# Patient Record
Sex: FEMALE | Race: WHITE | Employment: OTHER | URBAN - METROPOLITAN AREA
[De-identification: names, ages, dates, MRNs, and addresses within clinical notes are randomized per-mention and may not be internally consistent; named-entity substitution may affect disease eponyms.]

---

## 2017-01-10 RX ORDER — HYDROCHLOROTHIAZIDE 25 MG/1
TABLET ORAL
Qty: 90 TABLET | Refills: 3 | Status: SHIPPED | OUTPATIENT
Start: 2017-01-10 | End: 2017-04-25 | Stop reason: SDUPTHER

## 2017-04-11 ENCOUNTER — OFFICE VISIT (OUTPATIENT)
Dept: CARDIOLOGY CLINIC | Age: 82
End: 2017-04-11

## 2017-04-11 VITALS
OXYGEN SATURATION: 98 % | HEIGHT: 57 IN | BODY MASS INDEX: 32.36 KG/M2 | DIASTOLIC BLOOD PRESSURE: 60 MMHG | WEIGHT: 150 LBS | HEART RATE: 70 BPM | SYSTOLIC BLOOD PRESSURE: 104 MMHG

## 2017-04-11 VITALS
HEIGHT: 57 IN | HEART RATE: 72 BPM | BODY MASS INDEX: 32.36 KG/M2 | SYSTOLIC BLOOD PRESSURE: 124 MMHG | DIASTOLIC BLOOD PRESSURE: 62 MMHG | OXYGEN SATURATION: 96 % | WEIGHT: 150 LBS

## 2017-04-11 DIAGNOSIS — I10 ESSENTIAL HYPERTENSION: ICD-10-CM

## 2017-04-11 DIAGNOSIS — I48.19 PERSISTENT ATRIAL FIBRILLATION (HCC): Primary | ICD-10-CM

## 2017-04-11 DIAGNOSIS — R29.6 RECURRENT FALLS: ICD-10-CM

## 2017-04-11 DIAGNOSIS — R29.6 FALLS FREQUENTLY: ICD-10-CM

## 2017-04-11 DIAGNOSIS — E78.2 MIXED HYPERLIPIDEMIA: ICD-10-CM

## 2017-04-11 PROCEDURE — G8417 CALC BMI ABV UP PARAM F/U: HCPCS | Performed by: INTERNAL MEDICINE

## 2017-04-11 PROCEDURE — 1090F PRES/ABSN URINE INCON ASSESS: CPT | Performed by: INTERNAL MEDICINE

## 2017-04-11 PROCEDURE — G8427 DOCREV CUR MEDS BY ELIG CLIN: HCPCS | Performed by: INTERNAL MEDICINE

## 2017-04-11 PROCEDURE — 1036F TOBACCO NON-USER: CPT | Performed by: INTERNAL MEDICINE

## 2017-04-11 PROCEDURE — 1123F ACP DISCUSS/DSCN MKR DOCD: CPT | Performed by: INTERNAL MEDICINE

## 2017-04-11 PROCEDURE — 4040F PNEUMOC VAC/ADMIN/RCVD: CPT | Performed by: INTERNAL MEDICINE

## 2017-04-11 PROCEDURE — 93000 ELECTROCARDIOGRAM COMPLETE: CPT | Performed by: INTERNAL MEDICINE

## 2017-04-11 PROCEDURE — 99214 OFFICE O/P EST MOD 30 MIN: CPT | Performed by: INTERNAL MEDICINE

## 2017-04-11 PROCEDURE — 99999 PR OFFICE/OUTPT VISIT,PROCEDURE ONLY: CPT | Performed by: INTERNAL MEDICINE

## 2017-04-13 ENCOUNTER — TELEPHONE (OUTPATIENT)
Dept: CARDIOLOGY CLINIC | Age: 82
End: 2017-04-13

## 2017-04-17 ENCOUNTER — HOSPITAL ENCOUNTER (OUTPATIENT)
Dept: CARDIAC CATH/INVASIVE PROCEDURES | Age: 82
Discharge: HOME | End: 2017-04-17
Attending: INTERNAL MEDICINE | Admitting: INTERNAL MEDICINE

## 2017-04-17 VITALS — BODY MASS INDEX: 33.97 KG/M2 | WEIGHT: 151 LBS | HEIGHT: 56 IN

## 2017-04-17 LAB
LV EF: 55 %
LVEF MODALITY: NORMAL

## 2017-04-17 RX ORDER — SODIUM CHLORIDE 0.9 % (FLUSH) 0.9 %
10 SYRINGE (ML) INJECTION EVERY 12 HOURS SCHEDULED
Status: DISCONTINUED | OUTPATIENT
Start: 2017-04-17 | End: 2017-04-18 | Stop reason: HOSPADM

## 2017-04-17 RX ORDER — LORAZEPAM 1 MG/1
0.5 TABLET ORAL ONCE
Status: DISCONTINUED | OUTPATIENT
Start: 2017-04-17 | End: 2017-04-18 | Stop reason: HOSPADM

## 2017-04-17 RX ORDER — SODIUM CHLORIDE 0.9 % (FLUSH) 0.9 %
10 SYRINGE (ML) INJECTION PRN
Status: DISCONTINUED | OUTPATIENT
Start: 2017-04-17 | End: 2017-04-18 | Stop reason: HOSPADM

## 2017-04-17 RX ORDER — SODIUM CHLORIDE 0.9 % (FLUSH) 0.9 %
10 SYRINGE (ML) INJECTION PRN
Status: CANCELLED | OUTPATIENT
Start: 2017-04-17

## 2017-04-17 RX ORDER — SODIUM CHLORIDE 9 MG/ML
INJECTION, SOLUTION INTRAVENOUS CONTINUOUS
Status: DISCONTINUED | OUTPATIENT
Start: 2017-04-17 | End: 2017-04-18 | Stop reason: HOSPADM

## 2017-04-17 RX ORDER — ONDANSETRON 2 MG/ML
4 INJECTION INTRAMUSCULAR; INTRAVENOUS EVERY 6 HOURS PRN
Status: CANCELLED | OUTPATIENT
Start: 2017-04-17

## 2017-04-17 RX ORDER — ACETAMINOPHEN 325 MG/1
650 TABLET ORAL EVERY 4 HOURS PRN
Status: CANCELLED | OUTPATIENT
Start: 2017-04-17

## 2017-04-17 RX ORDER — SODIUM CHLORIDE 0.9 % (FLUSH) 0.9 %
10 SYRINGE (ML) INJECTION EVERY 12 HOURS SCHEDULED
Status: CANCELLED | OUTPATIENT
Start: 2017-04-17

## 2017-04-24 DIAGNOSIS — I48.19 PERSISTENT ATRIAL FIBRILLATION (HCC): ICD-10-CM

## 2017-04-24 LAB
ANION GAP SERPL CALCULATED.3IONS-SCNC: 15 MMOL/L (ref 3–16)
BUN BLDV-MCNC: 43 MG/DL (ref 7–20)
CALCIUM SERPL-MCNC: 9.7 MG/DL (ref 8.3–10.6)
CHLORIDE BLD-SCNC: 108 MMOL/L (ref 99–110)
CO2: 24 MMOL/L (ref 21–32)
CREAT SERPL-MCNC: 1.3 MG/DL (ref 0.6–1.2)
GFR AFRICAN AMERICAN: 47
GFR NON-AFRICAN AMERICAN: 39
GLUCOSE BLD-MCNC: 96 MG/DL (ref 70–99)
POTASSIUM SERPL-SCNC: 5.4 MMOL/L (ref 3.5–5.1)
SODIUM BLD-SCNC: 147 MMOL/L (ref 136–145)

## 2017-04-25 ENCOUNTER — OFFICE VISIT (OUTPATIENT)
Dept: CARDIOLOGY CLINIC | Age: 82
End: 2017-04-25

## 2017-04-25 VITALS
OXYGEN SATURATION: 96 % | HEIGHT: 56 IN | DIASTOLIC BLOOD PRESSURE: 64 MMHG | BODY MASS INDEX: 33.74 KG/M2 | HEART RATE: 71 BPM | WEIGHT: 150 LBS | SYSTOLIC BLOOD PRESSURE: 118 MMHG

## 2017-04-25 DIAGNOSIS — E78.2 MIXED HYPERLIPIDEMIA: ICD-10-CM

## 2017-04-25 DIAGNOSIS — I10 ESSENTIAL HYPERTENSION: ICD-10-CM

## 2017-04-25 DIAGNOSIS — I48.19 PERSISTENT ATRIAL FIBRILLATION (HCC): Primary | ICD-10-CM

## 2017-04-25 PROCEDURE — 1036F TOBACCO NON-USER: CPT | Performed by: INTERNAL MEDICINE

## 2017-04-25 PROCEDURE — G8417 CALC BMI ABV UP PARAM F/U: HCPCS | Performed by: INTERNAL MEDICINE

## 2017-04-25 PROCEDURE — 1090F PRES/ABSN URINE INCON ASSESS: CPT | Performed by: INTERNAL MEDICINE

## 2017-04-25 PROCEDURE — 93000 ELECTROCARDIOGRAM COMPLETE: CPT | Performed by: INTERNAL MEDICINE

## 2017-04-25 PROCEDURE — 4040F PNEUMOC VAC/ADMIN/RCVD: CPT | Performed by: INTERNAL MEDICINE

## 2017-04-25 PROCEDURE — 99215 OFFICE O/P EST HI 40 MIN: CPT | Performed by: INTERNAL MEDICINE

## 2017-04-25 PROCEDURE — 1123F ACP DISCUSS/DSCN MKR DOCD: CPT | Performed by: INTERNAL MEDICINE

## 2017-04-25 PROCEDURE — G8427 DOCREV CUR MEDS BY ELIG CLIN: HCPCS | Performed by: INTERNAL MEDICINE

## 2017-07-13 ENCOUNTER — OFFICE VISIT (OUTPATIENT)
Dept: CARDIOLOGY CLINIC | Age: 82
End: 2017-07-13

## 2017-07-13 VITALS
HEART RATE: 78 BPM | WEIGHT: 148 LBS | HEIGHT: 56 IN | DIASTOLIC BLOOD PRESSURE: 84 MMHG | OXYGEN SATURATION: 99 % | BODY MASS INDEX: 33.29 KG/M2 | SYSTOLIC BLOOD PRESSURE: 126 MMHG

## 2017-07-13 DIAGNOSIS — E78.2 MIXED HYPERLIPIDEMIA: ICD-10-CM

## 2017-07-13 DIAGNOSIS — I48.19 PERSISTENT ATRIAL FIBRILLATION (HCC): Primary | ICD-10-CM

## 2017-07-13 DIAGNOSIS — I10 ESSENTIAL HYPERTENSION: ICD-10-CM

## 2017-07-13 PROCEDURE — 99214 OFFICE O/P EST MOD 30 MIN: CPT | Performed by: INTERNAL MEDICINE

## 2017-07-13 PROCEDURE — 4040F PNEUMOC VAC/ADMIN/RCVD: CPT | Performed by: INTERNAL MEDICINE

## 2017-07-13 PROCEDURE — G8427 DOCREV CUR MEDS BY ELIG CLIN: HCPCS | Performed by: INTERNAL MEDICINE

## 2017-07-13 PROCEDURE — 1090F PRES/ABSN URINE INCON ASSESS: CPT | Performed by: INTERNAL MEDICINE

## 2017-07-13 PROCEDURE — 93000 ELECTROCARDIOGRAM COMPLETE: CPT | Performed by: INTERNAL MEDICINE

## 2017-07-13 PROCEDURE — 1123F ACP DISCUSS/DSCN MKR DOCD: CPT | Performed by: INTERNAL MEDICINE

## 2017-07-13 PROCEDURE — G8417 CALC BMI ABV UP PARAM F/U: HCPCS | Performed by: INTERNAL MEDICINE

## 2017-07-13 PROCEDURE — 1036F TOBACCO NON-USER: CPT | Performed by: INTERNAL MEDICINE

## 2017-07-19 LAB — ABO/RH: NORMAL

## 2017-07-25 ENCOUNTER — OFFICE VISIT (OUTPATIENT)
Dept: CARDIOLOGY CLINIC | Age: 82
End: 2017-07-25

## 2017-07-25 ENCOUNTER — TELEPHONE (OUTPATIENT)
Dept: CARDIOLOGY CLINIC | Age: 82
End: 2017-07-25

## 2017-07-25 VITALS
DIASTOLIC BLOOD PRESSURE: 60 MMHG | HEIGHT: 57 IN | WEIGHT: 149 LBS | BODY MASS INDEX: 32.15 KG/M2 | HEART RATE: 76 BPM | OXYGEN SATURATION: 95 % | SYSTOLIC BLOOD PRESSURE: 108 MMHG

## 2017-07-25 DIAGNOSIS — E78.2 MIXED HYPERLIPIDEMIA: ICD-10-CM

## 2017-07-25 DIAGNOSIS — I48.91 ATRIAL FIBRILLATION, UNSPECIFIED TYPE (HCC): Primary | ICD-10-CM

## 2017-07-25 DIAGNOSIS — I10 ESSENTIAL HYPERTENSION: ICD-10-CM

## 2017-07-25 LAB
CHOLESTEROL, TOTAL: 125 MG/DL (ref 0–199)
HDLC SERPL-MCNC: 68 MG/DL (ref 40–60)
LDL CHOLESTEROL CALCULATED: 40 MG/DL
TRIGL SERPL-MCNC: 83 MG/DL (ref 0–150)
VLDLC SERPL CALC-MCNC: 17 MG/DL

## 2017-07-25 PROCEDURE — 99214 OFFICE O/P EST MOD 30 MIN: CPT | Performed by: INTERNAL MEDICINE

## 2017-07-25 PROCEDURE — 1036F TOBACCO NON-USER: CPT | Performed by: INTERNAL MEDICINE

## 2017-07-25 PROCEDURE — 1123F ACP DISCUSS/DSCN MKR DOCD: CPT | Performed by: INTERNAL MEDICINE

## 2017-07-25 PROCEDURE — 1090F PRES/ABSN URINE INCON ASSESS: CPT | Performed by: INTERNAL MEDICINE

## 2017-07-25 PROCEDURE — G8417 CALC BMI ABV UP PARAM F/U: HCPCS | Performed by: INTERNAL MEDICINE

## 2017-07-25 PROCEDURE — G8427 DOCREV CUR MEDS BY ELIG CLIN: HCPCS | Performed by: INTERNAL MEDICINE

## 2017-07-25 PROCEDURE — 1111F DSCHRG MED/CURRENT MED MERGE: CPT | Performed by: INTERNAL MEDICINE

## 2017-07-25 PROCEDURE — 4040F PNEUMOC VAC/ADMIN/RCVD: CPT | Performed by: INTERNAL MEDICINE

## 2017-07-25 PROCEDURE — 93000 ELECTROCARDIOGRAM COMPLETE: CPT | Performed by: INTERNAL MEDICINE

## 2017-08-07 DIAGNOSIS — I48.19 PERSISTENT ATRIAL FIBRILLATION (HCC): Primary | ICD-10-CM

## 2017-08-11 ENCOUNTER — TELEPHONE (OUTPATIENT)
Dept: CARDIOLOGY CLINIC | Age: 82
End: 2017-08-11

## 2017-08-17 ENCOUNTER — OFFICE VISIT (OUTPATIENT)
Dept: CARDIOLOGY CLINIC | Age: 82
End: 2017-08-17

## 2017-08-17 VITALS
WEIGHT: 146 LBS | OXYGEN SATURATION: 98 % | DIASTOLIC BLOOD PRESSURE: 84 MMHG | HEIGHT: 57 IN | SYSTOLIC BLOOD PRESSURE: 138 MMHG | HEART RATE: 67 BPM | BODY MASS INDEX: 31.5 KG/M2

## 2017-08-17 DIAGNOSIS — I48.91 ATRIAL FIBRILLATION, UNSPECIFIED TYPE (HCC): Primary | ICD-10-CM

## 2017-08-17 DIAGNOSIS — I10 ESSENTIAL HYPERTENSION: ICD-10-CM

## 2017-08-17 PROCEDURE — 1036F TOBACCO NON-USER: CPT | Performed by: INTERNAL MEDICINE

## 2017-08-17 PROCEDURE — 1090F PRES/ABSN URINE INCON ASSESS: CPT | Performed by: INTERNAL MEDICINE

## 2017-08-17 PROCEDURE — G8427 DOCREV CUR MEDS BY ELIG CLIN: HCPCS | Performed by: INTERNAL MEDICINE

## 2017-08-17 PROCEDURE — 99215 OFFICE O/P EST HI 40 MIN: CPT | Performed by: INTERNAL MEDICINE

## 2017-08-17 PROCEDURE — 1123F ACP DISCUSS/DSCN MKR DOCD: CPT | Performed by: INTERNAL MEDICINE

## 2017-08-17 PROCEDURE — 93000 ELECTROCARDIOGRAM COMPLETE: CPT | Performed by: INTERNAL MEDICINE

## 2017-08-17 PROCEDURE — G8417 CALC BMI ABV UP PARAM F/U: HCPCS | Performed by: INTERNAL MEDICINE

## 2017-08-17 PROCEDURE — 1111F DSCHRG MED/CURRENT MED MERGE: CPT | Performed by: INTERNAL MEDICINE

## 2017-08-17 PROCEDURE — 4040F PNEUMOC VAC/ADMIN/RCVD: CPT | Performed by: INTERNAL MEDICINE

## 2017-09-01 RX ORDER — SODIUM CHLORIDE 0.9 % (FLUSH) 0.9 %
10 SYRINGE (ML) INJECTION EVERY 12 HOURS SCHEDULED
Status: CANCELLED | OUTPATIENT
Start: 2017-09-05

## 2017-09-01 RX ORDER — SODIUM CHLORIDE 0.9 % (FLUSH) 0.9 %
10 SYRINGE (ML) INJECTION PRN
Status: CANCELLED | OUTPATIENT
Start: 2017-09-05

## 2017-09-01 RX ORDER — SODIUM CHLORIDE 9 MG/ML
INJECTION, SOLUTION INTRAVENOUS CONTINUOUS
Status: CANCELLED | OUTPATIENT
Start: 2017-09-05

## 2017-09-05 ENCOUNTER — HOSPITAL ENCOUNTER (OUTPATIENT)
Dept: CARDIAC CATH/INVASIVE PROCEDURES | Age: 82
Discharge: HOME | End: 2017-09-05
Admitting: INTERNAL MEDICINE

## 2017-09-06 ENCOUNTER — TELEPHONE (OUTPATIENT)
Dept: CARDIOLOGY CLINIC | Age: 82
End: 2017-09-06

## 2017-09-08 ENCOUNTER — HOSPITAL ENCOUNTER (OUTPATIENT)
Dept: CARDIAC CATH/INVASIVE PROCEDURES | Age: 82
Discharge: HOME | End: 2017-09-08
Admitting: INTERNAL MEDICINE

## 2017-09-08 LAB
LV EF: 55 %
LVEF MODALITY: NORMAL

## 2017-09-08 RX ORDER — SODIUM CHLORIDE 0.9 % (FLUSH) 0.9 %
10 SYRINGE (ML) INJECTION PRN
Status: DISCONTINUED | OUTPATIENT
Start: 2017-09-08 | End: 2017-09-09 | Stop reason: HOSPADM

## 2017-09-08 RX ORDER — SODIUM CHLORIDE 0.9 % (FLUSH) 0.9 %
10 SYRINGE (ML) INJECTION EVERY 12 HOURS SCHEDULED
Status: DISCONTINUED | OUTPATIENT
Start: 2017-09-08 | End: 2017-09-09 | Stop reason: HOSPADM

## 2017-09-08 RX ORDER — SODIUM CHLORIDE 9 MG/ML
INJECTION, SOLUTION INTRAVENOUS CONTINUOUS
Status: DISCONTINUED | OUTPATIENT
Start: 2017-09-08 | End: 2017-09-09 | Stop reason: HOSPADM

## 2017-09-20 ENCOUNTER — TELEPHONE (OUTPATIENT)
Dept: CARDIOLOGY CLINIC | Age: 82
End: 2017-09-20

## 2017-10-09 ENCOUNTER — TELEPHONE (OUTPATIENT)
Dept: CARDIOLOGY CLINIC | Age: 82
End: 2017-10-09

## 2017-10-09 NOTE — TELEPHONE ENCOUNTER
Lacho Okeefe called in stating that she has become more SOB recently since shes had the watchman.     Lacho Okeefe can be reached at [phone]

## 2017-10-10 NOTE — TELEPHONE ENCOUNTER
Spoke with Mai Rizo to see if she is avail tomorrow to come in to see Ronal Levy CNP to make sure that she is not in heart failure. She dos note that her allergies are really bad right now and she does take an allergy pill daily.  She is able to come to the appt tomorrow's opening at 2pm.

## 2017-10-11 ENCOUNTER — OFFICE VISIT (OUTPATIENT)
Dept: CARDIOLOGY CLINIC | Age: 82
End: 2017-10-11

## 2017-10-11 VITALS
HEART RATE: 60 BPM | OXYGEN SATURATION: 98 % | WEIGHT: 142 LBS | DIASTOLIC BLOOD PRESSURE: 70 MMHG | HEIGHT: 57 IN | BODY MASS INDEX: 30.63 KG/M2 | SYSTOLIC BLOOD PRESSURE: 134 MMHG

## 2017-10-11 DIAGNOSIS — I48.19 PERSISTENT ATRIAL FIBRILLATION (HCC): ICD-10-CM

## 2017-10-11 DIAGNOSIS — R06.02 SOB (SHORTNESS OF BREATH): Primary | ICD-10-CM

## 2017-10-11 DIAGNOSIS — E78.5 HYPERLIPIDEMIA, UNSPECIFIED HYPERLIPIDEMIA TYPE: ICD-10-CM

## 2017-10-11 DIAGNOSIS — I10 ESSENTIAL HYPERTENSION: ICD-10-CM

## 2017-10-11 DIAGNOSIS — D64.9 ANEMIA, UNSPECIFIED TYPE: ICD-10-CM

## 2017-10-11 PROCEDURE — 1090F PRES/ABSN URINE INCON ASSESS: CPT | Performed by: NURSE PRACTITIONER

## 2017-10-11 PROCEDURE — 1123F ACP DISCUSS/DSCN MKR DOCD: CPT | Performed by: NURSE PRACTITIONER

## 2017-10-11 PROCEDURE — 1036F TOBACCO NON-USER: CPT | Performed by: NURSE PRACTITIONER

## 2017-10-11 PROCEDURE — G8427 DOCREV CUR MEDS BY ELIG CLIN: HCPCS | Performed by: NURSE PRACTITIONER

## 2017-10-11 PROCEDURE — 4040F PNEUMOC VAC/ADMIN/RCVD: CPT | Performed by: NURSE PRACTITIONER

## 2017-10-11 PROCEDURE — G8417 CALC BMI ABV UP PARAM F/U: HCPCS | Performed by: NURSE PRACTITIONER

## 2017-10-11 PROCEDURE — G8484 FLU IMMUNIZE NO ADMIN: HCPCS | Performed by: NURSE PRACTITIONER

## 2017-10-11 PROCEDURE — 99214 OFFICE O/P EST MOD 30 MIN: CPT | Performed by: NURSE PRACTITIONER

## 2017-10-11 RX ORDER — CLOPIDOGREL BISULFATE 75 MG/1
75 TABLET ORAL DAILY
Qty: 30 TABLET | Refills: 5 | Status: SHIPPED | OUTPATIENT
Start: 2017-10-11 | End: 2017-10-11 | Stop reason: SDUPTHER

## 2017-10-11 NOTE — PROGRESS NOTES
142 lb (64.4 kg)   SpO2 98%   BMI 30.73 kg/m²   Wt Readings from Last 2 Encounters:   10/11/17 142 lb (64.4 kg)   08/17/17 146 lb (66.2 kg)     Constitutional: She is oriented to person, place, and time. She is elderly and frail in appeaarance. In no acute distress. HEENT: Normocephalic and atraumatic. Sclerae anicteric. No xanthelasmas. EOM's intact. Neck: Neck supple. No JVD present. Carotids without bruits. No mass and no thyromegaly present. No lymphadenopathy present. Cardiovascular: irreg;  normal S1 and S2; no murmur/gallop or rub, PMI nondisplaced  Pulmonary/Chest: Effort normal and breath sounds normal.  Lungs clear to auscultation. Chest wall nontender  Abdominal: soft, nontender, nondistended. + bowel sounds; no organomegaly or bruits. Aorta normal  Extremities: No edema, cyanosis, or clubbing. Pulses are 2+ radial/carotid/dorsalis pedis bilaterally. Cap refill brisk. Neurological: No cranial nerve deficit. Skin: Skin is warm and dry. Psychiatric: She has a normal mood and affect. Her speech is normal and behavior is normal.     Lab Review:   Lab Results   Component Value Date    TRIG 83 07/25/2017    HDL 68 07/25/2017    LDLCALC 40 07/25/2017    LABVLDL 17 07/25/2017     Lab Results   Component Value Date     07/20/2017    K 4.7 07/20/2017     07/20/2017    CO2 23 07/20/2017    BUN 30 07/20/2017    CREATININE 1.1 07/20/2017    GLUCOSE 102 07/20/2017    CALCIUM 9.1 07/20/2017      Lab Results   Component Value Date    WBC 8.6 07/20/2017    HGB 9.8 (L) 07/20/2017    HCT 30.4 (L) 07/20/2017    MCV 98.9 07/20/2017     07/20/2017     MARTHA 9/8/2017:  LA appendage occlusion device WATCHMAN in place and position appears good  and its well seated  Color flow does not reveal any residual leak. Normal LV function. LAAO procedure 7/19-7/20 at [facility] 30mm Watchman Device Implanted    MARTHA 4/17/2017:  The patient tolerated the procedure well.  There were no complications.   The MARTHA assessment of MARGE did not reveal any thrombus the measurements are recorded in the file   The largest MARGE Ostial diameter recorded was 2.9 cm with a depth of 2.75 cm   Normal left ventricle size, wall thickness and systolic function with an  estimated ejection fraction of 55%. No regional wall motion abnormalities  are seen.  Normal right ventricular size and function. 3/2007 Adenosine Myoview: ([facility])  Negative for ischemia     Assessment:    1. SOB (shortness of breath)  -with exertion and associated with increased back pain  -suspect precipitated by back pain  -no sxs of CHF on exam  -euvolemic    2. Persistent atrial fibrillation (HCC)  -S/P prior MARTHA and DCCV with return to A-fib  -S/P LAAO, Watchman procedure in 7/2017  -on Xarelto and ASA: as per Dr. Jayro Jackson prior note, plan was to change Xarelto to Plavix after MARTHA along with continuing ASA  -CHADSVasc score is at least 4  -HasBled score 3    3. Essential hypertension  -controlled  -continue ACE-I + HCTZ    4. Hyperlipidemia, unspecified hyperlipidemia type  -on statin  -LDL acceptable on labs from 7/2017    5. Anemia, unspecified type  -follow up CBC to R/O worsening anemia    Plan:  Check CBC to assess status of anemia and Pro-BNP  Stop Xarelto after current Rx completed and then add Plavix 75 mg daily and continue with ASA daily  Continue HCTZ, Lisinopril, fish oil, pravastatin  Discussed low fat/low sodium diet and reinforced regular aerobic exercise. Follow up in 6-8 weeks with Dr. Kole Charles    Return in about 6 weeks (around 11/22/2017) for 6-8 weeks with Dr. Kole Charles. Thanks for allowing me to participate in the care of this patient.       Nelida Pierson, [address], [address], [address] [facility]  Office: [phone]  Fax: [phone]

## 2017-10-11 NOTE — LETTER
5. Anemia, unspecified type  -follow up CBC to R/O worsening anemia    Plan:    Check CBC to assess status of anemia and Pro-BNP  Stop Xarelto after current Rx completed and then add Plavix 75 mg daily and continue with ASA daily  Continue HCTZ, Lisinopril, fish oil, pravastatin  Discussed low fat/low sodium diet and reinforced regular aerobic exercise. Follow up in 6-8 weeks with Dr. Camryn Barnes    Return in about 6 weeks (around 11/22/2017) for 6-8 weeks with Dr. Camryn Barnes. Thanks for allowing me to participate in the care of this patient. If you have questions, please do not hesitate to call me. I look forward to following Marilynn along with you.     Sincerely,        NURIS Contreras, CNP

## 2017-10-12 RX ORDER — CLOPIDOGREL BISULFATE 75 MG/1
75 TABLET ORAL DAILY
Qty: 90 TABLET | Refills: 3 | Status: SHIPPED | OUTPATIENT
Start: 2017-10-12 | End: 2017-10-16 | Stop reason: SDUPTHER

## 2017-10-12 NOTE — COMMUNICATION BODY
HPI:  The patient is 80 y.o. female with a past medical history significant for  Persistent atrial fib and S/P LAAO with Watchman device in 7/2017, HTN, dyslipidemia and chronic back pain here for follow up d/t c/o SOB with exertion. Follows normally with Dr. Favio Buckner and underwent MARTHA on 9/8/2017 which showed Watchman device is in place and residual leak noted with normal LV function. Has noted increase in her allergy issues recently and having increasing SOBOE with inclines and walking prolonged distances associated with back pain and hyperventilation. Her back pain continues to worsen (following pain management). No regular exercise. Denies weight gain, edema, chest pain/discomfort, orthopnea/PND, dizziness, syncope or claudication. Has noted nasal drainage and occasional nonproductive cough. Assessment:    1. SOB (shortness of breath)  -with exertion and associated with increased back pain  -suspect precipitated by back pain  -no sxs of CHF on exam  -euvolemic    2. Persistent atrial fibrillation (HCC)  -S/P prior MARTHA and DCCV with return to A-fib  -S/P LAAO, Watchman procedure in 7/2017  -on Xarelto and ASA: as per Dr. Santiago Ashraf prior note, plan was to change Xarelto to Plavix after MARTHA along with continuing ASA  -CHADSVasc score is at least 4  -HasBled score 3    3. Essential hypertension  -controlled  -continue ACE-I + HCTZ    4. Hyperlipidemia, unspecified hyperlipidemia type  -on statin  -LDL acceptable on labs from 7/2017    5. Anemia, unspecified type  -follow up CBC to R/O worsening anemia    Plan:    Check CBC to assess status of anemia and Pro-BNP  Stop Xarelto after current Rx completed and then add Plavix 75 mg daily and continue with ASA daily  Continue HCTZ, Lisinopril, fish oil, pravastatin  Discussed low fat/low sodium diet and reinforced regular aerobic exercise. Follow up in 6-8 weeks with Dr. Adin Quintero    Return in about 6 weeks (around 11/22/2017) for 6-8 weeks with Dr. Adin Quintero.

## 2017-10-13 DIAGNOSIS — R06.02 SOB (SHORTNESS OF BREATH): ICD-10-CM

## 2017-10-13 DIAGNOSIS — I48.19 PERSISTENT ATRIAL FIBRILLATION (HCC): ICD-10-CM

## 2017-10-13 LAB
ANION GAP SERPL CALCULATED.3IONS-SCNC: 13 MMOL/L (ref 3–16)
BASOPHILS ABSOLUTE: 0 K/UL (ref 0–0.2)
BASOPHILS RELATIVE PERCENT: 0.4 %
BUN BLDV-MCNC: 31 MG/DL (ref 7–20)
CALCIUM SERPL-MCNC: 10 MG/DL (ref 8.3–10.6)
CHLORIDE BLD-SCNC: 103 MMOL/L (ref 99–110)
CO2: 25 MMOL/L (ref 21–32)
CREAT SERPL-MCNC: 1.2 MG/DL (ref 0.6–1.2)
EOSINOPHILS ABSOLUTE: 0.1 K/UL (ref 0–0.6)
EOSINOPHILS RELATIVE PERCENT: 1 %
GFR AFRICAN AMERICAN: 51
GFR NON-AFRICAN AMERICAN: 42
GLUCOSE BLD-MCNC: 96 MG/DL (ref 70–99)
HCT VFR BLD CALC: 32.5 % (ref 36–48)
HEMOGLOBIN: 10.5 G/DL (ref 12–16)
LYMPHOCYTES ABSOLUTE: 1.6 K/UL (ref 1–5.1)
LYMPHOCYTES RELATIVE PERCENT: 22.4 %
MCH RBC QN AUTO: 31.2 PG (ref 26–34)
MCHC RBC AUTO-ENTMCNC: 32.3 G/DL (ref 31–36)
MCV RBC AUTO: 96.6 FL (ref 80–100)
MONOCYTES ABSOLUTE: 0.5 K/UL (ref 0–1.3)
MONOCYTES RELATIVE PERCENT: 7.4 %
NEUTROPHILS ABSOLUTE: 4.9 K/UL (ref 1.7–7.7)
NEUTROPHILS RELATIVE PERCENT: 68.8 %
PDW BLD-RTO: 14 % (ref 12.4–15.4)
PLATELET # BLD: 215 K/UL (ref 135–450)
PMV BLD AUTO: 9.2 FL (ref 5–10.5)
POTASSIUM SERPL-SCNC: 5.5 MMOL/L (ref 3.5–5.1)
PRO-BNP: 2307 PG/ML (ref 0–449)
RBC # BLD: 3.37 M/UL (ref 4–5.2)
SODIUM BLD-SCNC: 141 MMOL/L (ref 136–145)
WBC # BLD: 7.2 K/UL (ref 4–11)

## 2017-10-16 ENCOUNTER — TELEPHONE (OUTPATIENT)
Dept: CARDIOLOGY CLINIC | Age: 82
End: 2017-10-16

## 2017-10-16 DIAGNOSIS — I48.19 PERSISTENT ATRIAL FIBRILLATION (HCC): ICD-10-CM

## 2017-10-16 DIAGNOSIS — E87.5 HYPERKALEMIA: Primary | ICD-10-CM

## 2017-10-16 RX ORDER — ASPIRIN 81 MG/1
81 TABLET, CHEWABLE ORAL DAILY
Qty: 30 TABLET | Refills: 6 | Status: SHIPPED | OUTPATIENT
Start: 2017-10-16 | End: 2018-05-22 | Stop reason: SDUPTHER

## 2017-10-16 RX ORDER — CLOPIDOGREL BISULFATE 75 MG/1
75 TABLET ORAL DAILY
Qty: 90 TABLET | Refills: 3 | Status: SHIPPED | OUTPATIENT
Start: 2017-10-16 | End: 2018-05-29 | Stop reason: ALTCHOICE

## 2017-10-31 ENCOUNTER — TELEPHONE (OUTPATIENT)
Dept: CARDIOLOGY CLINIC | Age: 82
End: 2017-10-31

## 2017-10-31 DIAGNOSIS — E87.5 HYPERKALEMIA: ICD-10-CM

## 2017-10-31 LAB
ANION GAP SERPL CALCULATED.3IONS-SCNC: 15 MMOL/L (ref 3–16)
BUN BLDV-MCNC: 35 MG/DL (ref 7–20)
CALCIUM SERPL-MCNC: 10.2 MG/DL (ref 8.3–10.6)
CHLORIDE BLD-SCNC: 107 MMOL/L (ref 99–110)
CO2: 22 MMOL/L (ref 21–32)
CREAT SERPL-MCNC: 1.1 MG/DL (ref 0.6–1.2)
GFR AFRICAN AMERICAN: 57
GFR NON-AFRICAN AMERICAN: 47
GLUCOSE BLD-MCNC: 84 MG/DL (ref 70–99)
POTASSIUM SERPL-SCNC: 5.2 MMOL/L (ref 3.5–5.1)
SODIUM BLD-SCNC: 144 MMOL/L (ref 136–145)

## 2017-11-02 ENCOUNTER — TELEPHONE (OUTPATIENT)
Dept: CARDIOLOGY CLINIC | Age: 82
End: 2017-11-02

## 2017-11-02 DIAGNOSIS — E87.5 HIGH POTASSIUM: Primary | ICD-10-CM

## 2017-11-02 RX ORDER — TRAZODONE HYDROCHLORIDE 50 MG/1
50 TABLET ORAL NIGHTLY
Qty: 30 TABLET | Refills: 0
Start: 2017-11-02

## 2017-11-02 NOTE — TELEPHONE ENCOUNTER
Holly Patel called in stating that she takes Trazodone 50 mg once at night to help her sleep and she doesn't think that is on her medication list.

## 2017-11-27 ENCOUNTER — OFFICE VISIT (OUTPATIENT)
Dept: CARDIOLOGY CLINIC | Age: 82
End: 2017-11-27

## 2017-11-27 VITALS
SYSTOLIC BLOOD PRESSURE: 134 MMHG | WEIGHT: 143.38 LBS | DIASTOLIC BLOOD PRESSURE: 78 MMHG | BODY MASS INDEX: 30.93 KG/M2 | HEIGHT: 57 IN | HEART RATE: 70 BPM | OXYGEN SATURATION: 97 %

## 2017-11-27 DIAGNOSIS — E87.5 HYPERKALEMIA: ICD-10-CM

## 2017-11-27 DIAGNOSIS — I48.91 ATRIAL FIBRILLATION, UNSPECIFIED TYPE (HCC): Primary | ICD-10-CM

## 2017-11-27 DIAGNOSIS — E78.5 HYPERLIPIDEMIA, UNSPECIFIED HYPERLIPIDEMIA TYPE: ICD-10-CM

## 2017-11-27 DIAGNOSIS — I10 ESSENTIAL HYPERTENSION: ICD-10-CM

## 2017-11-27 DIAGNOSIS — D64.9 ANEMIA, UNSPECIFIED TYPE: ICD-10-CM

## 2017-11-27 PROCEDURE — G8417 CALC BMI ABV UP PARAM F/U: HCPCS | Performed by: INTERNAL MEDICINE

## 2017-11-27 PROCEDURE — G8484 FLU IMMUNIZE NO ADMIN: HCPCS | Performed by: INTERNAL MEDICINE

## 2017-11-27 PROCEDURE — 4040F PNEUMOC VAC/ADMIN/RCVD: CPT | Performed by: INTERNAL MEDICINE

## 2017-11-27 PROCEDURE — 93000 ELECTROCARDIOGRAM COMPLETE: CPT | Performed by: INTERNAL MEDICINE

## 2017-11-27 PROCEDURE — G8427 DOCREV CUR MEDS BY ELIG CLIN: HCPCS | Performed by: INTERNAL MEDICINE

## 2017-11-27 PROCEDURE — 1123F ACP DISCUSS/DSCN MKR DOCD: CPT | Performed by: INTERNAL MEDICINE

## 2017-11-27 PROCEDURE — 1090F PRES/ABSN URINE INCON ASSESS: CPT | Performed by: INTERNAL MEDICINE

## 2017-11-27 PROCEDURE — 99214 OFFICE O/P EST MOD 30 MIN: CPT | Performed by: INTERNAL MEDICINE

## 2017-11-27 PROCEDURE — 1036F TOBACCO NON-USER: CPT | Performed by: INTERNAL MEDICINE

## 2017-11-27 RX ORDER — TIZANIDINE 2 MG/1
TABLET ORAL
Refills: 0 | Status: ON HOLD | COMMUNITY
Start: 2017-11-16 | End: 2019-02-26 | Stop reason: SDUPTHER

## 2017-11-27 NOTE — PROGRESS NOTES
[address]  Cardiology Office Visit    Kenzie Brown  5/6/1930 November 27, 2017    CC: CHF, AFIB    The patient is 80 y.o. female with a past medical history significant for  Persistent atrial fib and S/P LAAO with Watchman device in 7/2017, HTN, dyslipidemia and chronic back pain here for follow up d/t c/o SOB with exertion. Follows normally with Dr. Lane Perea and underwent MARTHA on 9/8/2017 which showed Watchman device is in place and residual leak noted with normal LV function. Since we last saw Holly Patel she reports that she has been feeling well. There has been no chest pain. There has been no awareness of her heart racing or skipping beats. She says \"I can't tell\". No regular exercise. Denies weight gain, edema, orthopnea/PND, dizziness, syncope or claudication. Has noted nasal drainage and occasional nonproductive cough. Review of Systems:  Constitutional: Denies  fatigue, weakness, night sweats or fever. HEENT: Denies new visual changes, ringing in ears, nosebleeds. Respiratory: + SOBOE with associated back pain  Cardiovascular: see HPI  GI: Denies N/V, diarrhea, constipation, abdominal pain, change in bowel habits, melena or hematochezia  : Denies urinary frequency, urgency, incontinence, hematuria or dysuria. Skin: Denies rash, hives, or cyanosis  Musculoskeletal: + chronic back pain  Neurological: Denies syncope or TIA-like symptoms.   Psychiatric: Denies anxiety, insomnia or depression     Social History   Substance Use Topics    Smoking status: Never Smoker    Smokeless tobacco: Never Used    Alcohol use No       Allergies   Allergen Reactions    Elena Advanced Aspirin Ex St [Aspirin] Other (See Comments)     Elena back and body  Off balance and deaf     Current Outpatient Prescriptions   Medication Sig Dispense Refill    tiZANidine (ZANAFLEX) 2 MG tablet TK 1 T PO BID PRN  0    traZODone (DESYREL) 50 MG tablet Take 1 tablet by mouth nightly 30 tablet 0    aspirin 81 MG chewable tablet Take 1 tablet by mouth daily 30 tablet 6    clopidogrel (PLAVIX) 75 MG tablet Take 1 tablet by mouth daily 90 tablet 3    calcium carbonate 600 MG TABS tablet Take 1 tablet by mouth daily      HYDROcodone-acetaminophen (NORCO)  MG per tablet Take 1 tablet by mouth every 6 hours as needed for Pain      citalopram (CELEXA) 10 MG tablet Take 20 mg by mouth nightly Indications: pt unaware of dosage       hydrochlorothiazide (HYDRODIURIL) 25 MG tablet Take 1 tablet by mouth daily. 30 tablet 5    CINNAMON PO Take 1 capsule by mouth daily       GARLIC PO Take 1 capsule by mouth daily       loratadine (CLARITIN) 10 MG tablet Take 10 mg by mouth daily.  Omega-3 Fatty Acids (FISH OIL) 1000 MG CAPS Take 1,000 mg by mouth daily.  Coenzyme Q10 100 MG CAPS Take 100 mg by mouth daily.  pravastatin (PRAVACHOL) 40 MG tablet Take 40 mg by mouth every evening.  omeprazole (PRILOSEC) 40 MG capsule Take 40 mg by mouth daily. Take 40 mg by mouth daily.  lisinopril (PRINIVIL;ZESTRIL) 40 MG tablet Take 40 mg by mouth daily. No current facility-administered medications for this visit. Physical Exam:   /78 (Site: Left Arm, Position: Sitting, Cuff Size: Large Adult)   Pulse 70   Ht 4' 9\" (1.448 m)   Wt 143 lb 6 oz (65 kg)   SpO2 97%   BMI 31.03 kg/m²   Wt Readings from Last 2 Encounters:   11/27/17 143 lb 6 oz (65 kg)   10/11/17 142 lb (64.4 kg)     Constitutional: She is oriented to person, place, and time. She is elderly and frail in appeaarance. In no acute distress. HEENT: Normocephalic and atraumatic. Sclerae anicteric. No xanthelasmas. EOM's intact. Neck: Neck supple. No JVD present. Carotids without bruits. No mass and no thyromegaly present. No lymphadenopathy present.   Cardiovascular: irreg;  normal S1 and S2; no murmur/gallop or rub, PMI nondisplaced  Pulmonary/Chest: Effort normal and breath sounds normal.  Lungs clear to auscultation. Chest wall nontender  Abdominal: soft, nontender, nondistended. + bowel sounds; no organomegaly or bruits. Aorta normal  Extremities: No edema, cyanosis, or clubbing. Pulses are 2+ radial/carotid/dorsalis pedis bilaterally. Cap refill brisk. Neurological: No cranial nerve deficit. Skin: Skin is warm and dry. Psychiatric: She has a normal mood and affect. Her speech is normal and behavior is normal.     Lab Review:   Lab Results   Component Value Date    TRIG 83 07/25/2017    HDL 68 07/25/2017    LDLCALC 40 07/25/2017    LABVLDL 17 07/25/2017     Lab Results   Component Value Date     10/31/2017    K 5.2 10/31/2017     10/31/2017    CO2 22 10/31/2017    BUN 35 10/31/2017    CREATININE 1.1 10/31/2017    GLUCOSE 84 10/31/2017    CALCIUM 10.2 10/31/2017      Lab Results   Component Value Date    WBC 7.2 10/13/2017    HGB 10.5 (L) 10/13/2017    HCT 32.5 (L) 10/13/2017    MCV 96.6 10/13/2017     10/13/2017     EKG Interpretation: 11/27/17 controlled AFIB    MARTHA 9/8/2017:  LA appendage occlusion device WATCHMAN in place and position appears good  and its well seated  Color flow does not reveal any residual leak. Normal LV function. LAAO procedure 7/19-7/20 at [facility] 30mm Watchman Device Implanted    MARTHA 4/17/2017:  The patient tolerated the procedure well. There were no complications.   The MARTHA assessment of MARGE did not reveal any thrombus the measurements are recorded in the file   The largest MARGE Ostial diameter recorded was 2.9 cm with a depth of 2.75 cm   Normal left ventricle size, wall thickness and systolic function with an  estimated ejection fraction of 55%. No regional wall motion abnormalities  are seen.  Normal right ventricular size and function. 3/2007 Adenosine Myoview: ([facility])  Negative for ischemia     Plan/Assessment:    1.  Persistent atrial fibrillation (HCC)  -S/P prior MARTHA and DCCV with return to A-fib  -S/P LAAO, Watchman procedure in 7/2017  -on Plavix now instead of anticoagulation  -CHADSVasc score is at least 4  -HasBled score 3  -EKG today controlled atrial fib     2. Essential hypertension  -controlled  -continue ACE-I + HCTZ    3. Hyperlipidemia with goal LDL<130mg/dL, unspecified hyperlipidemia type  -on statin  -LDL acceptable on labs from 7/2017    4. Anemia, unspecified type  -Stable CBC  -repeat now     5. Hyperkalemia   -10/31/17 5.2  -Will repeat BMP now      I think that Ms. Cris Gamboa  is entirely stable from a cardiovascular standpoint. I see no need to make any changes currently in her medical regimen or pursue further testing other than the BMP today. I will have her return in follow up in 6 months.

## 2017-11-27 NOTE — LETTER
[facility]  [facility]            [facility] [place] [address]  Phone: [phone]  Fax: [phone]            November 29, 2017       Patient: Harsha Moise   MR Number: [id number]   YOB: 1930   Date of Visit: 11/27/2017       Dear Dr. Adriana Torres:    Thank you for the request for consultation for Rachid Urban to me. Below are the relevant portions of my assessment and plan of care. The patient is 80 y.o. female with a past medical history significant for  Persistent atrial fib and S/P LAAO with Watchman device in 7/2017, HTN, dyslipidemia and chronic back pain here for follow up d/t c/o SOB with exertion. Follows normally with Dr. Garland Russ and underwent MARTHA on 9/8/2017 which showed Watchman device is in place and residual leak noted with normal LV function. Since we last saw Jose Ascencio she reports that she has been feeling well. There has been no chest pain. There has been no awareness of her heart racing or skipping beats. She says \"I can't tell\". No regular exercise. Denies weight gain, edema, orthopnea/PND, dizziness, syncope or claudication. Has noted nasal drainage and occasional nonproductive cough. Review of Systems:  Constitutional: Denies  fatigue, weakness, night sweats or fever. HEENT: Denies new visual changes, ringing in ears, nosebleeds. Respiratory: + SOBOE with associated back pain  Cardiovascular: see HPI  GI: Denies N/V, diarrhea, constipation, abdominal pain, change in bowel habits, melena or hematochezia  : Denies urinary frequency, urgency, incontinence, hematuria or dysuria. Skin: Denies rash, hives, or cyanosis  Musculoskeletal: + chronic back pain  Neurological: Denies syncope or TIA-like symptoms.   Psychiatric: Denies anxiety, insomnia or depression     Social History   Substance Use Topics    Smoking status: Never Smoker    Smokeless tobacco: Never Used    Alcohol use No

## 2017-11-29 NOTE — COMMUNICATION BODY
[address]  Cardiology Office Visit    Arun Schwartz  5/6/1930 November 27, 2017    CC: CHF, AFIB    The patient is 80 y.o. female with a past medical history significant for  Persistent atrial fib and S/P LAAO with Watchman device in 7/2017, HTN, dyslipidemia and chronic back pain here for follow up d/t c/o SOB with exertion. Follows normally with Dr. Galina Ocasio and underwent MARTHA on 9/8/2017 which showed Watchman device is in place and residual leak noted with normal LV function. Since we last saw Timothy Momin she reports that she has been feeling well. There has been no chest pain. There has been no awareness of her heart racing or skipping beats. She says \"I can't tell\". No regular exercise. Denies weight gain, edema, orthopnea/PND, dizziness, syncope or claudication. Has noted nasal drainage and occasional nonproductive cough. Review of Systems:  Constitutional: Denies  fatigue, weakness, night sweats or fever. HEENT: Denies new visual changes, ringing in ears, nosebleeds. Respiratory: + SOBOE with associated back pain  Cardiovascular: see HPI  GI: Denies N/V, diarrhea, constipation, abdominal pain, change in bowel habits, melena or hematochezia  : Denies urinary frequency, urgency, incontinence, hematuria or dysuria. Skin: Denies rash, hives, or cyanosis  Musculoskeletal: + chronic back pain  Neurological: Denies syncope or TIA-like symptoms.   Psychiatric: Denies anxiety, insomnia or depression     Social History   Substance Use Topics    Smoking status: Never Smoker    Smokeless tobacco: Never Used    Alcohol use No       Allergies   Allergen Reactions    Elena Advanced Aspirin Ex St [Aspirin] Other (See Comments)     Elena back and body  Off balance and deaf     Current Outpatient Prescriptions   Medication Sig Dispense Refill    tiZANidine (ZANAFLEX) 2 MG tablet TK 1 T PO BID PRN  0    traZODone (DESYREL) 50 MG tablet Take 1 tablet by mouth nightly 30 tablet 0    aspirin 81 MG chewable tablet Take 1 tablet by mouth daily 30 tablet 6    clopidogrel (PLAVIX) 75 MG tablet Take 1 tablet by mouth daily 90 tablet 3    calcium carbonate 600 MG TABS tablet Take 1 tablet by mouth daily      HYDROcodone-acetaminophen (NORCO)  MG per tablet Take 1 tablet by mouth every 6 hours as needed for Pain      citalopram (CELEXA) 10 MG tablet Take 20 mg by mouth nightly Indications: pt unaware of dosage       hydrochlorothiazide (HYDRODIURIL) 25 MG tablet Take 1 tablet by mouth daily. 30 tablet 5    CINNAMON PO Take 1 capsule by mouth daily       GARLIC PO Take 1 capsule by mouth daily       loratadine (CLARITIN) 10 MG tablet Take 10 mg by mouth daily.  Omega-3 Fatty Acids (FISH OIL) 1000 MG CAPS Take 1,000 mg by mouth daily.  Coenzyme Q10 100 MG CAPS Take 100 mg by mouth daily.  pravastatin (PRAVACHOL) 40 MG tablet Take 40 mg by mouth every evening.  omeprazole (PRILOSEC) 40 MG capsule Take 40 mg by mouth daily. Take 40 mg by mouth daily.  lisinopril (PRINIVIL;ZESTRIL) 40 MG tablet Take 40 mg by mouth daily. No current facility-administered medications for this visit. Physical Exam:   /78 (Site: Left Arm, Position: Sitting, Cuff Size: Large Adult)   Pulse 70   Ht 4' 9\" (1.448 m)   Wt 143 lb 6 oz (65 kg)   SpO2 97%   BMI 31.03 kg/m²   Wt Readings from Last 2 Encounters:   11/27/17 143 lb 6 oz (65 kg)   10/11/17 142 lb (64.4 kg)     Constitutional: She is oriented to person, place, and time. She is elderly and frail in appeaarance. In no acute distress. HEENT: Normocephalic and atraumatic. Sclerae anicteric. No xanthelasmas. EOM's intact. Neck: Neck supple. No JVD present. Carotids without bruits. No mass and no thyromegaly present. No lymphadenopathy present.   Cardiovascular: irreg;  normal S1 and S2; no murmur/gallop or rub, PMI nondisplaced  Pulmonary/Chest: Effort normal and breath sounds normal.  Lungs clear to auscultation. Chest wall nontender  Abdominal: soft, nontender, nondistended. + bowel sounds; no organomegaly or bruits. Aorta normal  Extremities: No edema, cyanosis, or clubbing. Pulses are 2+ radial/carotid/dorsalis pedis bilaterally. Cap refill brisk. Neurological: No cranial nerve deficit. Skin: Skin is warm and dry. Psychiatric: She has a normal mood and affect. Her speech is normal and behavior is normal.     Lab Review:   Lab Results   Component Value Date    TRIG 83 07/25/2017    HDL 68 07/25/2017    LDLCALC 40 07/25/2017    LABVLDL 17 07/25/2017     Lab Results   Component Value Date     10/31/2017    K 5.2 10/31/2017     10/31/2017    CO2 22 10/31/2017    BUN 35 10/31/2017    CREATININE 1.1 10/31/2017    GLUCOSE 84 10/31/2017    CALCIUM 10.2 10/31/2017      Lab Results   Component Value Date    WBC 7.2 10/13/2017    HGB 10.5 (L) 10/13/2017    HCT 32.5 (L) 10/13/2017    MCV 96.6 10/13/2017     10/13/2017     EKG Interpretation: 11/27/17 controlled AFIB    MARTHA 9/8/2017:  LA appendage occlusion device WATCHMAN in place and position appears good  and its well seated  Color flow does not reveal any residual leak. Normal LV function. LAAO procedure 7/19-7/20 at [facility] 30mm Watchman Device Implanted    MARTHA 4/17/2017:  The patient tolerated the procedure well. There were no complications.   The MARTHA assessment of MARGE did not reveal any thrombus the measurements are recorded in the file   The largest MARGE Ostial diameter recorded was 2.9 cm with a depth of 2.75 cm   Normal left ventricle size, wall thickness and systolic function with an  estimated ejection fraction of 55%. No regional wall motion abnormalities  are seen.  Normal right ventricular size and function. 3/2007 Adenosine Myoview: ([facility])  Negative for ischemia     Plan/Assessment:    1.  Persistent atrial fibrillation (HCC)  -S/P prior MARTHA and DCCV with return to A-fib  -S/P LAAO, Watchman procedure in

## 2017-12-19 DIAGNOSIS — E87.5 HYPERKALEMIA: ICD-10-CM

## 2017-12-19 DIAGNOSIS — D64.9 ANEMIA, UNSPECIFIED TYPE: ICD-10-CM

## 2017-12-19 LAB
ANION GAP SERPL CALCULATED.3IONS-SCNC: 9 MMOL/L (ref 3–16)
BUN BLDV-MCNC: 36 MG/DL (ref 7–20)
CALCIUM SERPL-MCNC: 10.3 MG/DL (ref 8.3–10.6)
CHLORIDE BLD-SCNC: 104 MMOL/L (ref 99–110)
CO2: 28 MMOL/L (ref 21–32)
CREAT SERPL-MCNC: 1.2 MG/DL (ref 0.6–1.2)
GFR AFRICAN AMERICAN: 51
GFR NON-AFRICAN AMERICAN: 42
GLUCOSE BLD-MCNC: 92 MG/DL (ref 70–99)
HCT VFR BLD CALC: 31.9 % (ref 36–48)
HEMOGLOBIN: 10.3 G/DL (ref 12–16)
MCH RBC QN AUTO: 30.6 PG (ref 26–34)
MCHC RBC AUTO-ENTMCNC: 32.3 G/DL (ref 31–36)
MCV RBC AUTO: 94.8 FL (ref 80–100)
PDW BLD-RTO: 15.7 % (ref 12.4–15.4)
PLATELET # BLD: 227 K/UL (ref 135–450)
PMV BLD AUTO: 8.7 FL (ref 5–10.5)
POTASSIUM SERPL-SCNC: 5.7 MMOL/L (ref 3.5–5.1)
RBC # BLD: 3.36 M/UL (ref 4–5.2)
SODIUM BLD-SCNC: 141 MMOL/L (ref 136–145)
WBC # BLD: 7.4 K/UL (ref 4–11)

## 2017-12-26 ENCOUNTER — TELEPHONE (OUTPATIENT)
Dept: CARDIOLOGY CLINIC | Age: 82
End: 2017-12-26

## 2017-12-26 NOTE — TELEPHONE ENCOUNTER
Spoke with patient and relayed recommendation per [employer] RN that patient can go ahead and take Centrum silver with no added K per patient's previous phone call earlier, she expressed understanding

## 2018-01-02 RX ORDER — HYDROCHLOROTHIAZIDE 25 MG/1
TABLET ORAL
Qty: 90 TABLET | Refills: 1 | Status: SHIPPED | OUTPATIENT
Start: 2018-01-02 | End: 2018-07-12 | Stop reason: SDUPTHER

## 2018-05-22 RX ORDER — ASPIRIN 81 MG/1
TABLET, CHEWABLE ORAL
Qty: 30 TABLET | Refills: 0 | Status: SHIPPED | OUTPATIENT
Start: 2018-05-22 | End: 2018-05-29 | Stop reason: ALTCHOICE

## 2018-05-23 ENCOUNTER — TELEPHONE (OUTPATIENT)
Dept: CARDIOLOGY CLINIC | Age: 83
End: 2018-05-23

## 2018-05-29 ENCOUNTER — OFFICE VISIT (OUTPATIENT)
Dept: CARDIOLOGY CLINIC | Age: 83
End: 2018-05-29

## 2018-05-29 VITALS
WEIGHT: 142 LBS | HEIGHT: 57 IN | OXYGEN SATURATION: 95 % | BODY MASS INDEX: 30.63 KG/M2 | HEART RATE: 76 BPM | SYSTOLIC BLOOD PRESSURE: 128 MMHG | DIASTOLIC BLOOD PRESSURE: 78 MMHG

## 2018-05-29 DIAGNOSIS — I48.19 PERSISTENT ATRIAL FIBRILLATION (HCC): Primary | ICD-10-CM

## 2018-05-29 DIAGNOSIS — I10 ESSENTIAL HYPERTENSION: ICD-10-CM

## 2018-05-29 DIAGNOSIS — E78.5 HYPERLIPIDEMIA LDL GOAL <130: ICD-10-CM

## 2018-05-29 DIAGNOSIS — D64.9 ANEMIA, UNSPECIFIED TYPE: ICD-10-CM

## 2018-05-29 DIAGNOSIS — E87.5 HYPERKALEMIA: ICD-10-CM

## 2018-05-29 PROCEDURE — 93000 ELECTROCARDIOGRAM COMPLETE: CPT | Performed by: INTERNAL MEDICINE

## 2018-05-29 PROCEDURE — 1090F PRES/ABSN URINE INCON ASSESS: CPT | Performed by: INTERNAL MEDICINE

## 2018-05-29 PROCEDURE — 1123F ACP DISCUSS/DSCN MKR DOCD: CPT | Performed by: INTERNAL MEDICINE

## 2018-05-29 PROCEDURE — 4040F PNEUMOC VAC/ADMIN/RCVD: CPT | Performed by: INTERNAL MEDICINE

## 2018-05-29 PROCEDURE — 99214 OFFICE O/P EST MOD 30 MIN: CPT | Performed by: INTERNAL MEDICINE

## 2018-05-29 PROCEDURE — G8427 DOCREV CUR MEDS BY ELIG CLIN: HCPCS | Performed by: INTERNAL MEDICINE

## 2018-05-29 PROCEDURE — 1036F TOBACCO NON-USER: CPT | Performed by: INTERNAL MEDICINE

## 2018-05-29 PROCEDURE — G8417 CALC BMI ABV UP PARAM F/U: HCPCS | Performed by: INTERNAL MEDICINE

## 2018-05-29 RX ORDER — ASPIRIN 81 MG/1
162 TABLET ORAL DAILY
Qty: 30 TABLET | Refills: 3
Start: 2018-05-29 | End: 2018-06-19 | Stop reason: ALTCHOICE

## 2018-06-19 RX ORDER — ASPIRIN 81 MG/1
TABLET, CHEWABLE ORAL
Qty: 30 TABLET | Refills: 0 | Status: SHIPPED | OUTPATIENT
Start: 2018-06-19 | End: 2018-07-20 | Stop reason: SDUPTHER

## 2018-07-12 RX ORDER — HYDROCHLOROTHIAZIDE 25 MG/1
TABLET ORAL
Qty: 90 TABLET | Refills: 0 | Status: SHIPPED | OUTPATIENT
Start: 2018-07-12 | End: 2018-10-15 | Stop reason: SDUPTHER

## 2018-07-12 NOTE — TELEPHONE ENCOUNTER
Last O/V:  5/29/18  Next O/V:  12/4/18    Last Labs:  BMP:  12/19/17    Please sign in Dr. Nancy Mejia absence, thank you.

## 2018-07-20 RX ORDER — ASPIRIN 81 MG/1
TABLET, CHEWABLE ORAL
Qty: 30 TABLET | Refills: 0 | Status: SHIPPED | OUTPATIENT
Start: 2018-07-20 | End: 2018-07-23 | Stop reason: SDUPTHER

## 2018-07-24 RX ORDER — ASPIRIN 81 MG/1
TABLET, CHEWABLE ORAL
Qty: 60 TABLET | Refills: 5 | Status: SHIPPED | OUTPATIENT
Start: 2018-07-24 | End: 2019-02-13 | Stop reason: SDUPTHER

## 2018-08-01 ENCOUNTER — TELEPHONE (OUTPATIENT)
Dept: CARDIOLOGY CLINIC | Age: 83
End: 2018-08-01

## 2018-08-01 DIAGNOSIS — I48.19 PERSISTENT ATRIAL FIBRILLATION (HCC): Primary | ICD-10-CM

## 2018-08-03 NOTE — TELEPHONE ENCOUNTER
Gregoria Joya called while Magdalena Ivan was at lunch. I relayed all information to her (time, date, instructions, etc.) and she verbalized understanding.

## 2018-08-03 NOTE — TELEPHONE ENCOUNTER
I called and scheduled Marilynn's Echo for Friday 8/24/18 at 1:00 pm. She is to arrive at [facility] at 12:45 pm.     I called and left a message for Breanna Stahl to return my call, so I can go over all the details with her.

## 2018-08-24 ENCOUNTER — HOSPITAL ENCOUNTER (OUTPATIENT)
Dept: NON INVASIVE DIAGNOSTICS | Age: 83
Discharge: HOME | End: 2018-08-24
Attending: INTERNAL MEDICINE | Admitting: INTERNAL MEDICINE

## 2018-08-24 DIAGNOSIS — I48.19 PERSISTENT ATRIAL FIBRILLATION (HCC): ICD-10-CM

## 2018-08-24 LAB
LV EF: 58 %
LVEF MODALITY: NORMAL

## 2018-10-16 RX ORDER — HYDROCHLOROTHIAZIDE 25 MG/1
TABLET ORAL
Qty: 90 TABLET | Refills: 0 | Status: ON HOLD | OUTPATIENT
Start: 2018-10-16 | End: 2019-04-04 | Stop reason: HOSPADM

## 2018-11-10 DIAGNOSIS — I48.19 PERSISTENT ATRIAL FIBRILLATION (HCC): ICD-10-CM

## 2018-11-13 RX ORDER — CLOPIDOGREL BISULFATE 75 MG/1
75 TABLET ORAL DAILY
Qty: 30 TABLET | Refills: 2 | Status: SHIPPED | OUTPATIENT
Start: 2018-11-13 | End: 2019-05-16

## 2018-12-04 ENCOUNTER — OFFICE VISIT (OUTPATIENT)
Dept: CARDIOLOGY CLINIC | Age: 83
End: 2018-12-04
Payer: MEDICARE

## 2018-12-04 VITALS
BODY MASS INDEX: 30.63 KG/M2 | HEART RATE: 60 BPM | SYSTOLIC BLOOD PRESSURE: 122 MMHG | HEIGHT: 57 IN | DIASTOLIC BLOOD PRESSURE: 78 MMHG | WEIGHT: 142 LBS | OXYGEN SATURATION: 96 %

## 2018-12-04 DIAGNOSIS — I10 ESSENTIAL HYPERTENSION: ICD-10-CM

## 2018-12-04 DIAGNOSIS — I48.21 PERMANENT ATRIAL FIBRILLATION (HCC): Primary | ICD-10-CM

## 2018-12-04 DIAGNOSIS — E78.5 HYPERLIPIDEMIA LDL GOAL <130: ICD-10-CM

## 2018-12-04 DIAGNOSIS — D64.9 ANEMIA, UNSPECIFIED TYPE: ICD-10-CM

## 2018-12-04 PROCEDURE — 93000 ELECTROCARDIOGRAM COMPLETE: CPT | Performed by: INTERNAL MEDICINE

## 2018-12-04 PROCEDURE — G8417 CALC BMI ABV UP PARAM F/U: HCPCS | Performed by: INTERNAL MEDICINE

## 2018-12-04 PROCEDURE — 99214 OFFICE O/P EST MOD 30 MIN: CPT | Performed by: INTERNAL MEDICINE

## 2018-12-04 PROCEDURE — G8484 FLU IMMUNIZE NO ADMIN: HCPCS | Performed by: INTERNAL MEDICINE

## 2018-12-04 PROCEDURE — 1101F PT FALLS ASSESS-DOCD LE1/YR: CPT | Performed by: INTERNAL MEDICINE

## 2018-12-04 PROCEDURE — G8427 DOCREV CUR MEDS BY ELIG CLIN: HCPCS | Performed by: INTERNAL MEDICINE

## 2018-12-04 PROCEDURE — 4040F PNEUMOC VAC/ADMIN/RCVD: CPT | Performed by: INTERNAL MEDICINE

## 2018-12-04 PROCEDURE — 1036F TOBACCO NON-USER: CPT | Performed by: INTERNAL MEDICINE

## 2018-12-04 PROCEDURE — 1123F ACP DISCUSS/DSCN MKR DOCD: CPT | Performed by: INTERNAL MEDICINE

## 2018-12-04 PROCEDURE — 1090F PRES/ABSN URINE INCON ASSESS: CPT | Performed by: INTERNAL MEDICINE

## 2018-12-04 NOTE — PROGRESS NOTES
 tiZANidine (ZANAFLEX) 2 MG tablet TK 1 T PO BID PRN  0    traZODone (DESYREL) 50 MG tablet Take 1 tablet by mouth nightly 30 tablet 0    calcium carbonate 600 MG TABS tablet Take 1 tablet by mouth daily      HYDROcodone-acetaminophen (NORCO)  MG per tablet Take 1 tablet by mouth every 6 hours as needed for Pain      citalopram (CELEXA) 10 MG tablet Take 20 mg by mouth nightly Indications: pt unaware of dosage       CINNAMON PO Take 1 capsule by mouth daily       GARLIC PO Take 1 capsule by mouth daily       loratadine (CLARITIN) 10 MG tablet Take 10 mg by mouth daily.  Omega-3 Fatty Acids (FISH OIL) 1000 MG CAPS Take 1,000 mg by mouth daily.  Coenzyme Q10 100 MG CAPS Take 100 mg by mouth daily.  pravastatin (PRAVACHOL) 40 MG tablet Take 40 mg by mouth every evening.  omeprazole (PRILOSEC) 40 MG capsule Take 40 mg by mouth daily. Take 40 mg by mouth daily.  lisinopril (PRINIVIL;ZESTRIL) 40 MG tablet Take 40 mg by mouth daily. No current facility-administered medications for this visit. Physical Exam:   /78 (Site: Left Upper Arm, Position: Sitting)   Pulse 60   Ht 4' 9\" (1.448 m)   Wt 142 lb (64.4 kg)   SpO2 96%   BMI 30.73 kg/m²   Wt Readings from Last 2 Encounters:   12/04/18 142 lb (64.4 kg)   05/29/18 142 lb (64.4 kg)     Constitutional: She is oriented to person, place, and time. She is elderly and frail in appeaarance. In no acute distress. HEENT: Normocephalic and atraumatic. Sclerae anicteric. No xanthelasmas. EOM's intact. Neck: Neck supple. No JVD present. Carotids without bruits. No mass and no thyromegaly present. No lymphadenopathy present. Cardiovascular: Irreg irreg, normal S1 and S2; no murmur/gallop or rub, PMI nondisplaced  Pulmonary/Chest: Effort normal and breath sounds normal.  Lungs clear to auscultation. Chest wall nontender  Abdominal: soft, nontender, nondistended. + bowel sounds; no organomegaly or bruits.  Aorta

## 2018-12-04 NOTE — LETTER
[address]  Cardiology Office Visit                [facility] HEART [place] [address] [address] [facility] [facility] [address]  Phone: [phone]  Fax: [phone]            December 4, 2018       Patient: Roberta Zamora   MR Number: [id number]   YOB: 1930   Date of Visit: 12/4/2018       Dear Dr. Rivera Vizcaino:    Thank you for the request for consultation for [place] to me. Below are the relevant portions of my assessment and plan of care. The patient is 80 y.o. female with a past medical history significant for persistent atrial fib and S/P LAAO with Watchman device in 7/2017, HTN, dyslipidemia and chronic back pain here for follow up due to c/o SOB with exertion. She underwent MARTHA on 9/8/2017 which showed Watchman device is in place and residual leak noted with normal LV function. She returns to the office today in follow-up. Since we last saw Carole Dupree she reports that she has been feeling well overall. She denies feelings of her heart racing and her breathing has been well. She does not engage in any regular exercise. Denies weight gain, edema, orthopnea/PND, dizziness, syncope or claudication. Review of Systems:  Constitutional: Denies  fatigue, weakness, night sweats or fever. HEENT: Denies new visual changes, ringing in ears, nosebleeds. Respiratory: + JORDAN   Cardiovascular: see HPI  GI: Denies N/V, diarrhea, constipation, abdominal pain, change in bowel habits, melena or hematochezia  : Denies urinary frequency, urgency, incontinence, hematuria or dysuria. Skin: Denies rash, hives, or cyanosis  Musculoskeletal: + chronic back pain  Neurological: Denies syncope or TIA-like symptoms.   Psychiatric: Denies anxiety, insomnia or depression     Social History   Substance Use Topics    Smoking status: Never Smoker    Smokeless tobacco: Never Used    Alcohol use No       Allergies   Allergen Reactions

## 2018-12-13 ENCOUNTER — TELEPHONE (OUTPATIENT)
Dept: CARDIOLOGY CLINIC | Age: 83
End: 2018-12-13

## 2019-02-13 RX ORDER — ASPIRIN 81 MG/1
TABLET, CHEWABLE ORAL
Qty: 60 TABLET | Refills: 1 | Status: ON HOLD | OUTPATIENT
Start: 2019-02-13 | End: 2019-03-03 | Stop reason: HOSPADM

## 2019-02-25 ENCOUNTER — APPOINTMENT (OUTPATIENT)
Dept: CT IMAGING | Age: 84
DRG: 092 | End: 2019-02-25
Payer: MEDICARE

## 2019-02-25 ENCOUNTER — HOSPITAL ENCOUNTER (INPATIENT)
Age: 84
LOS: 5 days | Discharge: NURSING, REHAB OR LONG-TERM CARE | DRG: 092 | End: 2019-03-03
Attending: EMERGENCY MEDICINE | Admitting: INTERNAL MEDICINE
Payer: MEDICARE

## 2019-02-25 ENCOUNTER — APPOINTMENT (OUTPATIENT)
Dept: GENERAL RADIOLOGY | Age: 84
DRG: 092 | End: 2019-02-25
Payer: MEDICARE

## 2019-02-25 DIAGNOSIS — M54.40 LOW BACK PAIN WITH SCIATICA, SCIATICA LATERALITY UNSPECIFIED, UNSPECIFIED BACK PAIN LATERALITY, UNSPECIFIED CHRONICITY: ICD-10-CM

## 2019-02-25 DIAGNOSIS — R41.0 CONFUSION: Primary | ICD-10-CM

## 2019-02-25 DIAGNOSIS — R77.8 ELEVATED TROPONIN: ICD-10-CM

## 2019-02-25 LAB
A/G RATIO: 1.4 (ref 1.1–2.2)
ALBUMIN SERPL-MCNC: 4.2 G/DL (ref 3.4–5)
ALP BLD-CCNC: 42 U/L (ref 40–129)
ALT SERPL-CCNC: 22 U/L (ref 10–40)
ANION GAP SERPL CALCULATED.3IONS-SCNC: 15 MMOL/L (ref 3–16)
AST SERPL-CCNC: 43 U/L (ref 15–37)
BASOPHILS ABSOLUTE: 0 K/UL (ref 0–0.2)
BASOPHILS RELATIVE PERCENT: 0.2 %
BILIRUB SERPL-MCNC: 0.3 MG/DL (ref 0–1)
BUN BLDV-MCNC: 28 MG/DL (ref 7–20)
CALCIUM SERPL-MCNC: 10.2 MG/DL (ref 8.3–10.6)
CHLORIDE BLD-SCNC: 100 MMOL/L (ref 99–110)
CO2: 26 MMOL/L (ref 21–32)
CREAT SERPL-MCNC: 1.1 MG/DL (ref 0.6–1.2)
EOSINOPHILS ABSOLUTE: 0.1 K/UL (ref 0–0.6)
EOSINOPHILS RELATIVE PERCENT: 0.5 %
GFR AFRICAN AMERICAN: 57
GFR NON-AFRICAN AMERICAN: 47
GLOBULIN: 3 G/DL
GLUCOSE BLD-MCNC: 122 MG/DL (ref 70–99)
HCT VFR BLD CALC: 39.9 % (ref 36–48)
HEMOGLOBIN: 13 G/DL (ref 12–16)
LACTIC ACID, SEPSIS: 1.7 MMOL/L (ref 0.4–1.9)
LYMPHOCYTES ABSOLUTE: 2.5 K/UL (ref 1–5.1)
LYMPHOCYTES RELATIVE PERCENT: 24.2 %
MCH RBC QN AUTO: 31.3 PG (ref 26–34)
MCHC RBC AUTO-ENTMCNC: 32.7 G/DL (ref 31–36)
MCV RBC AUTO: 95.7 FL (ref 80–100)
MONOCYTES ABSOLUTE: 1.3 K/UL (ref 0–1.3)
MONOCYTES RELATIVE PERCENT: 12.3 %
NEUTROPHILS ABSOLUTE: 6.6 K/UL (ref 1.7–7.7)
NEUTROPHILS RELATIVE PERCENT: 62.8 %
PDW BLD-RTO: 13.7 % (ref 12.4–15.4)
PLATELET # BLD: 207 K/UL (ref 135–450)
PMV BLD AUTO: 9 FL (ref 5–10.5)
POTASSIUM SERPL-SCNC: 3.9 MMOL/L (ref 3.5–5.1)
PRO-BNP: 3713 PG/ML (ref 0–449)
RBC # BLD: 4.17 M/UL (ref 4–5.2)
SODIUM BLD-SCNC: 141 MMOL/L (ref 136–145)
TOTAL PROTEIN: 7.2 G/DL (ref 6.4–8.2)
TROPONIN: 0.2 NG/ML
WBC # BLD: 10.5 K/UL (ref 4–11)

## 2019-02-25 PROCEDURE — 83605 ASSAY OF LACTIC ACID: CPT

## 2019-02-25 PROCEDURE — 72125 CT NECK SPINE W/O DYE: CPT

## 2019-02-25 PROCEDURE — 93005 ELECTROCARDIOGRAM TRACING: CPT | Performed by: EMERGENCY MEDICINE

## 2019-02-25 PROCEDURE — 80053 COMPREHEN METABOLIC PANEL: CPT

## 2019-02-25 PROCEDURE — 84484 ASSAY OF TROPONIN QUANT: CPT

## 2019-02-25 PROCEDURE — 99285 EMERGENCY DEPT VISIT HI MDM: CPT

## 2019-02-25 PROCEDURE — 70450 CT HEAD/BRAIN W/O DYE: CPT

## 2019-02-25 PROCEDURE — 71045 X-RAY EXAM CHEST 1 VIEW: CPT

## 2019-02-25 PROCEDURE — 85025 COMPLETE CBC W/AUTO DIFF WBC: CPT

## 2019-02-25 PROCEDURE — 83880 ASSAY OF NATRIURETIC PEPTIDE: CPT

## 2019-02-26 ENCOUNTER — APPOINTMENT (OUTPATIENT)
Dept: MRI IMAGING | Age: 84
DRG: 092 | End: 2019-02-26
Payer: MEDICARE

## 2019-02-26 PROBLEM — G93.40 ACUTE ENCEPHALOPATHY: Status: ACTIVE | Noted: 2019-02-26

## 2019-02-26 LAB
AMMONIA: 26 UMOL/L (ref 11–51)
BILIRUBIN URINE: NEGATIVE
BLOOD, URINE: NEGATIVE
CLARITY: CLEAR
COLOR: YELLOW
EKG ATRIAL RATE: 241 BPM
EKG DIAGNOSIS: NORMAL
EKG Q-T INTERVAL: 346 MS
EKG QRS DURATION: 90 MS
EKG QTC CALCULATION (BAZETT): 394 MS
EKG R AXIS: -45 DEGREES
EKG T AXIS: -31 DEGREES
EKG VENTRICULAR RATE: 78 BPM
EPITHELIAL CELLS, UA: 0 /HPF (ref 0–5)
GLUCOSE URINE: NEGATIVE MG/DL
HYALINE CASTS: 1 /LPF (ref 0–8)
KETONES, URINE: NEGATIVE MG/DL
LEUKOCYTE ESTERASE, URINE: NEGATIVE
MICROSCOPIC EXAMINATION: YES
NITRITE, URINE: NEGATIVE
PH UA: 5
PROTEIN UA: ABNORMAL MG/DL
RBC UA: 0 /HPF (ref 0–4)
SPECIFIC GRAVITY UA: 1.01
TROPONIN: 0.08 NG/ML
TROPONIN: 0.13 NG/ML
URINE REFLEX TO CULTURE: ABNORMAL
URINE TYPE: ABNORMAL
UROBILINOGEN, URINE: 0.2 E.U./DL
WBC UA: 0 /HPF (ref 0–5)

## 2019-02-26 PROCEDURE — 92610 EVALUATE SWALLOWING FUNCTION: CPT

## 2019-02-26 PROCEDURE — 93010 ELECTROCARDIOGRAM REPORT: CPT | Performed by: INTERNAL MEDICINE

## 2019-02-26 PROCEDURE — 99223 1ST HOSP IP/OBS HIGH 75: CPT | Performed by: INTERNAL MEDICINE

## 2019-02-26 PROCEDURE — 97166 OT EVAL MOD COMPLEX 45 MIN: CPT

## 2019-02-26 PROCEDURE — 97530 THERAPEUTIC ACTIVITIES: CPT

## 2019-02-26 PROCEDURE — 2580000003 HC RX 258: Performed by: INTERNAL MEDICINE

## 2019-02-26 PROCEDURE — 2060000000 HC ICU INTERMEDIATE R&B

## 2019-02-26 PROCEDURE — 97116 GAIT TRAINING THERAPY: CPT | Performed by: PHYSICAL THERAPIST

## 2019-02-26 PROCEDURE — 97530 THERAPEUTIC ACTIVITIES: CPT | Performed by: PHYSICAL THERAPIST

## 2019-02-26 PROCEDURE — 82140 ASSAY OF AMMONIA: CPT

## 2019-02-26 PROCEDURE — 6370000000 HC RX 637 (ALT 250 FOR IP): Performed by: FAMILY MEDICINE

## 2019-02-26 PROCEDURE — 94760 N-INVAS EAR/PLS OXIMETRY 1: CPT

## 2019-02-26 PROCEDURE — 97163 PT EVAL HIGH COMPLEX 45 MIN: CPT | Performed by: PHYSICAL THERAPIST

## 2019-02-26 PROCEDURE — 97535 SELF CARE MNGMENT TRAINING: CPT

## 2019-02-26 PROCEDURE — 84484 ASSAY OF TROPONIN QUANT: CPT

## 2019-02-26 PROCEDURE — 70551 MRI BRAIN STEM W/O DYE: CPT

## 2019-02-26 PROCEDURE — 81001 URINALYSIS AUTO W/SCOPE: CPT

## 2019-02-26 PROCEDURE — 6360000002 HC RX W HCPCS: Performed by: INTERNAL MEDICINE

## 2019-02-26 PROCEDURE — 36415 COLL VENOUS BLD VENIPUNCTURE: CPT

## 2019-02-26 PROCEDURE — 6370000000 HC RX 637 (ALT 250 FOR IP): Performed by: INTERNAL MEDICINE

## 2019-02-26 RX ORDER — PANTOPRAZOLE SODIUM 40 MG/1
40 TABLET, DELAYED RELEASE ORAL
Status: DISCONTINUED | OUTPATIENT
Start: 2019-02-26 | End: 2019-03-03 | Stop reason: HOSPADM

## 2019-02-26 RX ORDER — TRAMADOL HYDROCHLORIDE 50 MG/1
25 TABLET ORAL EVERY 6 HOURS PRN
Status: DISCONTINUED | OUTPATIENT
Start: 2019-02-26 | End: 2019-02-26

## 2019-02-26 RX ORDER — CLOPIDOGREL BISULFATE 75 MG/1
75 TABLET ORAL DAILY
Status: DISCONTINUED | OUTPATIENT
Start: 2019-02-26 | End: 2019-03-03 | Stop reason: HOSPADM

## 2019-02-26 RX ORDER — SODIUM CHLORIDE 0.9 % (FLUSH) 0.9 %
10 SYRINGE (ML) INJECTION PRN
Status: DISCONTINUED | OUTPATIENT
Start: 2019-02-26 | End: 2019-03-03 | Stop reason: HOSPADM

## 2019-02-26 RX ORDER — ONDANSETRON 2 MG/ML
4 INJECTION INTRAMUSCULAR; INTRAVENOUS EVERY 6 HOURS PRN
Status: DISCONTINUED | OUTPATIENT
Start: 2019-02-26 | End: 2019-03-03 | Stop reason: HOSPADM

## 2019-02-26 RX ORDER — PRAVASTATIN SODIUM 40 MG
40 TABLET ORAL EVERY EVENING
Status: DISCONTINUED | OUTPATIENT
Start: 2019-02-26 | End: 2019-03-03 | Stop reason: HOSPADM

## 2019-02-26 RX ORDER — HYDROCHLOROTHIAZIDE 25 MG/1
25 TABLET ORAL DAILY
Status: DISCONTINUED | OUTPATIENT
Start: 2019-02-26 | End: 2019-03-03 | Stop reason: HOSPADM

## 2019-02-26 RX ORDER — TIZANIDINE 4 MG/1
4 TABLET ORAL EVERY 8 HOURS PRN
Status: ON HOLD | COMMUNITY
End: 2019-03-03 | Stop reason: HOSPADM

## 2019-02-26 RX ORDER — SODIUM CHLORIDE 0.9 % (FLUSH) 0.9 %
10 SYRINGE (ML) INJECTION EVERY 12 HOURS SCHEDULED
Status: DISCONTINUED | OUTPATIENT
Start: 2019-02-26 | End: 2019-03-03 | Stop reason: HOSPADM

## 2019-02-26 RX ORDER — LISINOPRIL 20 MG/1
40 TABLET ORAL DAILY
Status: DISCONTINUED | OUTPATIENT
Start: 2019-02-26 | End: 2019-03-03 | Stop reason: HOSPADM

## 2019-02-26 RX ORDER — ASPIRIN 81 MG/1
81 TABLET, CHEWABLE ORAL DAILY
Status: DISCONTINUED | OUTPATIENT
Start: 2019-02-26 | End: 2019-03-03 | Stop reason: HOSPADM

## 2019-02-26 RX ORDER — VENLAFAXINE HYDROCHLORIDE 75 MG/1
75 CAPSULE, EXTENDED RELEASE ORAL NIGHTLY
COMMUNITY

## 2019-02-26 RX ORDER — HYDROCODONE BITARTRATE AND ACETAMINOPHEN 7.5; 3 MG/1; MG/1
TABLET ORAL EVERY 6 HOURS
Status: ON HOLD | COMMUNITY
End: 2019-03-03 | Stop reason: HOSPADM

## 2019-02-26 RX ADMIN — LISINOPRIL 40 MG: 20 TABLET ORAL at 08:27

## 2019-02-26 RX ADMIN — ASPIRIN 81 MG 81 MG: 81 TABLET ORAL at 08:27

## 2019-02-26 RX ADMIN — Medication 10 ML: at 20:49

## 2019-02-26 RX ADMIN — CLOPIDOGREL BISULFATE 75 MG: 75 TABLET ORAL at 08:26

## 2019-02-26 RX ADMIN — Medication 10 ML: at 08:27

## 2019-02-26 RX ADMIN — HYDROCHLOROTHIAZIDE 25 MG: 25 TABLET ORAL at 08:27

## 2019-02-26 RX ADMIN — ENOXAPARIN SODIUM 30 MG: 30 INJECTION SUBCUTANEOUS at 14:54

## 2019-02-26 RX ADMIN — PANTOPRAZOLE SODIUM 40 MG: 40 TABLET, DELAYED RELEASE ORAL at 08:26

## 2019-02-26 RX ADMIN — TRAMADOL HYDROCHLORIDE 25 MG: 50 TABLET, FILM COATED ORAL at 11:33

## 2019-02-26 RX ADMIN — PRAVASTATIN SODIUM 40 MG: 40 TABLET ORAL at 18:02

## 2019-02-26 ASSESSMENT — PAIN SCALES - GENERAL
PAINLEVEL_OUTOF10: 0
PAINLEVEL_OUTOF10: 10
PAINLEVEL_OUTOF10: 8
PAINLEVEL_OUTOF10: 0

## 2019-02-26 ASSESSMENT — PAIN DESCRIPTION - LOCATION
LOCATION: BACK
LOCATION: BACK

## 2019-02-26 ASSESSMENT — PAIN DESCRIPTION - PAIN TYPE
TYPE: CHRONIC PAIN
TYPE: CHRONIC PAIN

## 2019-02-27 ENCOUNTER — APPOINTMENT (OUTPATIENT)
Dept: CT IMAGING | Age: 84
DRG: 092 | End: 2019-02-27
Payer: MEDICARE

## 2019-02-27 ENCOUNTER — APPOINTMENT (OUTPATIENT)
Dept: MRI IMAGING | Age: 84
DRG: 092 | End: 2019-02-27
Payer: MEDICARE

## 2019-02-27 LAB
ANION GAP SERPL CALCULATED.3IONS-SCNC: 15 MMOL/L (ref 3–16)
ANTI-XA LOV PROPHYLAXIS: 0.2 IU/ML (ref 0.3–0.6)
ATYPICAL LYMPHOCYTE RELATIVE PERCENT: 1 % (ref 0–6)
BASOPHILS ABSOLUTE: 0 K/UL (ref 0–0.2)
BASOPHILS RELATIVE PERCENT: 0 %
BUN BLDV-MCNC: 31 MG/DL (ref 7–20)
CALCIUM SERPL-MCNC: 10 MG/DL (ref 8.3–10.6)
CHLORIDE BLD-SCNC: 102 MMOL/L (ref 99–110)
CO2: 23 MMOL/L (ref 21–32)
CREAT SERPL-MCNC: 1.3 MG/DL (ref 0.6–1.2)
EOSINOPHILS ABSOLUTE: 0 K/UL (ref 0–0.6)
EOSINOPHILS RELATIVE PERCENT: 0 %
GFR AFRICAN AMERICAN: 47
GFR NON-AFRICAN AMERICAN: 39
GLUCOSE BLD-MCNC: 177 MG/DL (ref 70–99)
GLUCOSE BLD-MCNC: 94 MG/DL (ref 70–99)
HCT VFR BLD CALC: 46 % (ref 36–48)
HEMOGLOBIN: 14.7 G/DL (ref 12–16)
LV EF: 58 %
LVEF MODALITY: NORMAL
LYMPHOCYTES ABSOLUTE: 2 K/UL (ref 1–5.1)
LYMPHOCYTES RELATIVE PERCENT: 14 %
MAGNESIUM: 1.7 MG/DL (ref 1.8–2.4)
MCH RBC QN AUTO: 31 PG (ref 26–34)
MCHC RBC AUTO-ENTMCNC: 32.1 G/DL (ref 31–36)
MCV RBC AUTO: 96.8 FL (ref 80–100)
MONOCYTES ABSOLUTE: 1.2 K/UL (ref 0–1.3)
MONOCYTES RELATIVE PERCENT: 9 %
NEUTROPHILS ABSOLUTE: 9.9 K/UL (ref 1.7–7.7)
NEUTROPHILS RELATIVE PERCENT: 76 %
PDW BLD-RTO: 13.6 % (ref 12.4–15.4)
PERFORMED ON: ABNORMAL
PLATELET # BLD: 190 K/UL (ref 135–450)
PLATELET SLIDE REVIEW: ADEQUATE
PMV BLD AUTO: 8.3 FL (ref 5–10.5)
POTASSIUM SERPL-SCNC: 4 MMOL/L (ref 3.5–5.1)
RBC # BLD: 4.75 M/UL (ref 4–5.2)
RBC # BLD: NORMAL 10*6/UL
SLIDE REVIEW: ABNORMAL
SMUDGE CELLS: PRESENT
SODIUM BLD-SCNC: 140 MMOL/L (ref 136–145)
WBC # BLD: 13 K/UL (ref 4–11)

## 2019-02-27 PROCEDURE — 6360000002 HC RX W HCPCS: Performed by: INTERNAL MEDICINE

## 2019-02-27 PROCEDURE — 6370000000 HC RX 637 (ALT 250 FOR IP): Performed by: PHYSICIAN ASSISTANT

## 2019-02-27 PROCEDURE — 70551 MRI BRAIN STEM W/O DYE: CPT

## 2019-02-27 PROCEDURE — 83735 ASSAY OF MAGNESIUM: CPT

## 2019-02-27 PROCEDURE — 36415 COLL VENOUS BLD VENIPUNCTURE: CPT

## 2019-02-27 PROCEDURE — 6360000004 HC RX CONTRAST MEDICATION: Performed by: INTERNAL MEDICINE

## 2019-02-27 PROCEDURE — 70450 CT HEAD/BRAIN W/O DYE: CPT

## 2019-02-27 PROCEDURE — 93306 TTE W/DOPPLER COMPLETE: CPT

## 2019-02-27 PROCEDURE — 2580000003 HC RX 258: Performed by: INTERNAL MEDICINE

## 2019-02-27 PROCEDURE — 2060000000 HC ICU INTERMEDIATE R&B

## 2019-02-27 PROCEDURE — 85025 COMPLETE CBC W/AUTO DIFF WBC: CPT

## 2019-02-27 PROCEDURE — 70496 CT ANGIOGRAPHY HEAD: CPT

## 2019-02-27 PROCEDURE — 87086 URINE CULTURE/COLONY COUNT: CPT

## 2019-02-27 PROCEDURE — 6370000000 HC RX 637 (ALT 250 FOR IP): Performed by: INTERNAL MEDICINE

## 2019-02-27 PROCEDURE — 99232 SBSQ HOSP IP/OBS MODERATE 35: CPT | Performed by: NURSE PRACTITIONER

## 2019-02-27 PROCEDURE — 94760 N-INVAS EAR/PLS OXIMETRY 1: CPT

## 2019-02-27 PROCEDURE — 70498 CT ANGIOGRAPHY NECK: CPT

## 2019-02-27 PROCEDURE — 85520 HEPARIN ASSAY: CPT

## 2019-02-27 PROCEDURE — 92526 ORAL FUNCTION THERAPY: CPT

## 2019-02-27 PROCEDURE — 80048 BASIC METABOLIC PNL TOTAL CA: CPT

## 2019-02-27 RX ORDER — QUETIAPINE FUMARATE 25 MG/1
25 TABLET, FILM COATED ORAL ONCE
Status: COMPLETED | OUTPATIENT
Start: 2019-02-27 | End: 2019-02-27

## 2019-02-27 RX ORDER — HYDRALAZINE HYDROCHLORIDE 20 MG/ML
10 INJECTION INTRAMUSCULAR; INTRAVENOUS EVERY 6 HOURS PRN
Status: DISCONTINUED | OUTPATIENT
Start: 2019-02-27 | End: 2019-03-03 | Stop reason: HOSPADM

## 2019-02-27 RX ORDER — MAGNESIUM SULFATE 1 G/100ML
1 INJECTION INTRAVENOUS ONCE
Status: COMPLETED | OUTPATIENT
Start: 2019-02-27 | End: 2019-02-27

## 2019-02-27 RX ORDER — CLONIDINE HYDROCHLORIDE 0.1 MG/1
0.1 TABLET ORAL PRN
Status: DISCONTINUED | OUTPATIENT
Start: 2019-02-27 | End: 2019-03-03 | Stop reason: HOSPADM

## 2019-02-27 RX ORDER — HYDROCODONE BITARTRATE AND ACETAMINOPHEN 5; 325 MG/1; MG/1
1 TABLET ORAL EVERY 8 HOURS PRN
Status: DISCONTINUED | OUTPATIENT
Start: 2019-02-27 | End: 2019-03-03 | Stop reason: HOSPADM

## 2019-02-27 RX ORDER — TRAMADOL HYDROCHLORIDE 50 MG/1
25 TABLET ORAL PRN
Status: DISPENSED | OUTPATIENT
Start: 2019-02-27 | End: 2019-03-02

## 2019-02-27 RX ADMIN — TRAMADOL HYDROCHLORIDE 25 MG: 50 TABLET, FILM COATED ORAL at 01:18

## 2019-02-27 RX ADMIN — HYDRALAZINE HYDROCHLORIDE 10 MG: 20 INJECTION INTRAMUSCULAR; INTRAVENOUS at 16:05

## 2019-02-27 RX ADMIN — QUETIAPINE FUMARATE 25 MG: 25 TABLET ORAL at 21:33

## 2019-02-27 RX ADMIN — IOPAMIDOL 75 ML: 755 INJECTION, SOLUTION INTRAVENOUS at 11:42

## 2019-02-27 RX ADMIN — LISINOPRIL 40 MG: 20 TABLET ORAL at 08:24

## 2019-02-27 RX ADMIN — CLONIDINE HYDROCHLORIDE 0.1 MG: 0.1 TABLET ORAL at 03:14

## 2019-02-27 RX ADMIN — CLONIDINE HYDROCHLORIDE 0.1 MG: 0.1 TABLET ORAL at 01:18

## 2019-02-27 RX ADMIN — MAGNESIUM SULFATE HEPTAHYDRATE 1 G: 1 INJECTION, SOLUTION INTRAVENOUS at 15:14

## 2019-02-27 RX ADMIN — ENOXAPARIN SODIUM 30 MG: 30 INJECTION SUBCUTANEOUS at 08:24

## 2019-02-27 RX ADMIN — ASPIRIN 81 MG 81 MG: 81 TABLET ORAL at 08:24

## 2019-02-27 RX ADMIN — PANTOPRAZOLE SODIUM 40 MG: 40 TABLET, DELAYED RELEASE ORAL at 05:50

## 2019-02-27 RX ADMIN — HYDROCODONE BITARTRATE AND ACETAMINOPHEN 1 TABLET: 5; 325 TABLET ORAL at 16:05

## 2019-02-27 RX ADMIN — PRAVASTATIN SODIUM 40 MG: 40 TABLET ORAL at 21:33

## 2019-02-27 RX ADMIN — Medication 10 ML: at 08:25

## 2019-02-27 RX ADMIN — CLOPIDOGREL BISULFATE 75 MG: 75 TABLET ORAL at 08:24

## 2019-02-27 RX ADMIN — TRAMADOL HYDROCHLORIDE 25 MG: 50 TABLET, FILM COATED ORAL at 03:14

## 2019-02-27 RX ADMIN — Medication 2 MG: at 01:19

## 2019-02-27 RX ADMIN — Medication 10 ML: at 21:39

## 2019-02-27 RX ADMIN — HYDROCHLOROTHIAZIDE 25 MG: 25 TABLET ORAL at 08:24

## 2019-02-27 ASSESSMENT — PAIN SCALES - GENERAL
PAINLEVEL_OUTOF10: 0
PAINLEVEL_OUTOF10: 5
PAINLEVEL_OUTOF10: 10
PAINLEVEL_OUTOF10: 0
PAINLEVEL_OUTOF10: 0
PAINLEVEL_OUTOF10: 4

## 2019-02-28 LAB
ANION GAP SERPL CALCULATED.3IONS-SCNC: 19 MMOL/L (ref 3–16)
BACTERIA: ABNORMAL /HPF
BASOPHILS ABSOLUTE: 0.1 K/UL (ref 0–0.2)
BASOPHILS RELATIVE PERCENT: 0.8 %
BILIRUBIN URINE: NEGATIVE
BLOOD, URINE: NEGATIVE
BUN BLDV-MCNC: 37 MG/DL (ref 7–20)
CALCIUM SERPL-MCNC: 10.1 MG/DL (ref 8.3–10.6)
CHLORIDE BLD-SCNC: 100 MMOL/L (ref 99–110)
CHOLESTEROL, TOTAL: 145 MG/DL (ref 0–199)
CLARITY: ABNORMAL
CO2: 19 MMOL/L (ref 21–32)
COLOR: YELLOW
CREAT SERPL-MCNC: 1.1 MG/DL (ref 0.6–1.2)
EOSINOPHILS ABSOLUTE: 0.1 K/UL (ref 0–0.6)
EOSINOPHILS RELATIVE PERCENT: 0.5 %
EPITHELIAL CELLS, UA: 3 /HPF (ref 0–5)
GFR AFRICAN AMERICAN: 57
GFR NON-AFRICAN AMERICAN: 47
GLUCOSE BLD-MCNC: 115 MG/DL (ref 70–99)
GLUCOSE URINE: NEGATIVE MG/DL
HCT VFR BLD CALC: 45.9 % (ref 36–48)
HDLC SERPL-MCNC: 81 MG/DL (ref 40–60)
HEMOGLOBIN: 14.7 G/DL (ref 12–16)
HYALINE CASTS: 0 /LPF (ref 0–8)
KETONES, URINE: NEGATIVE MG/DL
LDL CHOLESTEROL CALCULATED: 47 MG/DL
LEUKOCYTE ESTERASE, URINE: ABNORMAL
LYMPHOCYTES ABSOLUTE: 2 K/UL (ref 1–5.1)
LYMPHOCYTES RELATIVE PERCENT: 16.7 %
MAGNESIUM: 2.2 MG/DL (ref 1.8–2.4)
MCH RBC QN AUTO: 31.6 PG (ref 26–34)
MCHC RBC AUTO-ENTMCNC: 32 G/DL (ref 31–36)
MCV RBC AUTO: 98.7 FL (ref 80–100)
MICROSCOPIC EXAMINATION: YES
MONOCYTES ABSOLUTE: 1.4 K/UL (ref 0–1.3)
MONOCYTES RELATIVE PERCENT: 11 %
NEUTROPHILS ABSOLUTE: 8.7 K/UL (ref 1.7–7.7)
NEUTROPHILS RELATIVE PERCENT: 71 %
NITRITE, URINE: NEGATIVE
PDW BLD-RTO: 14 % (ref 12.4–15.4)
PH UA: 5.5
PLATELET # BLD: 200 K/UL (ref 135–450)
PMV BLD AUTO: 8.6 FL (ref 5–10.5)
POTASSIUM SERPL-SCNC: 4.2 MMOL/L (ref 3.5–5.1)
PROTEIN UA: NEGATIVE MG/DL
RBC # BLD: 4.66 M/UL (ref 4–5.2)
RBC UA: 1 /HPF (ref 0–4)
SODIUM BLD-SCNC: 138 MMOL/L (ref 136–145)
SPECIFIC GRAVITY UA: 1.02
TRIGL SERPL-MCNC: 86 MG/DL (ref 0–150)
URINE TYPE: ABNORMAL
UROBILINOGEN, URINE: 0.2 E.U./DL
VLDLC SERPL CALC-MCNC: 17 MG/DL
WBC # BLD: 12.3 K/UL (ref 4–11)
WBC UA: 13 /HPF (ref 0–5)

## 2019-02-28 PROCEDURE — 85025 COMPLETE CBC W/AUTO DIFF WBC: CPT

## 2019-02-28 PROCEDURE — 36415 COLL VENOUS BLD VENIPUNCTURE: CPT

## 2019-02-28 PROCEDURE — 80061 LIPID PANEL: CPT

## 2019-02-28 PROCEDURE — 80048 BASIC METABOLIC PNL TOTAL CA: CPT

## 2019-02-28 PROCEDURE — 99232 SBSQ HOSP IP/OBS MODERATE 35: CPT | Performed by: NURSE PRACTITIONER

## 2019-02-28 PROCEDURE — 94760 N-INVAS EAR/PLS OXIMETRY 1: CPT

## 2019-02-28 PROCEDURE — 6370000000 HC RX 637 (ALT 250 FOR IP): Performed by: INTERNAL MEDICINE

## 2019-02-28 PROCEDURE — 83735 ASSAY OF MAGNESIUM: CPT

## 2019-02-28 PROCEDURE — 2060000000 HC ICU INTERMEDIATE R&B

## 2019-02-28 PROCEDURE — 2580000003 HC RX 258: Performed by: INTERNAL MEDICINE

## 2019-02-28 PROCEDURE — 6370000000 HC RX 637 (ALT 250 FOR IP): Performed by: PHYSICIAN ASSISTANT

## 2019-02-28 PROCEDURE — 81001 URINALYSIS AUTO W/SCOPE: CPT

## 2019-02-28 PROCEDURE — 6360000002 HC RX W HCPCS: Performed by: INTERNAL MEDICINE

## 2019-02-28 PROCEDURE — 97535 SELF CARE MNGMENT TRAINING: CPT

## 2019-02-28 PROCEDURE — 97116 GAIT TRAINING THERAPY: CPT | Performed by: PHYSICAL THERAPIST

## 2019-02-28 PROCEDURE — 97530 THERAPEUTIC ACTIVITIES: CPT

## 2019-02-28 PROCEDURE — 97530 THERAPEUTIC ACTIVITIES: CPT | Performed by: PHYSICAL THERAPIST

## 2019-02-28 RX ADMIN — LISINOPRIL 40 MG: 20 TABLET ORAL at 08:35

## 2019-02-28 RX ADMIN — HYDROCODONE BITARTRATE AND ACETAMINOPHEN 1 TABLET: 5; 325 TABLET ORAL at 15:16

## 2019-02-28 RX ADMIN — Medication 10 ML: at 22:22

## 2019-02-28 RX ADMIN — ASPIRIN 81 MG 81 MG: 81 TABLET ORAL at 08:36

## 2019-02-28 RX ADMIN — CEFTRIAXONE 1 G: 1 INJECTION, POWDER, FOR SOLUTION INTRAMUSCULAR; INTRAVENOUS at 17:53

## 2019-02-28 RX ADMIN — CLOPIDOGREL BISULFATE 75 MG: 75 TABLET ORAL at 08:36

## 2019-02-28 RX ADMIN — Medication 10 ML: at 08:37

## 2019-02-28 RX ADMIN — HYDROCHLOROTHIAZIDE 25 MG: 25 TABLET ORAL at 08:35

## 2019-02-28 RX ADMIN — ENOXAPARIN SODIUM 30 MG: 30 INJECTION SUBCUTANEOUS at 08:36

## 2019-02-28 RX ADMIN — Medication 2 MG: at 22:20

## 2019-02-28 RX ADMIN — HYDROCODONE BITARTRATE AND ACETAMINOPHEN 1 TABLET: 5; 325 TABLET ORAL at 07:39

## 2019-02-28 RX ADMIN — PRAVASTATIN SODIUM 40 MG: 40 TABLET ORAL at 17:15

## 2019-02-28 ASSESSMENT — PAIN SCALES - GENERAL
PAINLEVEL_OUTOF10: 0
PAINLEVEL_OUTOF10: 8
PAINLEVEL_OUTOF10: 0

## 2019-02-28 ASSESSMENT — PAIN DESCRIPTION - LOCATION
LOCATION: BACK

## 2019-02-28 ASSESSMENT — PAIN DESCRIPTION - PAIN TYPE: TYPE: CHRONIC PAIN

## 2019-03-01 LAB
ANION GAP SERPL CALCULATED.3IONS-SCNC: 15 MMOL/L (ref 3–16)
BASOPHILS ABSOLUTE: 0 K/UL (ref 0–0.2)
BASOPHILS RELATIVE PERCENT: 0 %
BUN BLDV-MCNC: 38 MG/DL (ref 7–20)
CALCIUM SERPL-MCNC: 9.9 MG/DL (ref 8.3–10.6)
CHLORIDE BLD-SCNC: 104 MMOL/L (ref 99–110)
CO2: 22 MMOL/L (ref 21–32)
CREAT SERPL-MCNC: 1.3 MG/DL (ref 0.6–1.2)
EOSINOPHILS ABSOLUTE: 0 K/UL (ref 0–0.6)
EOSINOPHILS RELATIVE PERCENT: 0 %
GFR AFRICAN AMERICAN: 47
GFR NON-AFRICAN AMERICAN: 39
GLUCOSE BLD-MCNC: 106 MG/DL (ref 70–99)
HCT VFR BLD CALC: 44.6 % (ref 36–48)
HEMATOLOGY PATH CONSULT: NORMAL
HEMATOLOGY PATH CONSULT: YES
HEMOGLOBIN: 14.5 G/DL (ref 12–16)
LYMPHOCYTES ABSOLUTE: 2.4 K/UL (ref 1–5.1)
LYMPHOCYTES RELATIVE PERCENT: 18 %
MCH RBC QN AUTO: 31.1 PG (ref 26–34)
MCHC RBC AUTO-ENTMCNC: 32.4 G/DL (ref 31–36)
MCV RBC AUTO: 95.9 FL (ref 80–100)
MONOCYTES ABSOLUTE: 2 K/UL (ref 0–1.3)
MONOCYTES RELATIVE PERCENT: 15 %
NEUTROPHILS ABSOLUTE: 9 K/UL (ref 1.7–7.7)
NEUTROPHILS RELATIVE PERCENT: 67 %
PDW BLD-RTO: 13.8 % (ref 12.4–15.4)
PLATELET # BLD: 201 K/UL (ref 135–450)
PLATELET SLIDE REVIEW: ADEQUATE
PMV BLD AUTO: 8.4 FL (ref 5–10.5)
POTASSIUM SERPL-SCNC: 4.2 MMOL/L (ref 3.5–5.1)
RBC # BLD: 4.65 M/UL (ref 4–5.2)
RBC # BLD: NORMAL 10*6/UL
SLIDE REVIEW: ABNORMAL
SODIUM BLD-SCNC: 141 MMOL/L (ref 136–145)
WBC # BLD: 13.5 K/UL (ref 4–11)

## 2019-03-01 PROCEDURE — 97530 THERAPEUTIC ACTIVITIES: CPT | Performed by: PHYSICAL THERAPIST

## 2019-03-01 PROCEDURE — 6360000002 HC RX W HCPCS: Performed by: INTERNAL MEDICINE

## 2019-03-01 PROCEDURE — 6370000000 HC RX 637 (ALT 250 FOR IP): Performed by: PHYSICIAN ASSISTANT

## 2019-03-01 PROCEDURE — 92526 ORAL FUNCTION THERAPY: CPT

## 2019-03-01 PROCEDURE — 94760 N-INVAS EAR/PLS OXIMETRY 1: CPT

## 2019-03-01 PROCEDURE — 97127 HC SP THER IVNTJ W/FOCUS COG FUNCJ: CPT

## 2019-03-01 PROCEDURE — 2580000003 HC RX 258: Performed by: INTERNAL MEDICINE

## 2019-03-01 PROCEDURE — 6370000000 HC RX 637 (ALT 250 FOR IP): Performed by: INTERNAL MEDICINE

## 2019-03-01 PROCEDURE — 2060000000 HC ICU INTERMEDIATE R&B

## 2019-03-01 PROCEDURE — 97116 GAIT TRAINING THERAPY: CPT | Performed by: PHYSICAL THERAPIST

## 2019-03-01 PROCEDURE — 6370000000 HC RX 637 (ALT 250 FOR IP): Performed by: NURSE PRACTITIONER

## 2019-03-01 PROCEDURE — 97535 SELF CARE MNGMENT TRAINING: CPT

## 2019-03-01 PROCEDURE — 80048 BASIC METABOLIC PNL TOTAL CA: CPT

## 2019-03-01 PROCEDURE — 97530 THERAPEUTIC ACTIVITIES: CPT

## 2019-03-01 PROCEDURE — 85025 COMPLETE CBC W/AUTO DIFF WBC: CPT

## 2019-03-01 PROCEDURE — 36415 COLL VENOUS BLD VENIPUNCTURE: CPT

## 2019-03-01 RX ORDER — LEVETIRACETAM 500 MG/1
500 TABLET ORAL 2 TIMES DAILY
Status: DISCONTINUED | OUTPATIENT
Start: 2019-03-01 | End: 2019-03-03 | Stop reason: HOSPADM

## 2019-03-01 RX ADMIN — LISINOPRIL 40 MG: 20 TABLET ORAL at 07:28

## 2019-03-01 RX ADMIN — HYDROCODONE BITARTRATE AND ACETAMINOPHEN 1 TABLET: 5; 325 TABLET ORAL at 01:11

## 2019-03-01 RX ADMIN — Medication 10 ML: at 20:35

## 2019-03-01 RX ADMIN — CLOPIDOGREL BISULFATE 75 MG: 75 TABLET ORAL at 07:29

## 2019-03-01 RX ADMIN — CEFTRIAXONE 1 G: 1 INJECTION, POWDER, FOR SOLUTION INTRAMUSCULAR; INTRAVENOUS at 17:45

## 2019-03-01 RX ADMIN — LEVETIRACETAM 500 MG: 500 TABLET, FILM COATED ORAL at 20:34

## 2019-03-01 RX ADMIN — ASPIRIN 81 MG 81 MG: 81 TABLET ORAL at 07:28

## 2019-03-01 RX ADMIN — TRAMADOL HYDROCHLORIDE 25 MG: 50 TABLET, FILM COATED ORAL at 07:28

## 2019-03-01 RX ADMIN — Medication 2 MG: at 20:34

## 2019-03-01 RX ADMIN — PANTOPRAZOLE SODIUM 40 MG: 40 TABLET, DELAYED RELEASE ORAL at 05:24

## 2019-03-01 RX ADMIN — Medication 10 ML: at 07:32

## 2019-03-01 RX ADMIN — PRAVASTATIN SODIUM 40 MG: 40 TABLET ORAL at 17:44

## 2019-03-01 RX ADMIN — HYDROCHLOROTHIAZIDE 25 MG: 25 TABLET ORAL at 07:29

## 2019-03-01 RX ADMIN — ENOXAPARIN SODIUM 30 MG: 30 INJECTION SUBCUTANEOUS at 07:32

## 2019-03-01 ASSESSMENT — PAIN SCALES - GENERAL
PAINLEVEL_OUTOF10: 4
PAINLEVEL_OUTOF10: 10
PAINLEVEL_OUTOF10: 6

## 2019-03-02 LAB
ANION GAP SERPL CALCULATED.3IONS-SCNC: 14 MMOL/L (ref 3–16)
BASOPHILS ABSOLUTE: 0.1 K/UL (ref 0–0.2)
BASOPHILS RELATIVE PERCENT: 0.5 %
BUN BLDV-MCNC: 44 MG/DL (ref 7–20)
CALCIUM SERPL-MCNC: 9.8 MG/DL (ref 8.3–10.6)
CHLORIDE BLD-SCNC: 103 MMOL/L (ref 99–110)
CO2: 23 MMOL/L (ref 21–32)
CREAT SERPL-MCNC: 1.3 MG/DL (ref 0.6–1.2)
EOSINOPHILS ABSOLUTE: 0.2 K/UL (ref 0–0.6)
EOSINOPHILS RELATIVE PERCENT: 1.4 %
GFR AFRICAN AMERICAN: 47
GFR NON-AFRICAN AMERICAN: 39
GLUCOSE BLD-MCNC: 104 MG/DL (ref 70–99)
HCT VFR BLD CALC: 43.8 % (ref 36–48)
HEMOGLOBIN: 14.2 G/DL (ref 12–16)
LYMPHOCYTES ABSOLUTE: 3.3 K/UL (ref 1–5.1)
LYMPHOCYTES RELATIVE PERCENT: 29.2 %
MCH RBC QN AUTO: 31.2 PG (ref 26–34)
MCHC RBC AUTO-ENTMCNC: 32.3 G/DL (ref 31–36)
MCV RBC AUTO: 96.4 FL (ref 80–100)
MONOCYTES ABSOLUTE: 1.3 K/UL (ref 0–1.3)
MONOCYTES RELATIVE PERCENT: 11.3 %
NEUTROPHILS ABSOLUTE: 6.5 K/UL (ref 1.7–7.7)
NEUTROPHILS RELATIVE PERCENT: 57.6 %
PDW BLD-RTO: 13.6 % (ref 12.4–15.4)
PLATELET # BLD: 221 K/UL (ref 135–450)
PMV BLD AUTO: 9.3 FL (ref 5–10.5)
POTASSIUM SERPL-SCNC: 4.2 MMOL/L (ref 3.5–5.1)
RBC # BLD: 4.54 M/UL (ref 4–5.2)
SODIUM BLD-SCNC: 140 MMOL/L (ref 136–145)
WBC # BLD: 11.3 K/UL (ref 4–11)

## 2019-03-02 PROCEDURE — 2580000003 HC RX 258: Performed by: INTERNAL MEDICINE

## 2019-03-02 PROCEDURE — 36415 COLL VENOUS BLD VENIPUNCTURE: CPT

## 2019-03-02 PROCEDURE — G0008 ADMIN INFLUENZA VIRUS VAC: HCPCS | Performed by: INTERNAL MEDICINE

## 2019-03-02 PROCEDURE — 2060000000 HC ICU INTERMEDIATE R&B

## 2019-03-02 PROCEDURE — 6370000000 HC RX 637 (ALT 250 FOR IP): Performed by: INTERNAL MEDICINE

## 2019-03-02 PROCEDURE — 6360000002 HC RX W HCPCS: Performed by: INTERNAL MEDICINE

## 2019-03-02 PROCEDURE — 6370000000 HC RX 637 (ALT 250 FOR IP): Performed by: PHYSICIAN ASSISTANT

## 2019-03-02 PROCEDURE — 94760 N-INVAS EAR/PLS OXIMETRY 1: CPT

## 2019-03-02 PROCEDURE — 80048 BASIC METABOLIC PNL TOTAL CA: CPT

## 2019-03-02 PROCEDURE — 6370000000 HC RX 637 (ALT 250 FOR IP): Performed by: NURSE PRACTITIONER

## 2019-03-02 PROCEDURE — 90686 IIV4 VACC NO PRSV 0.5 ML IM: CPT | Performed by: INTERNAL MEDICINE

## 2019-03-02 PROCEDURE — 85025 COMPLETE CBC W/AUTO DIFF WBC: CPT

## 2019-03-02 RX ADMIN — Medication 10 ML: at 11:06

## 2019-03-02 RX ADMIN — LEVETIRACETAM 500 MG: 500 TABLET, FILM COATED ORAL at 21:08

## 2019-03-02 RX ADMIN — CEFTRIAXONE 1 G: 1 INJECTION, POWDER, FOR SOLUTION INTRAMUSCULAR; INTRAVENOUS at 19:00

## 2019-03-02 RX ADMIN — LISINOPRIL 40 MG: 20 TABLET ORAL at 11:05

## 2019-03-02 RX ADMIN — ENOXAPARIN SODIUM 30 MG: 30 INJECTION SUBCUTANEOUS at 11:05

## 2019-03-02 RX ADMIN — HYDROCODONE BITARTRATE AND ACETAMINOPHEN 1 TABLET: 5; 325 TABLET ORAL at 15:34

## 2019-03-02 RX ADMIN — ASPIRIN 81 MG 81 MG: 81 TABLET ORAL at 11:05

## 2019-03-02 RX ADMIN — INFLUENZA A VIRUS A/MICHIGAN/45/2015 X-275 (H1N1) ANTIGEN (FORMALDEHYDE INACTIVATED), INFLUENZA A VIRUS A/SINGAPORE/INFIMH-16-0019/2016 IVR-186 (H3N2) ANTIGEN (FORMALDEHYDE INACTIVATED), INFLUENZA B VIRUS B/PHUKET/3073/2013 ANTIGEN (FORMALDEHYDE INACTIVATED), AND INFLUENZA B VIRUS B/MARYLAND/15/2016 BX-69A ANTIGEN (FORMALDEHYDE INACTIVATED) 0.5 ML: 15; 15; 15; 15 INJECTION, SUSPENSION INTRAMUSCULAR at 11:05

## 2019-03-02 RX ADMIN — HYDROCODONE BITARTRATE AND ACETAMINOPHEN 1 TABLET: 5; 325 TABLET ORAL at 02:52

## 2019-03-02 RX ADMIN — LEVETIRACETAM 500 MG: 500 TABLET, FILM COATED ORAL at 11:04

## 2019-03-02 RX ADMIN — HYDROCHLOROTHIAZIDE 25 MG: 25 TABLET ORAL at 11:04

## 2019-03-02 RX ADMIN — PRAVASTATIN SODIUM 40 MG: 40 TABLET ORAL at 19:00

## 2019-03-02 RX ADMIN — CLOPIDOGREL BISULFATE 75 MG: 75 TABLET ORAL at 11:04

## 2019-03-02 RX ADMIN — PANTOPRAZOLE SODIUM 40 MG: 40 TABLET, DELAYED RELEASE ORAL at 06:55

## 2019-03-02 RX ADMIN — Medication 10 ML: at 21:08

## 2019-03-02 RX ADMIN — Medication 2 MG: at 21:08

## 2019-03-02 ASSESSMENT — PAIN DESCRIPTION - PAIN TYPE: TYPE: CHRONIC PAIN

## 2019-03-02 ASSESSMENT — PAIN SCALES - GENERAL
PAINLEVEL_OUTOF10: 10
PAINLEVEL_OUTOF10: 9
PAINLEVEL_OUTOF10: 8
PAINLEVEL_OUTOF10: 3

## 2019-03-02 ASSESSMENT — PAIN DESCRIPTION - LOCATION: LOCATION: BACK

## 2019-03-03 VITALS
RESPIRATION RATE: 16 BRPM | HEART RATE: 68 BPM | OXYGEN SATURATION: 97 % | SYSTOLIC BLOOD PRESSURE: 100 MMHG | WEIGHT: 138.89 LBS | TEMPERATURE: 97.6 F | HEIGHT: 57 IN | DIASTOLIC BLOOD PRESSURE: 60 MMHG | BODY MASS INDEX: 29.96 KG/M2

## 2019-03-03 LAB
ANION GAP SERPL CALCULATED.3IONS-SCNC: 17 MMOL/L (ref 3–16)
BASOPHILS ABSOLUTE: 0 K/UL (ref 0–0.2)
BASOPHILS RELATIVE PERCENT: 0.5 %
BUN BLDV-MCNC: 46 MG/DL (ref 7–20)
CALCIUM SERPL-MCNC: 9.4 MG/DL (ref 8.3–10.6)
CHLORIDE BLD-SCNC: 103 MMOL/L (ref 99–110)
CO2: 19 MMOL/L (ref 21–32)
CREAT SERPL-MCNC: 1.3 MG/DL (ref 0.6–1.2)
EOSINOPHILS ABSOLUTE: 0.2 K/UL (ref 0–0.6)
EOSINOPHILS RELATIVE PERCENT: 2.4 %
GFR AFRICAN AMERICAN: 47
GFR NON-AFRICAN AMERICAN: 39
GLUCOSE BLD-MCNC: 102 MG/DL (ref 70–99)
HCT VFR BLD CALC: 43.1 % (ref 36–48)
HEMOGLOBIN: 14.3 G/DL (ref 12–16)
LYMPHOCYTES ABSOLUTE: 2.4 K/UL (ref 1–5.1)
LYMPHOCYTES RELATIVE PERCENT: 26.5 %
MCH RBC QN AUTO: 31.9 PG (ref 26–34)
MCHC RBC AUTO-ENTMCNC: 33.2 G/DL (ref 31–36)
MCV RBC AUTO: 96.1 FL (ref 80–100)
MONOCYTES ABSOLUTE: 1 K/UL (ref 0–1.3)
MONOCYTES RELATIVE PERCENT: 11.5 %
NEUTROPHILS ABSOLUTE: 5.3 K/UL (ref 1.7–7.7)
NEUTROPHILS RELATIVE PERCENT: 59.1 %
PDW BLD-RTO: 13.6 % (ref 12.4–15.4)
PLATELET # BLD: 230 K/UL (ref 135–450)
PMV BLD AUTO: 9 FL (ref 5–10.5)
POTASSIUM SERPL-SCNC: 4.7 MMOL/L (ref 3.5–5.1)
RBC # BLD: 4.48 M/UL (ref 4–5.2)
SODIUM BLD-SCNC: 139 MMOL/L (ref 136–145)
WBC # BLD: 9 K/UL (ref 4–11)

## 2019-03-03 PROCEDURE — 6370000000 HC RX 637 (ALT 250 FOR IP): Performed by: INTERNAL MEDICINE

## 2019-03-03 PROCEDURE — 94760 N-INVAS EAR/PLS OXIMETRY 1: CPT

## 2019-03-03 PROCEDURE — 80048 BASIC METABOLIC PNL TOTAL CA: CPT

## 2019-03-03 PROCEDURE — 36415 COLL VENOUS BLD VENIPUNCTURE: CPT

## 2019-03-03 PROCEDURE — 85025 COMPLETE CBC W/AUTO DIFF WBC: CPT

## 2019-03-03 PROCEDURE — 6370000000 HC RX 637 (ALT 250 FOR IP): Performed by: NURSE PRACTITIONER

## 2019-03-03 PROCEDURE — 6360000002 HC RX W HCPCS: Performed by: INTERNAL MEDICINE

## 2019-03-03 RX ORDER — HYDROCODONE BITARTRATE AND ACETAMINOPHEN 5; 325 MG/1; MG/1
1 TABLET ORAL EVERY 8 HOURS PRN
Qty: 10 TABLET | Refills: 0 | Status: SHIPPED | OUTPATIENT
Start: 2019-03-03 | End: 2019-03-06

## 2019-03-03 RX ORDER — ASPIRIN 81 MG/1
81 TABLET, CHEWABLE ORAL DAILY
Qty: 30 TABLET | Refills: 3 | Status: SHIPPED | OUTPATIENT
Start: 2019-03-04

## 2019-03-03 RX ORDER — LEVETIRACETAM 500 MG/1
500 TABLET ORAL 2 TIMES DAILY
Qty: 60 TABLET | Refills: 0 | Status: SHIPPED | OUTPATIENT
Start: 2019-03-03 | End: 2019-05-16 | Stop reason: ALTCHOICE

## 2019-03-03 RX ADMIN — PANTOPRAZOLE SODIUM 40 MG: 40 TABLET, DELAYED RELEASE ORAL at 07:18

## 2019-03-03 RX ADMIN — LISINOPRIL 40 MG: 20 TABLET ORAL at 09:34

## 2019-03-03 RX ADMIN — LEVETIRACETAM 500 MG: 500 TABLET, FILM COATED ORAL at 09:34

## 2019-03-03 RX ADMIN — ENOXAPARIN SODIUM 30 MG: 30 INJECTION SUBCUTANEOUS at 09:34

## 2019-03-03 RX ADMIN — HYDROCODONE BITARTRATE AND ACETAMINOPHEN 1 TABLET: 5; 325 TABLET ORAL at 02:02

## 2019-03-03 RX ADMIN — HYDROCODONE BITARTRATE AND ACETAMINOPHEN 1 TABLET: 5; 325 TABLET ORAL at 10:11

## 2019-03-03 RX ADMIN — HYDROCHLOROTHIAZIDE 25 MG: 25 TABLET ORAL at 09:34

## 2019-03-03 RX ADMIN — ASPIRIN 81 MG 81 MG: 81 TABLET ORAL at 09:34

## 2019-03-03 RX ADMIN — CLOPIDOGREL BISULFATE 75 MG: 75 TABLET ORAL at 09:34

## 2019-03-03 ASSESSMENT — PAIN SCALES - GENERAL
PAINLEVEL_OUTOF10: 10
PAINLEVEL_OUTOF10: 8
PAINLEVEL_OUTOF10: 8
PAINLEVEL_OUTOF10: 4

## 2019-03-31 ENCOUNTER — APPOINTMENT (OUTPATIENT)
Dept: GENERAL RADIOLOGY | Age: 84
DRG: 438 | End: 2019-03-31
Payer: MEDICARE

## 2019-03-31 ENCOUNTER — APPOINTMENT (OUTPATIENT)
Dept: CT IMAGING | Age: 84
DRG: 438 | End: 2019-03-31
Payer: MEDICARE

## 2019-03-31 ENCOUNTER — HOSPITAL ENCOUNTER (INPATIENT)
Age: 84
LOS: 4 days | Discharge: HOME HEALTH | DRG: 438 | End: 2019-04-04
Attending: EMERGENCY MEDICINE | Admitting: HOSPITALIST
Payer: MEDICARE

## 2019-03-31 DIAGNOSIS — J01.00 ACUTE MAXILLARY SINUSITIS, RECURRENCE NOT SPECIFIED: ICD-10-CM

## 2019-03-31 DIAGNOSIS — R74.8 ABNORMAL SERUM LEVEL OF LIPASE: ICD-10-CM

## 2019-03-31 DIAGNOSIS — R41.0 CONFUSION: ICD-10-CM

## 2019-03-31 DIAGNOSIS — D72.829 LEUKOCYTOSIS, UNSPECIFIED TYPE: ICD-10-CM

## 2019-03-31 DIAGNOSIS — R09.02 HYPOXIA: ICD-10-CM

## 2019-03-31 DIAGNOSIS — M54.9 BACK PAIN, UNSPECIFIED BACK LOCATION, UNSPECIFIED BACK PAIN LATERALITY, UNSPECIFIED CHRONICITY: ICD-10-CM

## 2019-03-31 DIAGNOSIS — R93.89 ABNORMAL CXR: Primary | ICD-10-CM

## 2019-03-31 PROBLEM — R41.82 ALTERED MENTAL STATUS: Status: ACTIVE | Noted: 2019-03-31

## 2019-03-31 LAB
A/G RATIO: 1.1 (ref 1.1–2.2)
ALBUMIN SERPL-MCNC: 4 G/DL (ref 3.4–5)
ALP BLD-CCNC: 77 U/L (ref 40–129)
ALT SERPL-CCNC: 15 U/L (ref 10–40)
AMMONIA: 12 UMOL/L (ref 11–51)
ANION GAP SERPL CALCULATED.3IONS-SCNC: 16 MMOL/L (ref 3–16)
AST SERPL-CCNC: 21 U/L (ref 15–37)
BASOPHILS ABSOLUTE: 0 K/UL (ref 0–0.2)
BASOPHILS RELATIVE PERCENT: 0.2 %
BILIRUB SERPL-MCNC: 0.4 MG/DL (ref 0–1)
BILIRUBIN URINE: NEGATIVE
BLOOD, URINE: NEGATIVE
BUN BLDV-MCNC: 70 MG/DL (ref 7–20)
CALCIUM SERPL-MCNC: 10 MG/DL (ref 8.3–10.6)
CHLORIDE BLD-SCNC: 96 MMOL/L (ref 99–110)
CLARITY: CLEAR
CO2: 24 MMOL/L (ref 21–32)
COLOR: YELLOW
CREAT SERPL-MCNC: 1.7 MG/DL (ref 0.6–1.2)
EOSINOPHILS ABSOLUTE: 0 K/UL (ref 0–0.6)
EOSINOPHILS RELATIVE PERCENT: 0 %
GFR AFRICAN AMERICAN: 34
GFR NON-AFRICAN AMERICAN: 28
GLOBULIN: 3.8 G/DL
GLUCOSE BLD-MCNC: 113 MG/DL (ref 70–99)
GLUCOSE BLD-MCNC: 148 MG/DL (ref 70–99)
GLUCOSE URINE: NEGATIVE MG/DL
HCT VFR BLD CALC: 48.9 % (ref 36–48)
HEMOGLOBIN: 15.8 G/DL (ref 12–16)
KEPPRA DOSE AMT: ABNORMAL
KEPPRA: <2 UG/ML (ref 6–46)
KETONES, URINE: NEGATIVE MG/DL
LACTIC ACID: 1.9 MMOL/L (ref 0.4–2)
LEUKOCYTE ESTERASE, URINE: NEGATIVE
LIPASE: 496 U/L (ref 13–60)
LYMPHOCYTES ABSOLUTE: 1.2 K/UL (ref 1–5.1)
LYMPHOCYTES RELATIVE PERCENT: 5.7 %
MCH RBC QN AUTO: 30.8 PG (ref 26–34)
MCHC RBC AUTO-ENTMCNC: 32.3 G/DL (ref 31–36)
MCV RBC AUTO: 95.2 FL (ref 80–100)
MICROSCOPIC EXAMINATION: NORMAL
MONOCYTES ABSOLUTE: 1.3 K/UL (ref 0–1.3)
MONOCYTES RELATIVE PERCENT: 6 %
NEUTROPHILS ABSOLUTE: 18.8 K/UL (ref 1.7–7.7)
NEUTROPHILS RELATIVE PERCENT: 88.1 %
NITRITE, URINE: NEGATIVE
PDW BLD-RTO: 13.5 % (ref 12.4–15.4)
PERFORMED ON: ABNORMAL
PH UA: 5 (ref 5–8)
PLATELET # BLD: 231 K/UL (ref 135–450)
PMV BLD AUTO: 9 FL (ref 5–10.5)
POTASSIUM REFLEX MAGNESIUM: 4.9 MMOL/L (ref 3.5–5.1)
PROTEIN UA: NEGATIVE MG/DL
RBC # BLD: 5.14 M/UL (ref 4–5.2)
SODIUM BLD-SCNC: 136 MMOL/L (ref 136–145)
SPECIFIC GRAVITY UA: 1.02 (ref 1–1.03)
TOTAL PROTEIN: 7.8 G/DL (ref 6.4–8.2)
TROPONIN: <0.01 NG/ML
URINE REFLEX TO CULTURE: NORMAL
URINE TYPE: NORMAL
UROBILINOGEN, URINE: 0.2 E.U./DL
WBC # BLD: 21.4 K/UL (ref 4–11)

## 2019-03-31 PROCEDURE — 85025 COMPLETE CBC W/AUTO DIFF WBC: CPT

## 2019-03-31 PROCEDURE — 84484 ASSAY OF TROPONIN QUANT: CPT

## 2019-03-31 PROCEDURE — 99285 EMERGENCY DEPT VISIT HI MDM: CPT

## 2019-03-31 PROCEDURE — 93005 ELECTROCARDIOGRAM TRACING: CPT | Performed by: EMERGENCY MEDICINE

## 2019-03-31 PROCEDURE — 83605 ASSAY OF LACTIC ACID: CPT

## 2019-03-31 PROCEDURE — 82140 ASSAY OF AMMONIA: CPT

## 2019-03-31 PROCEDURE — 80177 DRUG SCRN QUAN LEVETIRACETAM: CPT

## 2019-03-31 PROCEDURE — 80053 COMPREHEN METABOLIC PANEL: CPT

## 2019-03-31 PROCEDURE — 74176 CT ABD & PELVIS W/O CONTRAST: CPT

## 2019-03-31 PROCEDURE — 71045 X-RAY EXAM CHEST 1 VIEW: CPT

## 2019-03-31 PROCEDURE — 70450 CT HEAD/BRAIN W/O DYE: CPT

## 2019-03-31 PROCEDURE — 87040 BLOOD CULTURE FOR BACTERIA: CPT

## 2019-03-31 PROCEDURE — 2060000000 HC ICU INTERMEDIATE R&B

## 2019-03-31 PROCEDURE — 81003 URINALYSIS AUTO W/O SCOPE: CPT

## 2019-03-31 PROCEDURE — 83690 ASSAY OF LIPASE: CPT

## 2019-03-31 RX ORDER — AMOXICILLIN AND CLAVULANATE POTASSIUM 875; 125 MG/1; MG/1
1 TABLET, FILM COATED ORAL 2 TIMES DAILY
Status: ON HOLD | COMMUNITY
Start: 2019-03-29 | End: 2019-04-04 | Stop reason: HOSPADM

## 2019-03-31 RX ORDER — CHLORAL HYDRATE 500 MG
1000 CAPSULE ORAL DAILY
Status: CANCELLED | OUTPATIENT
Start: 2019-04-01

## 2019-03-31 RX ORDER — ACETAMINOPHEN 325 MG/1
650 TABLET ORAL EVERY 4 HOURS PRN
Status: DISCONTINUED | OUTPATIENT
Start: 2019-03-31 | End: 2019-04-01

## 2019-03-31 RX ORDER — VENLAFAXINE HYDROCHLORIDE 75 MG/1
75 CAPSULE, EXTENDED RELEASE ORAL DAILY
Status: CANCELLED | OUTPATIENT
Start: 2019-04-01

## 2019-03-31 RX ORDER — HYDROCODONE BITARTRATE AND ACETAMINOPHEN 7.5; 325 MG/1; MG/1
1 TABLET ORAL EVERY 6 HOURS PRN
Status: ON HOLD | COMMUNITY
End: 2019-04-04 | Stop reason: SDUPTHER

## 2019-03-31 RX ORDER — POLYETHYLENE GLYCOL 3350, SODIUM CHLORIDE, SODIUM BICARBONATE, POTASSIUM CHLORIDE 420; 11.2; 5.72; 1.48 G/4L; G/4L; G/4L; G/4L
4000 POWDER, FOR SOLUTION ORAL ONCE
COMMUNITY
End: 2019-05-16

## 2019-03-31 RX ORDER — LEVETIRACETAM 500 MG/1
500 TABLET ORAL 2 TIMES DAILY
Status: CANCELLED | OUTPATIENT
Start: 2019-03-31

## 2019-03-31 RX ORDER — SOTALOL HYDROCHLORIDE 80 MG/1
40 TABLET ORAL DAILY
COMMUNITY

## 2019-03-31 RX ORDER — TIZANIDINE 2 MG/1
2 TABLET ORAL EVERY 8 HOURS PRN
COMMUNITY

## 2019-03-31 RX ORDER — PANTOPRAZOLE SODIUM 40 MG/1
40 TABLET, DELAYED RELEASE ORAL
Status: CANCELLED | OUTPATIENT
Start: 2019-04-01

## 2019-03-31 RX ORDER — PRAVASTATIN SODIUM 10 MG
40 TABLET ORAL EVERY EVENING
Status: CANCELLED | OUTPATIENT
Start: 2019-03-31

## 2019-03-31 RX ORDER — HYDROCODONE BITARTRATE AND ACETAMINOPHEN 7.5; 325 MG/1; MG/1
1 TABLET ORAL EVERY 12 HOURS PRN
Status: CANCELLED | OUTPATIENT
Start: 2019-03-31

## 2019-03-31 ASSESSMENT — ENCOUNTER SYMPTOMS
SHORTNESS OF BREATH: 1
RHINORRHEA: 0
COUGH: 1
ABDOMINAL PAIN: 0
EYE REDNESS: 0
VOMITING: 0
DIARRHEA: 0
SORE THROAT: 0
NAUSEA: 1

## 2019-04-01 ENCOUNTER — APPOINTMENT (OUTPATIENT)
Dept: ULTRASOUND IMAGING | Age: 84
DRG: 438 | End: 2019-04-01
Payer: MEDICARE

## 2019-04-01 PROBLEM — E44.0 MODERATE MALNUTRITION (HCC): Chronic | Status: ACTIVE | Noted: 2019-04-01

## 2019-04-01 LAB
A/G RATIO: 0.9 (ref 1.1–2.2)
ALBUMIN SERPL-MCNC: 3.1 G/DL (ref 3.4–5)
ALP BLD-CCNC: 63 U/L (ref 40–129)
ALT SERPL-CCNC: 13 U/L (ref 10–40)
ANION GAP SERPL CALCULATED.3IONS-SCNC: 13 MMOL/L (ref 3–16)
AST SERPL-CCNC: 19 U/L (ref 15–37)
BASOPHILS ABSOLUTE: 0 K/UL (ref 0–0.2)
BASOPHILS RELATIVE PERCENT: 0.2 %
BILIRUB SERPL-MCNC: 0.5 MG/DL (ref 0–1)
BUN BLDV-MCNC: 66 MG/DL (ref 7–20)
CALCIUM SERPL-MCNC: 9.4 MG/DL (ref 8.3–10.6)
CHLORIDE BLD-SCNC: 100 MMOL/L (ref 99–110)
CO2: 22 MMOL/L (ref 21–32)
CREAT SERPL-MCNC: 1.6 MG/DL (ref 0.6–1.2)
EOSINOPHILS ABSOLUTE: 0 K/UL (ref 0–0.6)
EOSINOPHILS RELATIVE PERCENT: 0 %
GFR AFRICAN AMERICAN: 37
GFR NON-AFRICAN AMERICAN: 30
GLOBULIN: 3.4 G/DL
GLUCOSE BLD-MCNC: 90 MG/DL (ref 70–99)
HCT VFR BLD CALC: 43.3 % (ref 36–48)
HEMOGLOBIN: 14.1 G/DL (ref 12–16)
LACTIC ACID: 1.7 MMOL/L (ref 0.4–2)
LIPASE: 370 U/L (ref 13–60)
LYMPHOCYTES ABSOLUTE: 1.7 K/UL (ref 1–5.1)
LYMPHOCYTES RELATIVE PERCENT: 8.1 %
MCH RBC QN AUTO: 31 PG (ref 26–34)
MCHC RBC AUTO-ENTMCNC: 32.5 G/DL (ref 31–36)
MCV RBC AUTO: 95.2 FL (ref 80–100)
MONOCYTES ABSOLUTE: 1.4 K/UL (ref 0–1.3)
MONOCYTES RELATIVE PERCENT: 6.3 %
NEUTROPHILS ABSOLUTE: 18.2 K/UL (ref 1.7–7.7)
NEUTROPHILS RELATIVE PERCENT: 85.4 %
PDW BLD-RTO: 13.4 % (ref 12.4–15.4)
PLATELET # BLD: 205 K/UL (ref 135–450)
PMV BLD AUTO: 9 FL (ref 5–10.5)
POTASSIUM REFLEX MAGNESIUM: 4.7 MMOL/L (ref 3.5–5.1)
RBC # BLD: 4.54 M/UL (ref 4–5.2)
SODIUM BLD-SCNC: 135 MMOL/L (ref 136–145)
TOTAL PROTEIN: 6.5 G/DL (ref 6.4–8.2)
TRIGL SERPL-MCNC: 78 MG/DL (ref 0–150)
WBC # BLD: 21.4 K/UL (ref 4–11)

## 2019-04-01 PROCEDURE — 85025 COMPLETE CBC W/AUTO DIFF WBC: CPT

## 2019-04-01 PROCEDURE — 83690 ASSAY OF LIPASE: CPT

## 2019-04-01 PROCEDURE — 6370000000 HC RX 637 (ALT 250 FOR IP): Performed by: HOSPITALIST

## 2019-04-01 PROCEDURE — 80053 COMPREHEN METABOLIC PANEL: CPT

## 2019-04-01 PROCEDURE — 1200000000 HC SEMI PRIVATE

## 2019-04-01 PROCEDURE — 36415 COLL VENOUS BLD VENIPUNCTURE: CPT

## 2019-04-01 PROCEDURE — 6360000002 HC RX W HCPCS: Performed by: PHYSICIAN ASSISTANT

## 2019-04-01 PROCEDURE — 76705 ECHO EXAM OF ABDOMEN: CPT

## 2019-04-01 PROCEDURE — 93010 ELECTROCARDIOGRAM REPORT: CPT | Performed by: INTERNAL MEDICINE

## 2019-04-01 PROCEDURE — 6370000000 HC RX 637 (ALT 250 FOR IP): Performed by: INTERNAL MEDICINE

## 2019-04-01 PROCEDURE — C9113 INJ PANTOPRAZOLE SODIUM, VIA: HCPCS | Performed by: PHYSICIAN ASSISTANT

## 2019-04-01 PROCEDURE — 2580000003 HC RX 258: Performed by: PHYSICIAN ASSISTANT

## 2019-04-01 PROCEDURE — 84478 ASSAY OF TRIGLYCERIDES: CPT

## 2019-04-01 PROCEDURE — 94760 N-INVAS EAR/PLS OXIMETRY 1: CPT

## 2019-04-01 RX ORDER — ASPIRIN 81 MG/1
81 TABLET, CHEWABLE ORAL DAILY
Status: DISCONTINUED | OUTPATIENT
Start: 2019-04-01 | End: 2019-04-01

## 2019-04-01 RX ORDER — ACETAMINOPHEN 650 MG/1
650 SUPPOSITORY RECTAL EVERY 4 HOURS PRN
Status: DISCONTINUED | OUTPATIENT
Start: 2019-04-01 | End: 2019-04-04 | Stop reason: HOSPADM

## 2019-04-01 RX ORDER — SODIUM CHLORIDE 0.9 % (FLUSH) 0.9 %
10 SYRINGE (ML) INJECTION EVERY 12 HOURS SCHEDULED
Status: DISCONTINUED | OUTPATIENT
Start: 2019-04-01 | End: 2019-04-04 | Stop reason: HOSPADM

## 2019-04-01 RX ORDER — ONDANSETRON 2 MG/ML
4 INJECTION INTRAMUSCULAR; INTRAVENOUS EVERY 6 HOURS PRN
Status: DISCONTINUED | OUTPATIENT
Start: 2019-04-01 | End: 2019-04-04 | Stop reason: HOSPADM

## 2019-04-01 RX ORDER — BISACODYL 10 MG
10 SUPPOSITORY, RECTAL RECTAL DAILY PRN
Status: DISCONTINUED | OUTPATIENT
Start: 2019-04-01 | End: 2019-04-04 | Stop reason: HOSPADM

## 2019-04-01 RX ORDER — CLOPIDOGREL BISULFATE 75 MG/1
75 TABLET ORAL DAILY
Status: DISCONTINUED | OUTPATIENT
Start: 2019-04-01 | End: 2019-04-01

## 2019-04-01 RX ORDER — DOCUSATE SODIUM 100 MG/1
100 CAPSULE, LIQUID FILLED ORAL 2 TIMES DAILY PRN
Status: DISCONTINUED | OUTPATIENT
Start: 2019-04-01 | End: 2019-04-01

## 2019-04-01 RX ORDER — LABETALOL HYDROCHLORIDE 5 MG/ML
10 INJECTION, SOLUTION INTRAVENOUS EVERY 6 HOURS PRN
Status: DISCONTINUED | OUTPATIENT
Start: 2019-04-01 | End: 2019-04-04 | Stop reason: HOSPADM

## 2019-04-01 RX ORDER — HEPARIN SODIUM 5000 [USP'U]/ML
5000 INJECTION, SOLUTION INTRAVENOUS; SUBCUTANEOUS EVERY 8 HOURS SCHEDULED
Status: DISCONTINUED | OUTPATIENT
Start: 2019-04-01 | End: 2019-04-04 | Stop reason: HOSPADM

## 2019-04-01 RX ORDER — SODIUM CHLORIDE, SODIUM LACTATE, POTASSIUM CHLORIDE, CALCIUM CHLORIDE 600; 310; 30; 20 MG/100ML; MG/100ML; MG/100ML; MG/100ML
INJECTION, SOLUTION INTRAVENOUS CONTINUOUS
Status: ACTIVE | OUTPATIENT
Start: 2019-04-01 | End: 2019-04-01

## 2019-04-01 RX ORDER — SODIUM CHLORIDE, SODIUM LACTATE, POTASSIUM CHLORIDE, CALCIUM CHLORIDE 600; 310; 30; 20 MG/100ML; MG/100ML; MG/100ML; MG/100ML
INJECTION, SOLUTION INTRAVENOUS CONTINUOUS
Status: DISCONTINUED | OUTPATIENT
Start: 2019-04-01 | End: 2019-04-04 | Stop reason: HOSPADM

## 2019-04-01 RX ORDER — CIPROFLOXACIN 2 MG/ML
400 INJECTION, SOLUTION INTRAVENOUS EVERY 24 HOURS
Status: DISCONTINUED | OUTPATIENT
Start: 2019-04-01 | End: 2019-04-03

## 2019-04-01 RX ORDER — CLOPIDOGREL BISULFATE 75 MG/1
75 TABLET ORAL DAILY
Status: DISCONTINUED | OUTPATIENT
Start: 2019-04-01 | End: 2019-04-04 | Stop reason: HOSPADM

## 2019-04-01 RX ORDER — PANTOPRAZOLE SODIUM 40 MG/10ML
40 INJECTION, POWDER, LYOPHILIZED, FOR SOLUTION INTRAVENOUS ONCE
Status: DISCONTINUED | OUTPATIENT
Start: 2019-04-01 | End: 2019-04-01

## 2019-04-01 RX ORDER — ASPIRIN 81 MG/1
81 TABLET, CHEWABLE ORAL DAILY
Status: DISCONTINUED | OUTPATIENT
Start: 2019-04-01 | End: 2019-04-04 | Stop reason: HOSPADM

## 2019-04-01 RX ORDER — PANTOPRAZOLE SODIUM 40 MG/10ML
40 INJECTION, POWDER, LYOPHILIZED, FOR SOLUTION INTRAVENOUS
Status: DISCONTINUED | OUTPATIENT
Start: 2019-04-01 | End: 2019-04-04

## 2019-04-01 RX ORDER — DOCUSATE SODIUM 100 MG/1
100 CAPSULE, LIQUID FILLED ORAL 2 TIMES DAILY
Status: DISCONTINUED | OUTPATIENT
Start: 2019-04-01 | End: 2019-04-01

## 2019-04-01 RX ORDER — MORPHINE SULFATE 2 MG/ML
2 INJECTION, SOLUTION INTRAMUSCULAR; INTRAVENOUS
Status: DISCONTINUED | OUTPATIENT
Start: 2019-04-01 | End: 2019-04-04 | Stop reason: HOSPADM

## 2019-04-01 RX ORDER — SODIUM CHLORIDE 0.9 % (FLUSH) 0.9 %
10 SYRINGE (ML) INJECTION PRN
Status: DISCONTINUED | OUTPATIENT
Start: 2019-04-01 | End: 2019-04-04 | Stop reason: HOSPADM

## 2019-04-01 RX ORDER — LEVETIRACETAM 5 MG/ML
500 INJECTION INTRAVASCULAR EVERY 12 HOURS
Status: DISCONTINUED | OUTPATIENT
Start: 2019-04-01 | End: 2019-04-04 | Stop reason: HOSPADM

## 2019-04-01 RX ADMIN — CLOPIDOGREL BISULFATE 75 MG: 75 TABLET ORAL at 17:00

## 2019-04-01 RX ADMIN — SODIUM CHLORIDE, POTASSIUM CHLORIDE, SODIUM LACTATE AND CALCIUM CHLORIDE: 600; 310; 30; 20 INJECTION, SOLUTION INTRAVENOUS at 01:58

## 2019-04-01 RX ADMIN — HEPARIN SODIUM 5000 UNITS: 5000 INJECTION INTRAVENOUS; SUBCUTANEOUS at 13:34

## 2019-04-01 RX ADMIN — SODIUM CHLORIDE, POTASSIUM CHLORIDE, SODIUM LACTATE AND CALCIUM CHLORIDE: 600; 310; 30; 20 INJECTION, SOLUTION INTRAVENOUS at 06:51

## 2019-04-01 RX ADMIN — HEPARIN SODIUM 5000 UNITS: 5000 INJECTION INTRAVENOUS; SUBCUTANEOUS at 21:54

## 2019-04-01 RX ADMIN — PANTOPRAZOLE SODIUM 40 MG: 40 INJECTION, POWDER, FOR SOLUTION INTRAVENOUS at 06:00

## 2019-04-01 RX ADMIN — LEVETIRACETAM 500 MG: 5 INJECTION INTRAVENOUS at 21:54

## 2019-04-01 RX ADMIN — SODIUM CHLORIDE, POTASSIUM CHLORIDE, SODIUM LACTATE AND CALCIUM CHLORIDE: 600; 310; 30; 20 INJECTION, SOLUTION INTRAVENOUS at 09:15

## 2019-04-01 RX ADMIN — CIPROFLOXACIN 400 MG: 2 INJECTION, SOLUTION INTRAVENOUS at 01:58

## 2019-04-01 RX ADMIN — ASPIRIN 81 MG 81 MG: 81 TABLET ORAL at 17:00

## 2019-04-01 RX ADMIN — ACETAMINOPHEN 650 MG: 325 TABLET, FILM COATED ORAL at 00:15

## 2019-04-01 RX ADMIN — HEPARIN SODIUM 5000 UNITS: 5000 INJECTION INTRAVENOUS; SUBCUTANEOUS at 01:58

## 2019-04-01 RX ADMIN — LEVETIRACETAM 500 MG: 5 INJECTION INTRAVENOUS at 09:41

## 2019-04-01 RX ADMIN — HEPARIN SODIUM 5000 UNITS: 5000 INJECTION INTRAVENOUS; SUBCUTANEOUS at 06:06

## 2019-04-01 RX ADMIN — MORPHINE SULFATE 2 MG: 2 INJECTION, SOLUTION INTRAMUSCULAR; INTRAVENOUS at 05:58

## 2019-04-01 ASSESSMENT — PAIN DESCRIPTION - ORIENTATION: ORIENTATION: OTHER (COMMENT)

## 2019-04-01 ASSESSMENT — PAIN SCALES - GENERAL
PAINLEVEL_OUTOF10: 0
PAINLEVEL_OUTOF10: 8

## 2019-04-01 ASSESSMENT — PAIN DESCRIPTION - LOCATION: LOCATION: GENERALIZED

## 2019-04-01 ASSESSMENT — PAIN DESCRIPTION - DIRECTION: RADIATING_TOWARDS: NO RADIATION

## 2019-04-01 ASSESSMENT — PAIN DESCRIPTION - FREQUENCY: FREQUENCY: CONTINUOUS

## 2019-04-01 ASSESSMENT — PAIN DESCRIPTION - DESCRIPTORS: DESCRIPTORS: ACHING;DISCOMFORT

## 2019-04-01 ASSESSMENT — PAIN DESCRIPTION - PAIN TYPE: TYPE: CHRONIC PAIN

## 2019-04-01 ASSESSMENT — PAIN DESCRIPTION - ONSET: ONSET: UNABLE TO TELL

## 2019-04-01 ASSESSMENT — PAIN DESCRIPTION - PROGRESSION: CLINICAL_PROGRESSION: GRADUALLY WORSENING

## 2019-04-01 ASSESSMENT — PAIN - FUNCTIONAL ASSESSMENT: PAIN_FUNCTIONAL_ASSESSMENT: PREVENTS OR INTERFERES WITH MANY ACTIVE NOT PASSIVE ACTIVITIES

## 2019-04-01 NOTE — ED TRIAGE NOTES
Pt A&O to ED with c/o weakness x 2 weeks, occ. altered mental status x 1 month, pt has been fighting a UTI x 1 month, today pt was at home confused and stated that she didn't feel well and she was dying. Pt arrived by EMS. Pt family state she was slow to respond. Pt is cold to the touch and fingers are cold and blue. EMS states that pt was 86 on RA and placed on a non-rebreather. Pt is in afib which is not new. Pt BG was 113 with EMS. Pt is following commands and is 100% on 4 L/min. Pt states at home that she had some lower abd pain that was cramping but that she is no longer having any pain.

## 2019-04-01 NOTE — ED NOTES
Handoff given to [facility] to assume care.       Niru Larsen RN  03/31/19 1270

## 2019-04-01 NOTE — ED NOTES
Patient and family stating she takes hydrocodone for pain. Updated med list and paged NP. Tylenol held at this time. Report called to floor EDILBERTO.      Kathy Hampton RN  04/01/19 5308

## 2019-04-01 NOTE — PLAN OF CARE
Problem: Falls - Risk of:  Goal: Will remain free from falls  Description  Will remain free from falls  Outcome: Ongoing   Room is close to nurses station, hourly rounding continue per staff      Problem: Safety:  Goal: Ability to remain free from injury will improve  Description  Ability to remain free from injury will improve  Outcome: Ongoing  Safety measures are in place    Problem: Infection:  Goal: Will remain free from infection  Description  Will remain free from infection  Outcome: Ongoing   Tolerating antibiotics,Will continue to monitor.

## 2019-04-01 NOTE — CONSULTS
GASTROENTEROLOGY INPATIENT CONSULTATION      IDENTIFYING DATA/REASON FOR CONSULTATION   PATIENT:  Veronika Lopez  MRN:  [id number]  ADMIT DATE: 3/31/2019  TIME OF EVALUATION: 4/1/2019 7:54 AM  HOSPITAL STAY:   LOS: 1 day     REASON FOR CONSULTATION:  Pancreatitis    HISTORY OF PRESENT ILLNESS   Veronika Lopez is a 80 y.o. female with a PMH of Afib, s/p Watchman, CHF, HTN, HLD,  who presented on 3/31/2019 with AMS. We have been consulted regarding pancreatitis. Pt provides limited history. Additional information obtained from chart review. She was recently diagnosed with E Coli UTI as outpt by her PCP and started on Augmentin 3 days ago. Family noted persistent confusion and pt reported feeling very poorly therefore EMS was called and she was brought to the ER. Work up in the ER noted leukocytosis, elevated lipase of 496, and SHANTEL. CT chest/abd/pel showed mid acute pancreatitis at the pancreatic head. CT head showed no intracranial abnormalities. Pt states she's been suffering from nausea and abdominal discomfort for past month. There is no report of heavy ETOH use. LFTs normal and US neg for cholecystitis, cholelithiasis or choledocholithiasis. Calcium and TG level normal        PAST MEDICAL, SURGICAL, FAMILY, and SOCIAL HISTORY     Past Medical History:   Diagnosis Date    Atrial fibrillation (HCC)     Carpal tunnel syndrome, bilateral     Cataract     CHF (congestive heart failure) (HCC)     Hyperlipidemia     Hypertension     MI (myocardial infarction) ([facility] [address]      Past Surgical History:   Procedure Laterality Date    CARPAL TUNNEL RELEASE      CATARACT REMOVAL      EYE SURGERY      OTHER SURGICAL HISTORY  07/19/2017    Watchman procedure    SPINE SURGERY      TOTAL KNEE ARTHROPLASTY Bilateral      Family History   Problem Relation Age of Onset    Other Mother     Other Father      Social History     Socioeconomic History    Marital status:       Spouse name: None    Number of children: None    Years of education: None    Highest education level: None   Occupational History    None   Social Needs    Financial resource strain: None    Food insecurity:     Worry: None     Inability: None    Transportation needs:     Medical: None     Non-medical: None   Tobacco Use    Smoking status: Never Smoker    Smokeless tobacco: Never Used   Substance and Sexual Activity    Alcohol use: No    Drug use: No    Sexual activity: Not Currently   Lifestyle    Physical activity:     Days per week: None     Minutes per session: None    Stress: None   Relationships    Social connections:     Talks on phone: None     Gets together: None     Attends [employer]ism service: None     Active member of club or organization: None     Attends meetings of clubs or organizations: None     Relationship status: None    Intimate partner violence:     Fear of current or ex partner: None     Emotionally abused: None     Physically abused: None     Forced sexual activity: None   Other Topics Concern    None   Social History Narrative    None       MEDICATIONS   SCHEDULED:    sodium chloride flush 10 mL 2 times per day   heparin (porcine) 5,000 Units 3 times per day   levetiracetam 500 mg Q12H   pantoprazole 40 mg QAM AC   ciprofloxacin 400 mg Q24H     FLUIDS/DRIPS:     lactated ringers 250 mL/hr at 04/01/19 0651    Followed by   Roxanne Galvez lactated ringers       PRNs:   sodium chloride flush 10 mL PRN   ondansetron 4 mg Q6H PRN   bisacodyl 10 mg Daily PRN   morphine 2 mg Q3H PRN   acetaminophen 650 mg Q4H PRN   labetalol 10 mg Q6H PRN     ALLERGIES:  She Allergies   Allergen Reactions    Elena Advanced Aspirin Ex St [Aspirin] Other (See Comments)     Elena back and body  Off balance and deaf       REVIEW OF SYSTEMS   Pertinent ROS noted in HPI    PHYSICAL EXAM   [unfilled]   I/O last 3 completed shifts:   In: 1375.9 [I.V.:1175.9; IV Piggyback:200]  Out: 350 [Urine:350]      Physical Exam:  Gen: Resting in bed, NAD   CV: RRR no MRG   Pul: CTAB   Abd: soft, TTP mid abdomen    Ext: No edema   Neuro: alert, confused  Skin: No jaundice, spider angiomas, mathew erythema      LABS AND IMAGING     Recent Results (from the past 24 hour(s))   EKG 12 Lead    Collection Time: 03/31/19  8:50 PM   Result Value Ref Range    Ventricular Rate 97 BPM    Atrial Rate 98 BPM    QRS Duration 104 ms    Q-T Interval 368 ms    QTc Calculation (Bazett) 467 ms    R Axis -55 degrees    T Axis 96 degrees    Diagnosis       Atrial fibrillation with premature ventricular or aberrantly conducted complexesLeft axis deviationInferior infarct , age undeterminedAnterior infarct (cited on or before 31-MAR-2019)Abnormal ECGWhen compared with ECG of 25-FEB-2019 21:53,Nonspecific T wave abnormality no longer evident in Inferior leadsNonspecific T wave abnormality, improved in Lateral leadsQT has lengthened   Ammonia    Collection Time: 03/31/19  9:13 PM   Result Value Ref Range    Ammonia 12 11 - 51 umol/L   CBC Auto Differential    Collection Time: 03/31/19  9:13 PM   Result Value Ref Range    WBC 21.4 (H) 4.0 - 11.0 K/uL    RBC 5.14 4.00 - 5.20 M/uL    Hemoglobin 15.8 12.0 - 16.0 g/dL    Hematocrit 48.9 (H) 36.0 - 48.0 %    MCV 95.2 80.0 - 100.0 fL    MCH 30.8 26.0 - 34.0 pg    MCHC 32.3 31.0 - 36.0 g/dL    RDW 13.5 12.4 - 15.4 %    Platelets 576 892 - 033 K/uL    MPV 9.0 5.0 - 10.5 fL    Neutrophils % 88.1 %    Lymphocytes % 5.7 %    Monocytes % 6.0 %    Eosinophils % 0.0 %    Basophils % 0.2 %    Neutrophils # 18.8 (H) 1.7 - 7.7 K/uL    Lymphocytes # 1.2 1.0 - 5.1 K/uL    Monocytes # 1.3 0.0 - 1.3 K/uL    Eosinophils # 0.0 0.0 - 0.6 K/uL    Basophils # 0.0 0.0 - 0.2 K/uL   Comprehensive Metabolic Panel w/ Reflex to MG    Collection Time: 03/31/19  9:13 PM   Result Value Ref Range    Sodium 136 136 - 145 mmol/L    Potassium reflex Magnesium 4.9 3.5 - 5.1 mmol/L    Chloride 96 (L) 99 - 110 mmol/L    CO2 24 21 - 32 mmol/L    Anion Gap 16 3 - 16    Glucose 148 (H) 70 - 99 mg/dL    BUN 70 (H) 7 - 20 mg/dL    CREATININE 1.7 (H) 0.6 - 1.2 mg/dL    GFR Non-African American 28 (A) >60    GFR  34 (A) >60    Calcium 10.0 8.3 - 10.6 mg/dL    Total Protein 7.8 6.4 - 8.2 g/dL    Alb 4.0 3.4 - 5.0 g/dL    Albumin/Globulin Ratio 1.1 1.1 - 2.2    Total Bilirubin 0.4 0.0 - 1.0 mg/dL    Alkaline Phosphatase 77 40 - 129 U/L    ALT 15 10 - 40 U/L    AST 21 15 - 37 U/L    Globulin 3.8 g/dL   Lipase    Collection Time: 03/31/19  9:13 PM   Result Value Ref Range    Lipase 496.0 (H) 13.0 - 60.0 U/L   Troponin    Collection Time: 03/31/19  9:13 PM   Result Value Ref Range    Troponin <0.01 <0.01 ng/mL   Lactic Acid, Plasma    Collection Time: 03/31/19  9:13 PM   Result Value Ref Range    Lactic Acid 1.9 0.4 - 2.0 mmol/L   Levetiracetam Level    Collection Time: 03/31/19  9:13 PM   Result Value Ref Range    Levetiracetam Lvl <2.0 (L) 6.0 - 46.0 ug/mL    KEPPRA Dose Amt Unknown    Urinalysis Reflex to Culture    Collection Time: 03/31/19  9:24 PM   Result Value Ref Range    Color, UA YELLOW Straw/Yellow    Clarity, UA Clear Clear    Glucose, Ur Negative Negative mg/dL    Bilirubin Urine Negative Negative    Ketones, Urine Negative Negative mg/dL    Specific Gravity, UA 1.022 1.005 - 1.030    Blood, Urine Negative Negative    pH, UA 5.0 5.0 - 8.0    Protein, UA Negative Negative mg/dL    Urobilinogen, Urine 0.2 <2.0 E.U./dL    Nitrite, Urine Negative Negative    Leukocyte Esterase, Urine Negative Negative    Microscopic Examination Not Indicated     Urine Reflex to Culture Not Indicated     Urine Type Not Specified    POCT Glucose    Collection Time: 03/31/19  9:29 PM   Result Value Ref Range    POC Glucose 113 (H) 70 - 99 mg/dl    Performed on ACCU-CHEK    Lactic Acid, Plasma    Collection Time: 03/31/19 11:44 PM   Result Value Ref Range    Lactic Acid 1.7 0.4 - 2.0 mmol/L   Triglyceride    Collection Time: 04/01/19 12:56 AM   Result Value Ref Range    Triglycerides 78 0 - 150 mg/dL Other Labs      Imaging  CT CHEST ABDOMEN PELVIS WO CONTRAST   Final Result   1. Findings suggesting mild acute pancreatitis at pancreatic head. Correlate   with serum amylase/lipase levels. 2. No CT chest finding to account for radiographic finding with exception of   a focal area of scarring in the lingula left upper lobe which may calcific or   part of the finding. No acute pulmonary disease evident. 3. Cardiomegaly. 4. Calcific atherosclerosis aorta and its branches. CT Head WO Contrast   Final Result   No acute intracranial abnormality. Interval development of findings typical of acute sinusitis bilateral   maxillary sinuses, bilateral ethmoid air cells and left sphenoid sinus. Senescent cerebral changes. XR CHEST PORTABLE   Final Result   1. Indeterminate 2 cm rounded opacity central to medial lower left chest   concerning for new mass. 2. Calcific atherosclerosis aorta. 3. Cardiomegaly. RECOMMENDATION:   IV contrast-enhanced CT chest               ASSESSMENT AND RECOMMENDATIONS   80 y.o. female with a PMH Afib, s/p Watchman, CHF, HTN, HLD,  who presented on 3/31/2019 with AMS. We have been consulted regarding pancreatitis. CT chest/abd/pel showed mid acute pancreatitis at the pancreatic head. Lipase of 496. Additional blood work shows leukocytosis and SHANTEL    IMPRESSION:  1. Acute pancreatitis  2. SHANTEL  3. Leukocytosis    4. UTI:  On cipro      RECOMMENDATIONS:    -ok for clear liquids. Monitor tolerance  -continue aggressive IVF   -Closely monitor electrolytes and replete PRN. -cause of her pancreatitis unknown at this time. Will likely need follow up imaging (MRI vs CT) in couple of months to eval for occult malignancy. If you have any questions or need any further information, please feel free to contact our consult team.  Thank you for allowing us to participate in the care of [address] [place] [address].     Елена Polo PA-C    Attending physician addendum:      I have personally seen and examined the patient, reviewed the patient's medical record and pertinent labs and clinical imaging. I have personally staffed the case with LISANDRO Gu. I agree with her consultation note, exam findings, assessment and plans  as written above. I have made appropriate modifications and edited her assessment and plan where needed to reflect my impression and plans for this patient. 81 yo WF with 3 weeks of reported mild nausea and abdominal pain. She had CT and labs c/w pancreatitis. She has never had prior pancreatitis. She had lethargy and some MS changes. She had been on antibiotics for a UTI. She denies Etoh or other new medications. She has had some loss of appetite but cannot quantify any weight loss. She had normal US, TG, Ca    Exam:   Elderly, flat affect, slight somnolence, but answers questions appropriately. CV- RRR,Abd- soft, mild TTP in epigastrium. Labs, and CT reviewed    Imp: 81 yo with hx of AVR, UTI, here with     1) Acute uncomplicated pancreatitis-  New onset at age 80. CT without contrast showed no complications. No overt masses noted. US negative for gallstones. Normal TG/Ca. Non-smoker, no Etoh. She does have polypharmacy with recent abx for UTI. No overt offending meds noted  2) PATRICIA  3) Leukocytosis NOS-  ? Hemoconcentration. No fever noted  4) Afib- s/p Watchman's-  On ASA and Plavix    Plan:  Supportive care for now  Will have to determine repeat imaging in 4-6 weeks. May need MRCP vs Pancreatic protocol CT (in 4-6 weeks if Creatinine improves). Likely will defer EUS in light of her advanced age and sedation risks  Clears today  Reassess in am.     Thank you for allowing me to participate in this patient's care. If there are any questions or concerns regarding this patient, or the plan we have set in place, please feel free to contact me at [phone].      Shivam Lechuga, DO

## 2019-04-01 NOTE — PROGRESS NOTES
4 Eyes Skin Assessment     The patient is being assess for  Admission    I agree that 2 RN's have performed a thorough Head to Toe Skin Assessment on the patient. ALL assessment sites listed below have been assessed. Areas assessed by both nurses:   [x]   Head, Face, and Ears   [x]   Shoulders, Back, and Chest  [x]   Arms, Elbows, and Hands   [x]   Coccyx, Sacrum, and IschIum  [x]   Legs, Feet, and Heels        Does the Patient have Skin Breakdown?   No    Scattered bruising noted all over        Alberto Prevention initiated:  Yes   Wound Care Orders initiated:  No      [facility] nurse consulted for Pressure Injury (Stage 3,4, Unstageable, DTI, NWPT, and Complex wounds), New and Established Ostomies:  No      Nurse 1 eSignature: Electronically signed by Jennifer Jamil RN on 4/1/19 at 2:58 AM    **SHARE this note so that the co-signing nurse is able to place an eSignature**    Nurse 2 eSignature: Electronically signed by Marivel Bae RN on 4/1/19 at 3:04 AM

## 2019-04-01 NOTE — PROGRESS NOTES
intake: NPO  · Anthropometric Measures:  · Ht: 4' 11\" (149.9 cm)   · Current Body Wt: 116 lb (52.6 kg)  · Usual Body Wt: 138 lb (62.6 kg)  · % Weight Change:  ,  -16% x 2 months   · Ideal Body Wt: 98 lb (44.5 kg),   · BMI Classification: BMI 18.5 - 24.9 Normal Weight    Nutrition Interventions:   Continue NPO  Continued Inpatient Monitoring    Nutrition Evaluation:   · Evaluation: Goals set   · Goals:  Tolerate most appropriate nutrition therapy     · Monitoring: Nutrition Progression, Diet Tolerance, Weight      Electronically signed by Indira Salinas RD, LD on 4/1/19 at 2:57 PM    Contact Number: [phone]

## 2019-04-01 NOTE — ED PROVIDER NOTES
for headaches. Hematological: Negative for adenopathy. Psychiatric/Behavioral: Negative for confusion. Except as noted above the remainder of the review of systems was reviewed and negative. PAST MEDICAL HISTORY     Past Medical History:   Diagnosis Date    Atrial fibrillation ([facility] [facility]     Carpal tunnel syndrome, bilateral     Cataract     CHF (congestive heart failure) (HCC)     Hyperlipidemia     Hypertension     MI (myocardial infarction) ([facility] [address]          SURGICALHISTORY       Past Surgical History:   Procedure Laterality Date    CARPAL TUNNEL RELEASE      CATARACT REMOVAL      EYE SURGERY      OTHER SURGICAL HISTORY  07/19/2017    Watchman procedure    SPINE SURGERY      TOTAL KNEE ARTHROPLASTY Bilateral          CURRENT MEDICATIONS       Previous Medications    ASPIRIN 81 MG CHEWABLE TABLET    Take 1 tablet by mouth daily    CALCIUM CARBONATE 600 MG TABS TABLET    Take 1 tablet by mouth daily    CINNAMON PO    Take 1 capsule by mouth daily     CLOPIDOGREL (PLAVIX) 75 MG TABLET    TAKE 1 TABLET BY MOUTH DAILY    COENZYME Q10 100 MG CAPS    Take 100 mg by mouth daily. GARLIC PO    Take 1 capsule by mouth daily     HYDROCHLOROTHIAZIDE (HYDRODIURIL) 25 MG TABLET    TAKE 1 TABLET BY MOUTH DAILY    LEVETIRACETAM (KEPPRA) 500 MG TABLET    Take 1 tablet by mouth 2 times daily    LISINOPRIL (PRINIVIL;ZESTRIL) 40 MG TABLET    Take 40 mg by mouth daily. LORATADINE (CLARITIN) 10 MG TABLET    Take 10 mg by mouth daily. OMEGA-3 FATTY ACIDS (FISH OIL) 1000 MG CAPS    Take 1,000 mg by mouth daily. OMEPRAZOLE (PRILOSEC) 40 MG CAPSULE    Take 40 mg by mouth daily. Take 40 mg by mouth daily. PRAVASTATIN (PRAVACHOL) 40 MG TABLET    Take 40 mg by mouth every evening.     TRAZODONE (DESYREL) 50 MG TABLET    Take 1 tablet by mouth nightly    VENLAFAXINE (EFFEXOR XR) 75 MG EXTENDED RELEASE CAPSULE    Take 75 mg by mouth daily       ALLERGIES     Elena advanced aspirin ex st [aspirin]    FAMILY HISTORY       Family History   Problem Relation Age of Onset    Other Mother     Other Father           SOCIAL HISTORY       Social History     Socioeconomic History    Marital status:      Spouse name: None    Number of children: None    Years of education: None    Highest education level: None   Occupational History    None   Social Needs    Financial resource strain: None    Food insecurity:     Worry: None     Inability: None    Transportation needs:     Medical: None     Non-medical: None   Tobacco Use    Smoking status: Never Smoker    Smokeless tobacco: Never Used   Substance and Sexual Activity    Alcohol use: No    Drug use: No    Sexual activity: Not Currently   Lifestyle    Physical activity:     Days per week: None     Minutes per session: None    Stress: None   Relationships    Social connections:     Talks on phone: None     Gets together: None     Attends Mu-ism service: None     Active member of club or organization: None     Attends meetings of clubs or organizations: None     Relationship status: None    Intimate partner violence:     Fear of current or ex partner: None     Emotionally abused: None     Physically abused: None     Forced sexual activity: None   Other Topics Concern    None   Social History Narrative    None       SCREENINGS             PHYSICAL EXAM    (up to 7 for level 4, 8 or more for level 5)     ED Triage Vitals   BP Temp Temp src Pulse Resp SpO2 Height Weight   -- -- -- -- -- -- -- --       Physical Exam   Constitutional: She is oriented to person, place, and time. She appears well-developed and well-nourished. HENT:   Head: Normocephalic and atraumatic. Eyes: Conjunctivae are normal. Right eye exhibits no discharge. Left eye exhibits no discharge. Neck: Neck supple. No meningismus   Cardiovascular: Normal rate. Exam reveals no friction rub. No murmur heard. Pulmonary/Chest: Effort normal and breath sounds normal. No stridor.  No respiratory distress. She has no wheezes. Abdominal: Soft. Bowel sounds are normal. She exhibits no distension and no mass. There is no tenderness. There is no guarding. Musculoskeletal: Normal range of motion. Neurological: She is alert and oriented to person, place, and time. Skin: Skin is warm and dry. Capillary refill takes 2 to 3 seconds. She is not diaphoretic. Psychiatric: She has a normal mood and affect. Her behavior is normal.   Nursing note and vitals reviewed. DIAGNOSTIC RESULTS     EKG: All EKG's are interpreted by the Emergency Department Physician who either signs or Co-signsthis chart in the absence of a cardiologist.    The Ekg interpreted by me shows  atrial fibrillation with a rate of 97 and PVCs  Axis is   Left axis deviation  QTc is  467 msec  Intervals and Durations are unremarkable. ST Segments: nonspecific changes  Will compared to an EKG performed on February 25, 2019 the patient's QTC is prolonged from 394-467 ms. The patient's heart rate is increased from 78-97. RADIOLOGY:   Non-plain filmimages such as CT, Ultrasound and MRI are read by the radiologist. Plain radiographic images are visualized and preliminarily interpreted by the emergency physician with the below findings:        Interpretation per the Radiologist below, if available at the time ofthis note:    CT Head WO Contrast   Final Result   No acute intracranial abnormality. Interval development of findings typical of acute sinusitis bilateral   maxillary sinuses, bilateral ethmoid air cells and left sphenoid sinus. Senescent cerebral changes. XR CHEST PORTABLE   Final Result   1. Indeterminate 2 cm rounded opacity central to medial lower left chest   concerning for new mass. 2. Calcific atherosclerosis aorta. 3. Cardiomegaly.       RECOMMENDATION:   IV contrast-enhanced CT chest               ED BEDSIDE ULTRASOUND:   Performed by ED Physician - none    LABS:  Labs Reviewed   CBC Laboratory  [address]  [place] [address]   Phone [phone]   LEVETIRACETAM LEVEL    Narrative:     Performed at:  [facility]  [address]  [place] [address]   Phone [phone]   LACTIC ACID, PLASMA       All other labs were within normal range or not returned as of this dictation. EMERGENCY DEPARTMENT COURSE and DIFFERENTIAL DIAGNOSIS/MDM:   Vitals:    Vitals:    03/31/19 2036 03/31/19 2059 03/31/19 2105 03/31/19 2120   BP:  (!) 183/71  (!) 174/89   Pulse: 88 94 86 78   Resp: 22 17 24 16   Temp:  96.1 °F (35.6 °C)     TempSrc:  Rectal     SpO2: 100% 100% (!) 86% 97%   Weight:               MDM  Number of Diagnoses or Management Options  Abnormal CXR:   Abnormal serum level of lipase:   Acute maxillary sinusitis, recurrence not specified:   Confusion:   Hypoxia:   Leukocytosis, unspecified type:   Diagnosis management comments: DDX: UTI, pneumonia, sepsis,     Patient is significant for chest x-ray concerning for potential adjustments. Her lipase is elevated but the patient has no abdominal pain by history or by my examination. She does have some sinusitis. This would not explain her confusion hypoxia and leukocytosis. At this time we do not feel the patient is septic. As spoken to the medicine service. At this time it is our that we hold antibiotics and they will examine the patient. I do not suspect meningitis or encephalitis at this time. CRITICAL CARE TIME   Total Critical Care time was 0 minutes, excluding separately reportable procedures. There was a high probability of clinically significant/life threatening deterioration in the patient's condition which required my urgent intervention. CONSULTS:  None    PROCEDURES:  Unless otherwise noted below, none     Procedures    FINAL IMPRESSION      1. Abnormal CXR    2. Leukocytosis, unspecified type    3. Abnormal serum level of lipase    4. Confusion    5.  Acute maxillary sinusitis, recurrence not specified    6. Hypoxia          DISPOSITION/PLAN   DISPOSITION Decision To Admit 03/31/2019 10:16:14 PM      PATIENT REFERRED TO:  No follow-up provider specified.     DISCHARGE MEDICATIONS:  New Prescriptions    No medications on file          (Please note that portions of this note were completed with a voice recognition program.Efforts were made to edit the dictations but occasionally words are mis-transcribed.)    Divya Dey MD (electronically signed)  Attending Emergency Physician          Divya Dey MD  03/31/19 4193

## 2019-04-01 NOTE — H&P
Hospital Medicine History & Physical      Patient Name: Yoly Bush    : 1930    PCP: Carla Lazo MD    Date of Service:  Patient seen and examined on 3/31/2019     Chief Complaint:  Altered mental status    History Of Present Illness:    Yoly Bush is a 80 y.o. female with a PMH of CKD, A fib, HTN who presented to ED with complaint of AMS. She is accompanied by her family. They reports she was discharged from nursing home on 3/21/19. She was her normal self until 3/25/19 when she became acutely confused, so they took her to her PCP the next day. UA was collected which was positive for E coli. She was started on augmentin for UTI on 3/29/19. However, patient remained confused despite several days of antibiotic treatment and told family she felt very badly and was afraid she might die. So family called 46 to bring patient to ED for further evaluation. Patient is alert and oriented x 3 at this time, but is still having episodes of confusion per family. Patient also reports ongoing congestion, cough, PND, runny nose, shortness of breath for about a month which has improved the last few days. She reports decreased appetite, fatigue, and nausea for the last couple days. She reports mild lower abdominal/pelvic pain but no epigastric pain. She denies fever, dizziness, chest pain, edema, vomiting, diarrhea, constipation, pain with urination. Past Medical History:    Patient  has a past medical history of Atrial fibrillation ([address] Carpal tunnel syndrome, bilateral, Cataract, CHF (congestive heart failure) ([address] Hyperlipidemia, Hypertension, and MI (myocardial infarction) ([address] Past Surgical History:    Patient  has a past surgical history that includes Total knee arthroplasty (Bilateral); Spine surgery; Carpal tunnel release; eye surgery; Cataract removal; and other surgical history (2017).     Medications Prior to Admission:      Prior to Admission medications Medication Sig Start Date End Date Taking? Authorizing Provider   HYDROcodone-acetaminophen (NORCO) 7.5-325 MG per tablet Take 1 tablet by mouth every 6 hours as needed for Pain. Yes Historical Provider, MD   amoxicillin-clavulanate (AUGMENTIN) 875-125 MG per tablet Take 1 tablet by mouth 2 times daily 3/29/19 4/5/19 Yes Historical Provider, MD   aspirin 81 MG chewable tablet Take 1 tablet by mouth daily 3/4/19  Yes Linda Sparks MD   venlafaxine (EFFEXOR XR) 75 MG extended release capsule Take 75 mg by mouth daily   Yes Historical Provider, MD   hydrochlorothiazide (HYDRODIURIL) 25 MG tablet TAKE 1 TABLET BY MOUTH DAILY 10/16/18  Yes Cristobal Dawson MD   traZODone (DESYREL) 50 MG tablet Take 1 tablet by mouth nightly 11/2/17  Yes Cristobal Dawson MD   calcium carbonate 600 MG TABS tablet Take 1 tablet by mouth daily   Yes Historical Provider, MD   CINNAMON PO Take 1 capsule by mouth daily    Yes Historical Provider, MD   GARLIC PO Take 1 capsule by mouth daily    Yes Historical Provider, MD   loratadine (CLARITIN) 10 MG tablet Take 10 mg by mouth daily. Yes Historical Provider, MD   Omega-3 Fatty Acids (FISH OIL) 1000 MG CAPS Take 1,000 mg by mouth daily. Yes Historical Provider, MD   Coenzyme Q10 100 MG CAPS Take 100 mg by mouth daily. Yes Historical Provider, MD   pravastatin (PRAVACHOL) 40 MG tablet Take 40 mg by mouth every evening. Yes Baron Philippe MD   omeprazole (PRILOSEC) 40 MG capsule Take 40 mg by mouth daily. Take 40 mg by mouth daily. Yes Jewel Laura MD   lisinopril (PRINIVIL;ZESTRIL) 40 MG tablet Take 40 mg by mouth daily.      Yes Historical Provider, MD   tiZANidine (ZANAFLEX) 2 MG tablet Take 2 mg by mouth every 8 hours as needed    Historical Provider, MD   sotalol (BETAPACE) 80 MG tablet Take 40 mg by mouth daily No frequency given    Historical Provider, MD   polyethylene glycol-electrolytes (TRILYTE) 420 g solution Take 4,000 mLs by mouth once As directed on package Historical Provider, MD   levETIRAcetam (KEPPRA) 500 MG tablet Take 1 tablet by mouth 2 times daily 3/3/19   Navarro Casey MD   clopidogrel (PLAVIX) 75 MG tablet TAKE 1 TABLET BY MOUTH DAILY 11/13/18   Bertha Landa MD       Allergies:  Edwardo Crawford advanced aspirin ex st [aspirin]    Social History:      TOBACCO:   reports that she has never smoked. She has never used smokeless tobacco.  ETOH:   reports that she does not drink alcohol. DRUGS:  reports that she does not use drugs. Family History:      Reviewed in detail positive as follows:        Problem Relation Age of Onset    Other Mother     Other Father        REVIEW OF SYSTEMS:   Pertinent positives as noted in the HPI. All other systems reviewed and negative. PHYSICAL EXAM PERFORMED:    BP (!) 143/89   Pulse 80   Temp 97.2 °F (36.2 °C) (Oral)   Resp 16   Ht 4' 11\" (1.499 m)   Wt 116 lb 13.5 oz (53 kg)   SpO2 98%   BMI 23.60 kg/m²     General appearance:  Awake, alert, no apparent distress  HEENT:  Normocephalic, atraumatic without obvious deformity. PERRL. EOM intact. Conjunctivae/corneas clear. Neck: Supple, with full range of motion. No JVD. Trachea midline. Respiratory:  Clear to auscultation bilaterally without rales, wheezes, or rhonchi. Normal respiratory effort. Cardiovascular:  Regular rate and rhythm without murmurs, rubs or gallops. Abdomen: Soft, NT, ND, without rebound or guarding. Normal bowel sounds. Extremities:  No clubbing, cyanosis, or edema bilaterally. Full range of motion without deformity. +2 palpable pulses, equal bilaterally. Capillary refill brisk,< 3 seconds   Skin: No rashes or lesions. Warm/dry. Neurologic:  Neurovascularly intact without any focal sensory/motor deficits. Cranial nerves: II-XII intact, grossly non-focal. Alert and oriented x 3. Normal speech. Psychiatric:  Thought content appropriate, normal insight.     Labs:   CBC   Recent Labs     03/31/19  2113   WBC 21.4*   HGB 15.8   HCT 48.9*    RENAL  Recent Labs     03/31/19 2113      K 4.9   CL 96*   CO2 24   BUN 70*   CREATININE 1.7*     LFTS  Recent Labs     03/31/19 2113   AST 21   ALT 15   BILITOT 0.4   ALKPHOS 77     COAG  No results for input(s): INR in the last 72 hours.      CARDIAC ENZYMES  Recent Labs     03/31/19 2113   TROPONINI <0.01     LIPIDS  Cholesterol, Total   Date/Time Value Ref Range Status   02/28/2019 01:23  0 - 199 mg/dL Final     Triglycerides   Date/Time Value Ref Range Status   04/01/2019 12:56 AM 78 0 - 150 mg/dL Final     HDL   Date/Time Value Ref Range Status   02/28/2019 01:23 PM 81 (H) 40 - 60 mg/dL Final     LDL Calculated   Date/Time Value Ref Range Status   02/28/2019 01:23 PM 47 <100 mg/dL Final     URINE CULTURE  Specimen Collected on   Urine, Clean Catch 3/26/2019 3:48 PM     SPECIMEN DESCRIPTION Urine, Clean Catch   SPECIMEN TYPE URINE   CULTURE RESULTS Positive Growth   CULTURE RESULTS ESCHERICHIA COLI   REPORT STATUS 03/28/2019 FINAL REPORT   CULTURE RESULTS (NOTE) 02866 CFU/mL Escherichia coli [facility]     Antibiotic Method Susceptibility   Amikacin Amikacin <=16: Sensitive   Comment: SUSCEPTIBLE   Ampicillin Ampicillin <=8: Sensitive   Comment: SUSCEPTIBLE   Ampicillin + Sulbactam Ampicillin + Sulbactam <=1/0.5: Sensitive   Comment: SUSCEPTIBLE   Aztreonam Aztreonam <=4: Sensitive   Comment: SUSCEPTIBLE   Ciprofloxacin Ciprofloxacin <=1: Sensitive   Comment: SUSCEPTIBLE   Ertapenem Ertapenem <=0.5: Sensitive   Comment: SUSCEPTIBLE   Gentamicin Gentamicin <=1: Sensitive   Comment: SUSCEPTIBLE   Imipenem Imipenem <=0.5: Sensitive   Comment: SUSCEPTIBLE   Levofloxacin Levofloxacin <=0.25: Sensitive   Comment: SUSCEPTIBLE   Meropenem Meropenem <=1: Sensitive   Comment: SUSCEPTIBLE   Nitrofurantoin Nitrofurantoin <=32: Sensitive   Comment: SUSCEPTIBLE   PIP Tazo PIP Tazo <=4: Sensitive   Comment: SUSCEPTIBLE Tetracycline Tetracycline <=4: Sensitive   Comment: SUSCEPTIBLE   Tobramycin Tobramycin <=1: Sensitive   Comment: SUSCEPTIBLE   Amoxicillin + Clavulanate Amoxicillin + Clavulanate <=4/2: Sensitive   Comment: SUSCEPTIBLE   Cefazolin Cefazolin <=2: Sensitive   Comment: SUSCEPTIBLE   Cefoxitin Cefoxitin <=8: Sensitive   Comment: SUSCEPTIBLE   Trimethoprim + Sulfamethoxazole Trimethoprim + Sulfamethoxazole <=2/38: Sensitive   Comment: SUSCEPTIBLE   Comment: ESCHERICHIA COLI       Radiology:     CT CHEST ABDOMEN PELVIS WO CONTRAST   Final Result   1. Findings suggesting mild acute pancreatitis at pancreatic head. Correlate   with serum amylase/lipase levels. 2. No CT chest finding to account for radiographic finding with exception of   a focal area of scarring in the lingula left upper lobe which may calcific or   part of the finding. No acute pulmonary disease evident. 3. Cardiomegaly. 4. Calcific atherosclerosis aorta and its branches. CT Head WO Contrast   Final Result   No acute intracranial abnormality. Interval development of findings typical of acute sinusitis bilateral   maxillary sinuses, bilateral ethmoid air cells and left sphenoid sinus. Senescent cerebral changes. XR CHEST PORTABLE   Final Result   1. Indeterminate 2 cm rounded opacity central to medial lower left chest   concerning for new mass. 2. Calcific atherosclerosis aorta. 3. Cardiomegaly. RECOMMENDATION:   IV contrast-enhanced CT chest             EKG:   Read by ED physician in the absence of a cardiologist:  atrial fibrillation with a rate of 97 and PVCs  Axis is   Left axis deviation  QTc is  467 msec  Intervals and Durations are unremarkable. ST Segments: nonspecific changes  Will compared to an EKG performed on February 25, 2019 the patient's QTC is prolonged from 394-467 ms. The patient's heart rate is increased from 78-97. ASSESSMENT/PLAN:    Pancreatitis, acute   Etiology unclear.  No epigastric pain but elevated lipase with mild pancreatitis on CT abdomen  LFTs and Bili levels are normal, which would argue against Choledocholithiasis as a cause. Check TGs  Hold HCTZ  NPO, pain control, antiemetics  IVF with LR due to better outcomes in acute pancreatitis, at 250mL/hr x 2 L then decrease to 150mL/hr    GI consulted    SHANTEL on CKD  Cr baseline 1.1-1.3, today 1.7  Prerenal, due to dehydration  IVF  Avoid nephrotoxic medications - Hold lisinopril  Renally dose medications  Monitor for electrolyte abnormalities  Check: UA/CS, Yasmine/UCr, uric acid, TSH, U osmolality    A fib  Rate controlled  S/p watchman  Hold ASA, plavix while NPO  IV Labetolol PRN    Acute hypoxic respiratory failure, POA  With criteria: < 92% on RA, RR> 18, etiology unclear. Possibly A fib. Slowly being weaned off O2 and tolerating decrease  Chest CT negative for acute process  Supplemental 02 as necessary to keep SaO2 > 90%, not on O2 at home     Leukocytosis  Etiology unclear - pancreatitis vs UTI vs sinus infection  Will monitor    HTN  Hold home lisinopril and HCTZ d/t SHANTEL and pancreatitis respectively   IV Labetalol PRN    E coli UTI  Augmentin started 3/29/19. Changed to IV cipro while NPO    Sinus infection  Head CT suggestive of sinus infection. Patient reports ongoing cough, congestion, PND, runny nose SOB for the last month which has improved the last few days. Patient was started on augmentin for UTI on 3/29/19 which was changed to cipro on admission d/t pancreatitis/NPO. Would consider extending treatment to 10-14 days to cover sinus infection as well. AMS  Currently alert and oriented during my evaluation but family reports she is still having episodes of confusion  Possibly resolving UTI?   Head CT notes sinusitis but otherwise negative for acute process      DVT prophylaxis: Heparin  GI prophylaxis: Protonix  Probiotic if on abx: Patient NPO    Diet: NPO, Effective now  Code Status: DNR-CCA    Consults:  JORGE CONSULT TO GI    Disposition: Admit to Inpatient   ELOS: Greater than two midnights due to medical therapy     Jerardo Holm PA-C     Thank you Jerica Yao MD for the opportunity to be involved in this patient's care. If you have any questions or concerns please feel free to contact me at [phone].

## 2019-04-01 NOTE — PLAN OF CARE
Problem: Falls - Risk of:  Goal: Will remain free from falls  Description  Will remain free from falls  4/1/2019 1043 by Fabio Runner, RN  Outcome: Ongoing  Note:   Fall protocol in place. Bed and chair alarms in place. Non skid footwear on at all times. Room clean and clutter free. Call light with in reach. Education on proper call light use and to call before ambulating. Problem: Coping:  Goal: Ability to remain calm will improve  Description  Ability to remain calm will improve  4/1/2019 1043 by Fabio Runner, RN  Outcome: Ongoing       Problem: Falls - Risk of:  Goal: Will remain free from falls  Description  Will remain free from falls  4/1/2019 1043 by Fabio Runner, RN  Outcome: Ongoing  Note:   Fall protocol in place. Bed and chair alarms in place. Non skid footwear on at all times. Room clean and clutter free. Call light with in reach. Education on proper call light use and to call before ambulating.        Problem: Coping:  Goal: Ability to remain calm will improve  Description  Ability to remain calm will improve  4/1/2019 1043 by Fabio Runner, RN  Outcome: Ongoing       Problem: Safety:  Goal: Ability to remain free from injury will improve  Description  Ability to remain free from injury will improve  4/1/2019 1043 by Fabio Runner, RN  Outcome: Ongoing       Problem: Self-Care:  Goal: Ability to participate in self-care as condition permits will improve  Description  Ability to participate in self-care as condition permits will improve  Outcome: Ongoing     Problem: Infection:  Goal: Will remain free from infection  Description  Will remain free from infection  4/1/2019 1043 by Fabio Runner, RN  Outcome: Ongoing       Problem: Daily Care:  Goal: Daily care needs are met  Description  Daily care needs are met  Outcome: Ongoing     Problem: Pain:  Goal: Pain level will decrease  Description  Pain level will decrease  Outcome: Ongoing  Note:   Patient has no

## 2019-04-01 NOTE — PROGRESS NOTES
Pt to Rm via ED stretcher with all personal belongings. Oriented to room and role as RN. Telemonitor # [id number] on and verified by Norbert Coppola. Pt transfered to bed, alert and oriented x4. Assessment of pt in progress. POC and education initiated per protocol. Call light within reach. Bed in lowest position and wheels locked. Room is free of clutter. Personal belongings within reach. No current complaints at this time. Pt denies pain, SOB, diarrhea. Will continue to monitor.

## 2019-04-01 NOTE — PROGRESS NOTES
Hospitalist Progress Note      PCP: Cherylene Bergamo, MD    Date of Admission: 3/31/2019    Chief Complaint: altered mental status    Subjective: Pt seen and examined. She feels ok, states that her abdominal pain has improved. Medications:  Reviewed    Infusion Medications    lactated ringers 150 mL/hr at 04/01/19 0915     Scheduled Medications    sodium chloride flush  10 mL Intravenous 2 times per day    heparin (porcine)  5,000 Units Subcutaneous 3 times per day    levetiracetam  500 mg Intravenous Q12H    pantoprazole  40 mg Intravenous QAM AC    ciprofloxacin  400 mg Intravenous Q24H     PRN Meds: sodium chloride flush, ondansetron, bisacodyl, morphine, acetaminophen, labetalol      Intake/Output Summary (Last 24 hours) at 4/1/2019 1531  Last data filed at 4/1/2019 0650  Gross per 24 hour   Intake 1375.86 ml   Output 350 ml   Net 1025.86 ml       Exam:    /81   Pulse 100   Temp 97.5 °F (36.4 °C) (Oral)   Resp 16   Ht 4' 11\" (1.499 m)   Wt 116 lb 13.5 oz (53 kg)   SpO2 96%   BMI 23.60 kg/m²     General appearance: No apparent distress, appears stated age and cooperative. HEENT: Pupils equal, round, and reactive to light. Conjunctivae/corneas clear. Neck: Supple, with full range of motion. No jugular venous distention. Trachea midline. Respiratory:  Normal respiratory effort. Clear to auscultation, bilaterally without Rales/Wheezes/Rhonchi. Cardiovascular: Regular rate and rhythm with normal S1/S2 without murmurs, rubs or gallops. Abdomen: Soft, mild TTP in epigastric region, non-distended with normal bowel sounds. Musculoskeletal: No clubbing, cyanosis or edema bilaterally. Full range of motion without deformity. Skin: Skin color, texture, turgor normal.  No rashes or lesions. Neurologic:  Neurovascularly intact without any focal sensory/motor deficits.  Cranial nerves: II-XII intact, grossly non-focal.  Psychiatric: Alert and oriented, thought content appropriate, normal Assessment/Plan:    Active Hospital Problems    Diagnosis Date Noted    Moderate malnutrition ([facility] [address] [E44.0] 04/01/2019    Altered mental status [R41.82] 03/31/2019          Pancreatitis, acute   Etiology unclear. No epigastric pain but elevated lipase with mild pancreatitis on CT abdomen. ? Medication-induced. LFTs and Bili levels are normal, which would argue against Choledocholithiasis as a cause. TGs WNL  Hold HCTZ  Clear liquid diet today  IVF with LR  GI consulted, recs appreciated; may need follow-up imaging to evaluate for occult malignancy     SHANTEL on CKD  Cr baseline 1.1-1.3, today 1.6  Prerenal, due to dehydration  IVF  Avoid nephrotoxic medications - Hold lisinopril  Renally dose medications  Monitor for electrolyte abnormalities  Check: UA/CS, Yasmine/UCr, uric acid, TSH, U osmolality     A fib  Rate controlled  S/p watchman  Resume ASA, plavix  IV Labetolol PRN     Acute hypoxic respiratory failure, POA  With criteria: < 92% on RA, RR> 18, etiology unclear. Possibly A fib. Slowly being weaned off O2 and tolerating decrease  Chest CT negative for acute process  Supplemental 02 as necessary to keep SaO2 > 90%, not on O2 at home      Leukocytosis  Etiology unclear - pancreatitis vs UTI vs sinus infection  Will monitor     HTN  Hold home lisinopril and HCTZ d/t SHANTEL and pancreatitis respectively   IV Labetalol PRN     E coli UTI  Augmentin started 3/29/19. Changed to IV cipro while NPO     Sinus infection  Head CT suggestive of sinus infection. Patient reports ongoing cough, congestion, PND, runny nose SOB for the last month which has improved the last few days. Patient was started on augmentin for UTI on 3/29/19 which was changed to cipro on admission d/t pancreatitis/NPO.  Would consider extending treatment to 10-14 days to cover sinus infection as well.     Acute metabolic encephalopathy - improved  Currently alert and oriented during my evaluation but family reports she is still having

## 2019-04-01 NOTE — ED NOTES
RT called, notified we cannot get an O2 stat, asked to bring ear probe.       Wally Baker RN  03/31/19 2031

## 2019-04-02 LAB
A/G RATIO: 0.8 (ref 1.1–2.2)
ALBUMIN SERPL-MCNC: 2.8 G/DL (ref 3.4–5)
ALP BLD-CCNC: 62 U/L (ref 40–129)
ALT SERPL-CCNC: 13 U/L (ref 10–40)
ANION GAP SERPL CALCULATED.3IONS-SCNC: 13 MMOL/L (ref 3–16)
AST SERPL-CCNC: 28 U/L (ref 15–37)
BASOPHILS ABSOLUTE: 0 K/UL (ref 0–0.2)
BASOPHILS RELATIVE PERCENT: 0.2 %
BILIRUB SERPL-MCNC: 0.5 MG/DL (ref 0–1)
BUN BLDV-MCNC: 39 MG/DL (ref 7–20)
CALCIUM SERPL-MCNC: 9 MG/DL (ref 8.3–10.6)
CHLORIDE BLD-SCNC: 100 MMOL/L (ref 99–110)
CO2: 19 MMOL/L (ref 21–32)
CREAT SERPL-MCNC: 1 MG/DL (ref 0.6–1.2)
EKG ATRIAL RATE: 288 BPM
EKG DIAGNOSIS: NORMAL
EKG Q-T INTERVAL: 430 MS
EKG QRS DURATION: 106 MS
EKG QTC CALCULATION (BAZETT): 495 MS
EKG R AXIS: -54 DEGREES
EKG T AXIS: -80 DEGREES
EKG VENTRICULAR RATE: 80 BPM
EOSINOPHILS ABSOLUTE: 0 K/UL (ref 0–0.6)
EOSINOPHILS RELATIVE PERCENT: 0.2 %
GFR AFRICAN AMERICAN: >60
GFR NON-AFRICAN AMERICAN: 52
GLOBULIN: 3.3 G/DL
GLUCOSE BLD-MCNC: 47 MG/DL (ref 70–99)
GLUCOSE BLD-MCNC: 61 MG/DL (ref 70–99)
GLUCOSE BLD-MCNC: 63 MG/DL (ref 70–99)
GLUCOSE BLD-MCNC: 64 MG/DL (ref 70–99)
GLUCOSE BLD-MCNC: 92 MG/DL (ref 70–99)
GLUCOSE BLD-MCNC: 97 MG/DL (ref 70–99)
HCT VFR BLD CALC: 40.8 % (ref 36–48)
HEMOGLOBIN: 12.9 G/DL (ref 12–16)
LIPASE: 117 U/L (ref 13–60)
LYMPHOCYTES ABSOLUTE: 2.2 K/UL (ref 1–5.1)
LYMPHOCYTES RELATIVE PERCENT: 11.5 %
MCH RBC QN AUTO: 30.6 PG (ref 26–34)
MCHC RBC AUTO-ENTMCNC: 31.5 G/DL (ref 31–36)
MCV RBC AUTO: 96.9 FL (ref 80–100)
MONOCYTES ABSOLUTE: 1.6 K/UL (ref 0–1.3)
MONOCYTES RELATIVE PERCENT: 8.1 %
NEUTROPHILS ABSOLUTE: 15.5 K/UL (ref 1.7–7.7)
NEUTROPHILS RELATIVE PERCENT: 80 %
PDW BLD-RTO: 14 % (ref 12.4–15.4)
PERFORMED ON: ABNORMAL
PERFORMED ON: NORMAL
PERFORMED ON: NORMAL
PLATELET # BLD: 159 K/UL (ref 135–450)
PMV BLD AUTO: 9.3 FL (ref 5–10.5)
POTASSIUM REFLEX MAGNESIUM: 5.5 MMOL/L (ref 3.5–5.1)
POTASSIUM SERPL-SCNC: 4.3 MMOL/L (ref 3.5–5.1)
RBC # BLD: 4.21 M/UL (ref 4–5.2)
SODIUM BLD-SCNC: 132 MMOL/L (ref 136–145)
TOTAL PROTEIN: 6.1 G/DL (ref 6.4–8.2)
WBC # BLD: 19.3 K/UL (ref 4–11)

## 2019-04-02 PROCEDURE — 97535 SELF CARE MNGMENT TRAINING: CPT

## 2019-04-02 PROCEDURE — 93005 ELECTROCARDIOGRAM TRACING: CPT | Performed by: INTERNAL MEDICINE

## 2019-04-02 PROCEDURE — 36415 COLL VENOUS BLD VENIPUNCTURE: CPT

## 2019-04-02 PROCEDURE — 83690 ASSAY OF LIPASE: CPT

## 2019-04-02 PROCEDURE — 1200000000 HC SEMI PRIVATE

## 2019-04-02 PROCEDURE — 6360000002 HC RX W HCPCS: Performed by: PHYSICIAN ASSISTANT

## 2019-04-02 PROCEDURE — 85025 COMPLETE CBC W/AUTO DIFF WBC: CPT

## 2019-04-02 PROCEDURE — 97116 GAIT TRAINING THERAPY: CPT | Performed by: PHYSICAL THERAPIST

## 2019-04-02 PROCEDURE — 94760 N-INVAS EAR/PLS OXIMETRY 1: CPT

## 2019-04-02 PROCEDURE — 80053 COMPREHEN METABOLIC PANEL: CPT

## 2019-04-02 PROCEDURE — C9113 INJ PANTOPRAZOLE SODIUM, VIA: HCPCS | Performed by: PHYSICIAN ASSISTANT

## 2019-04-02 PROCEDURE — 6370000000 HC RX 637 (ALT 250 FOR IP): Performed by: INTERNAL MEDICINE

## 2019-04-02 PROCEDURE — 97166 OT EVAL MOD COMPLEX 45 MIN: CPT

## 2019-04-02 PROCEDURE — 2580000003 HC RX 258: Performed by: PHYSICIAN ASSISTANT

## 2019-04-02 PROCEDURE — 97530 THERAPEUTIC ACTIVITIES: CPT | Performed by: PHYSICAL THERAPIST

## 2019-04-02 PROCEDURE — 97162 PT EVAL MOD COMPLEX 30 MIN: CPT | Performed by: PHYSICAL THERAPIST

## 2019-04-02 PROCEDURE — 93010 ELECTROCARDIOGRAM REPORT: CPT | Performed by: INTERNAL MEDICINE

## 2019-04-02 PROCEDURE — 97530 THERAPEUTIC ACTIVITIES: CPT

## 2019-04-02 PROCEDURE — 84132 ASSAY OF SERUM POTASSIUM: CPT

## 2019-04-02 PROCEDURE — 2580000003 HC RX 258: Performed by: INTERNAL MEDICINE

## 2019-04-02 RX ORDER — DEXTROSE MONOHYDRATE 25 G/50ML
50 INJECTION, SOLUTION INTRAVENOUS ONCE
Status: COMPLETED | OUTPATIENT
Start: 2019-04-02 | End: 2019-04-02

## 2019-04-02 RX ADMIN — LEVETIRACETAM 500 MG: 5 INJECTION INTRAVENOUS at 21:03

## 2019-04-02 RX ADMIN — CLOPIDOGREL BISULFATE 75 MG: 75 TABLET ORAL at 08:41

## 2019-04-02 RX ADMIN — SODIUM CHLORIDE, POTASSIUM CHLORIDE, SODIUM LACTATE AND CALCIUM CHLORIDE: 600; 310; 30; 20 INJECTION, SOLUTION INTRAVENOUS at 03:00

## 2019-04-02 RX ADMIN — Medication 10 ML: at 21:04

## 2019-04-02 RX ADMIN — ASPIRIN 81 MG 81 MG: 81 TABLET ORAL at 08:41

## 2019-04-02 RX ADMIN — DEXTROSE MONOHYDRATE 50 ML: 25 INJECTION, SOLUTION INTRAVENOUS at 15:16

## 2019-04-02 RX ADMIN — HEPARIN SODIUM 5000 UNITS: 5000 INJECTION INTRAVENOUS; SUBCUTANEOUS at 14:25

## 2019-04-02 RX ADMIN — CIPROFLOXACIN 400 MG: 2 INJECTION, SOLUTION INTRAVENOUS at 00:37

## 2019-04-02 RX ADMIN — MORPHINE SULFATE 2 MG: 2 INJECTION, SOLUTION INTRAMUSCULAR; INTRAVENOUS at 05:54

## 2019-04-02 RX ADMIN — HEPARIN SODIUM 5000 UNITS: 5000 INJECTION INTRAVENOUS; SUBCUTANEOUS at 21:03

## 2019-04-02 RX ADMIN — HEPARIN SODIUM 5000 UNITS: 5000 INJECTION INTRAVENOUS; SUBCUTANEOUS at 05:53

## 2019-04-02 RX ADMIN — LEVETIRACETAM 500 MG: 5 INJECTION INTRAVENOUS at 08:41

## 2019-04-02 RX ADMIN — PANTOPRAZOLE SODIUM 40 MG: 40 INJECTION, POWDER, FOR SOLUTION INTRAVENOUS at 05:54

## 2019-04-02 ASSESSMENT — PAIN DESCRIPTION - FREQUENCY: FREQUENCY: CONTINUOUS

## 2019-04-02 ASSESSMENT — PAIN - FUNCTIONAL ASSESSMENT: PAIN_FUNCTIONAL_ASSESSMENT: ACTIVITIES ARE NOT PREVENTED

## 2019-04-02 ASSESSMENT — PAIN SCALES - GENERAL
PAINLEVEL_OUTOF10: 0
PAINLEVEL_OUTOF10: 0
PAINLEVEL_OUTOF10: 8
PAINLEVEL_OUTOF10: 0

## 2019-04-02 ASSESSMENT — PAIN DESCRIPTION - LOCATION: LOCATION: BACK

## 2019-04-02 ASSESSMENT — PAIN DESCRIPTION - DESCRIPTORS: DESCRIPTORS: ACHING

## 2019-04-02 ASSESSMENT — PAIN DESCRIPTION - ONSET: ONSET: ON-GOING

## 2019-04-02 ASSESSMENT — PAIN DESCRIPTION - PROGRESSION: CLINICAL_PROGRESSION: NOT CHANGED

## 2019-04-02 ASSESSMENT — PAIN DESCRIPTION - ORIENTATION: ORIENTATION: UPPER;LOWER

## 2019-04-02 ASSESSMENT — PAIN DESCRIPTION - PAIN TYPE: TYPE: ACUTE PAIN

## 2019-04-02 NOTE — PROGRESS NOTES
Advised MD of patients low BS this shift.  Electronically signed by Noel Guerrero RN on 4/2/2019 at 2:48 PM

## 2019-04-02 NOTE — PLAN OF CARE
Problem: Falls - Risk of:  Goal: Will remain free from falls  Description  Will remain free from falls  Outcome: Ongoing  Fall risk precautions in place. Bed in lowest position with wheels locked,bed alarm in place and activated, non-skid socks on pt, fall risk ID on pt, call light in reach, pt encouraged to call before getting out of bed and for any other needs or complaints.       Problem: Self-Care:  Goal: Ability to participate in self-care as condition permits will improve  Description  Ability to participate in self-care as condition permits will improve  Outcome: Ongoing

## 2019-04-02 NOTE — PROGRESS NOTES
for UTI. No overt offending meds noted  2) PATRICIA- resolved  3) Leukocytosis NOS-  ? Hemoconcentration. No fever noted. May be reactive from acute pancreatitis. 4) Afib- s/p Watchman's-  On ASA and Plavix  5) UTI- on Cipro     Plan:  Continue supportive care for now- Will decrease IV fluids to 100ml/hr  Will have to determine repeat imaging in 4-6 weeks. Will need to consider MRCP vs Pancreatic protocol CT (in 4-6 weeks if Creatinine improves). Likely will defer EUS in light of her advanced age and sedation risks  Advance diet to low fat as tolerated   Likely can d/c back to NH if improved in next several days. Thank you for allowing me to participate in the care of your patient. Please feel free to contact me with any concerns.   [address] [place] [address] Madi Tequila, DO

## 2019-04-02 NOTE — CARE COORDINATION
PT/OT recommending SNF level of care. Sw spoke to patient's daughter Eugenio Smith, informed her of recommendations for SNF. She is agreeable to this as her mother lives alone and has cognition issues. She was in [place] previously, would prefer this facility. Dominique left message with [phone]), admissions at [facility] [place], await return call.      Callie Abdi, [address] [facility]  [phone]  4/2/19 at 12:24 PM

## 2019-04-02 NOTE — PROGRESS NOTES
Hospitalist Progress Note      PCP: Cherylene Bergamo, MD    Date of Admission: 3/31/2019    Chief Complaint: altered mental status    Subjective: Pt seen and examined. States her abdominal pain has improved. Tolerating a diet. Medications:  Reviewed    Infusion Medications    lactated ringers 150 mL/hr at 04/02/19 0300     Scheduled Medications    sodium chloride flush  10 mL Intravenous 2 times per day    heparin (porcine)  5,000 Units Subcutaneous 3 times per day    levetiracetam  500 mg Intravenous Q12H    pantoprazole  40 mg Intravenous QAM AC    ciprofloxacin  400 mg Intravenous Q24H    aspirin  81 mg Oral Daily    clopidogrel  75 mg Oral Daily     PRN Meds: sodium chloride flush, ondansetron, bisacodyl, morphine, acetaminophen, labetalol      Intake/Output Summary (Last 24 hours) at 4/2/2019 1441  Last data filed at 4/2/2019 0930  Gross per 24 hour   Intake 2087.5 ml   Output 1100 ml   Net 987.5 ml       Exam:    BP 95/60   Pulse 82   Temp 97 °F (36.1 °C) (Axillary)   Resp 16   Ht 4' 11\" (1.499 m)   Wt 116 lb 13.5 oz (53 kg)   SpO2 96%   BMI 23.60 kg/m²     General appearance: No apparent distress, appears stated age and cooperative. HEENT: Pupils equal, round, and reactive to light. Conjunctivae/corneas clear. Neck: Supple, with full range of motion. No jugular venous distention. Trachea midline. Respiratory:  Normal respiratory effort. Clear to auscultation, bilaterally without Rales/Wheezes/Rhonchi. Cardiovascular: Regular rate and rhythm with normal S1/S2 without murmurs, rubs or gallops. Abdomen: Soft, non-tender, non-distended with normal bowel sounds. Musculoskeletal: No clubbing, cyanosis or edema bilaterally. Full range of motion without deformity. Skin: Skin color, texture, turgor normal.  No rashes or lesions. Neurologic:  Neurovascularly intact without any focal sensory/motor deficits.  Cranial nerves: II-XII intact, grossly non-focal.  Psychiatric: Alert and oriented, thought content appropriate, normal insight  Capillary Refill: Brisk,< 3 seconds   Peripheral Pulses: +2 palpable, equal bilaterally       Labs:   Recent Labs     03/31/19 2113 04/01/19 0825 04/02/19 0528   WBC 21.4* 21.4* 19.3*   HGB 15.8 14.1 12.9   HCT 48.9* 43.3 40.8    205 159     Recent Labs     03/31/19 2113 04/01/19 0825 04/02/19 0528 04/02/19  1340    135* 132*  --    K 4.9 4.7 5.5* 4.3   CL 96* 100 100  --    CO2 24 22 19*  --    BUN 70* 66* 39*  --    CREATININE 1.7* 1.6* 1.0  --    CALCIUM 10.0 9.4 9.0  --      Recent Labs     03/31/19 2113 04/01/19 0825 04/02/19 0528   AST 21 19 28   ALT 15 13 13   BILITOT 0.4 0.5 0.5   ALKPHOS 77 63 62     No results for input(s): INR in the last 72 hours. Recent Labs     03/31/19 2113   TROPONINI <0.01       Urinalysis:      Lab Results   Component Value Date    NITRU Negative 03/31/2019    WBCUA 13 02/28/2019    BACTERIA RARE 02/28/2019    RBCUA 1 02/28/2019    BLOODU Negative 03/31/2019    SPECGRAV 1.022 03/31/2019    GLUCOSEU Negative 03/31/2019       Radiology:  [address]   Final Result   No cholelithiasis or ancillary evidence of acute cholecystitis. CT CHEST ABDOMEN PELVIS WO CONTRAST   Final Result   1. Findings suggesting mild acute pancreatitis at pancreatic head. Correlate   with serum amylase/lipase levels. 2. No CT chest finding to account for radiographic finding with exception of   a focal area of scarring in the lingula left upper lobe which may calcific or   part of the finding. No acute pulmonary disease evident. 3. Cardiomegaly. 4. Calcific atherosclerosis aorta and its branches. CT Head WO Contrast   Final Result   No acute intracranial abnormality. Interval development of findings typical of acute sinusitis bilateral   maxillary sinuses, bilateral ethmoid air cells and left sphenoid sinus. Senescent cerebral changes. XR CHEST PORTABLE   Final Result   1. Indeterminate 2 cm rounded opacity central to medial lower left chest   concerning for new mass. 2. Calcific atherosclerosis aorta. 3. Cardiomegaly. RECOMMENDATION:   IV contrast-enhanced CT chest                 Assessment/Plan:    Active Hospital Problems    Diagnosis Date Noted    Moderate malnutrition ([address] [E44.0] 04/01/2019    Altered mental status [R41.82] 03/31/2019          Pancreatitis, acute   Etiology unclear. No epigastric pain but elevated lipase with mild pancreatitis on CT abdomen. ? Medication-induced. LFTs and Bili levels are normal, which would argue against Choledocholithiasis as a cause. TGs WNL  Hold HCTZ  Full Liquid Diet  IVF with LR  GI consulted, recs appreciated; may need follow-up imaging to evaluate for occult malignancy     SHANTEL on CKD 2 - resolved  Cr baseline 1.1-1.3  Prerenal, due to dehydration  IVF  Avoid nephrotoxic medications - Hold lisinopril  Renally dose medications  Monitor for electrolyte abnormalities  Check: UA/CS, Yasmine/UCr, uric acid, TSH, U osmolality     A fib  Rate controlled  S/p watchman  Resume ASA, plavix  IV Labetolol PRN     Acute hypoxic respiratory failure, POA  With criteria: < 92% on RA, RR> 18, etiology unclear. Possibly A fib. Slowly being weaned off O2 and tolerating decrease  Chest CT negative for acute process  Supplemental 02 as necessary to keep SaO2 > 90%, not on O2 at home      Leukocytosis  Etiology unclear - pancreatitis vs UTI vs sinus infection  Will monitor     HTN  Hold home lisinopril and HCTZ d/t SHANTEL and pancreatitis respectively   IV Labetalol PRN     E coli UTI  Augmentin started 3/29/19. Changed to IV cipro while NPO     Sinus infection  Head CT suggestive of sinus infection. Patient reports ongoing cough, congestion, PND, runny nose SOB for the last month which has improved the last few days. Patient was started on augmentin for UTI on 3/29/19 which was changed to cipro on admission d/t pancreatitis/NPO.  Would consider

## 2019-04-02 NOTE — PROGRESS NOTES
Physical Therapy    Facility/Department: [facility]  Initial Assessment    NAME: Genevieve Hicks  : 1930  MRN: [id number]    Date of Service: 2019    Discharge Recommendations:  Patient would benefit from continued therapy after discharge, 3-5 sessions per week   PT Equipment Recommendations  Equipment Needed: No  Genevieve Hicks scored a 16/24 on the AM-PAC short mobility form. Current research shows that an AM-PAC score of 17 or less is typically not associated with a discharge to the patient's home setting. Based on the patients AM-PAC score and their current functional mobility deficits, it is recommended that the patient have 3-5 sessions per week of Physical Therapy at d/c to increase the patients independence. Assessment   Body structures, Functions, Activity limitations: Decreased functional mobility   Assessment: pt is an 79 yo female who was adm to [facility] with acute encephalopathy (MRI negatove for CVA), chronic A-fib, chronic back pain and elevated troponins; pt currently below her baseline of being [place] at home; feel pt will benefit from continued therapy at discharge to address deficits and assist pt in regaining her max potential  Prognosis: Good  Decision Making: Medium Complexity  Patient Education: role and purpose of PT; reviewed call light and not getting up without assist  Barriers to Learning: confusion/forgetful  REQUIRES PT FOLLOW UP: Yes  Activity Tolerance  Activity Tolerance: Patient Tolerated treatment well;Patient limited by endurance  Activity Tolerance: pt fell asleep in chair as soon as pt positioned for comfort       Patient Diagnosis(es): The primary encounter diagnosis was Abnormal CXR. Diagnoses of Leukocytosis, unspecified type, Abnormal serum level of lipase, Confusion, Acute maxillary sinusitis, recurrence not specified, and Hypoxia were also pertinent to this visit.      has a past medical history of Atrial fibrillation ([facility] [address] Carpal tunnel syndrome, RLE  Strength RLE: WFL  Strength LLE  Strength LLE: WFL     Sensation  Overall Sensation Status: WFL  Bed mobility  Supine to Sit: Minimal assistance; Moderate assistance  Sit to Supine: Unable to assess(Up in chair at end of session)  Transfers  Sit to Stand: Minimal Assistance  Stand to sit: Minimal Assistance  Ambulation  Ambulation?: Yes  More Ambulation?: Yes  Ambulation 1  Surface: level tile  Device: No Device  Assistance: Minimal assistance; Moderate assistance;2 Person assistance  Quality of Gait: pt very unsteady; crosses feet at times  Distance: 20'  Comments: pt used commode  Ambulation 2  Surface - 2: level tile  Device 2: Rolling Walker  Assistance 2: Minimal assistance  Quality of Gait 2: more steady with walker but continues to be slightly unsteady; needed therapist to manage IV pole  Distance: 20'     Balance  Comments: SBA for sitting balance; CGA/min A for standing with walker        Plan   Plan  Times per week: 3-5  Current Treatment Recommendations: Functional Mobility Training  Safety Devices  Type of devices: Call light within reach, Chair alarm in place, Left in chair, Nurse notified    G-Code       OutComes Score                                                  -PAC Score  AM-PAC Inpatient Mobility Raw Score : 16  AM-PAC Inpatient T-Scale Score : 40.78  Mobility Inpatient CMS 0-100% Score: 54.16  Mobility Inpatient CMS G-Code Modifier : CK          Goals  Short term goals  Time Frame for Short term goals: by discharge  Short term goal 1: bed mob MI  Short term goal 2: transfers SBA  Short term goal 3: amb ' with LRAD SBA  Short term goal 4: negotiate stairs when able  Patient Goals   Patient goals : pt wants to be able to get home       Therapy Time   Individual Concurrent Group Co-treatment   Time In 1050         Time Out 1135         Minutes 45              If patient is discharged prior to the next physical therapy visit;  Please see last written PT note for discharge status.     Elana Caryn, PT, [phone]    ILANA BUCIO, PT

## 2019-04-02 NOTE — PROGRESS NOTES
obtained from previous hospitalization d/t decreased cognition. PTA pt lives at home alone in one story home with 2 DASHAWN. Pt reports independence in self-care and light homemaking responsibilities. Pt reports no use of AD for functional mobility. Currently, pt presents with general weakness in UEs. Pt completed bed mobility with min/mod A and required minAx2 for sit <> stand. Pt completed functional mobility with HHA with mod A and CGA/min A with use of RW. Pt required assist for LB dressing and toileting this date. Anticipate pt to require mod A for ADL needs at this time. Pt is unsafe to return home at this time and would benefit from continued therapy to increase mobility and self-care abilities. Treatment Diagnosis: impaired func mob, transfers, and ADL status   Prognosis: Good;Fair  Decision Making: Medium Complexity  History: PMH: Afib, CHF, MI, cataracts  Exam: ADLs, transfers, fuc mob  Assistance / Modification: min A for mobility, mod A for ADLs  Patient Education: Role of OT, POC, safety   REQUIRES OT FOLLOW UP: Yes  Activity Tolerance  Activity Tolerance: Treatment limited secondary to decreased cognition;Patient limited by fatigue  Safety Devices  Safety Devices in place: Yes(RN Uma Dunham) notified)  Type of devices: Call light within reach; Chair alarm in place;Gait belt;Left in chair;Nurse notified           Patient Diagnosis(es): The primary encounter diagnosis was Abnormal CXR. Diagnoses of Leukocytosis, unspecified type, Abnormal serum level of lipase, Confusion, Acute maxillary sinusitis, recurrence not specified, and Hypoxia were also pertinent to this visit. has a past medical history of Atrial fibrillation ([address] Carpal tunnel syndrome, bilateral, Cataract, CHF (congestive heart failure) ([address] Hyperlipidemia, Hypertension, and MI (myocardial infarction) ([address] has a past surgical history that includes Total knee arthroplasty (Bilateral); Spine surgery;  Carpal tunnel release; eye surgery; Cataract removal; and other surgical history (07/19/2017). Treatment Diagnosis: impaired func mob, transfers, and ADL status       Restrictions  Restrictions/Precautions  Restrictions/Precautions: Fall Risk  Position Activity Restriction  Other position/activity restrictions: Full clear liquid diet, IV attached    Subjective   General  Chart Reviewed: Yes  Patient assessed for rehabilitation services?: Yes  Additional Pertinent Hx: Alfredo Rothman PA-C 3/31/19: Pt is \"80 y.o. female with a PMH of CKD, A fib, HTN who presented to ED with complaint of AMS. She is accompanied by her family. They reports she was discharged from nursing home on 3/21/19. She was her normal self until 3/25/19 when she became acutely confused, so they took her to her PCP the next day. UA was collected which was positive for E coli. She was started on augmentin for UTI on 3/29/19. However, patient remained confused despite several days of antibiotic treatment and told family she felt very badly and was afraid she might die. So family called 46 to bring patient to ED for further evaluation. \"  Family / Caregiver Present: No  Referring Practitioner: Marielos Hutton MD   Diagnosis: Altered mental status   Subjective  Subjective: Pt met bedside, agreeable for OT/PT evaluation and OOB activity.    Pain Assessment  Pain Assessment: 0-10  Pain Level: 0  Patient's Stated Pain Goal: No pain  Pain Type: Chronic pain  Pain Location: Generalized  Pain Orientation: Other (Comment)(All over)  Pain Radiating Towards: no radiation  Pain Descriptors: Aching, Discomfort  Pain Frequency: Continuous  Pain Onset: Unable to tell  Clinical Progression: Gradually worsening  Functional Pain Assessment: Prevents or interferes with many active not passive activities  Non-Pharmaceutical Pain Intervention(s): Relaxation techniques  Response to Pain Intervention: Asleep with RR greater than 10     Social/Functional History  Social/Functional History  Lives With: Alone  Type of Home: House  Home Layout: Laundry in basement, Able to Live on Main level with bedroom/bathroom  Home Access: Stairs to enter without rails  Entrance Stairs - Number of Steps: 2 DASHAWN with post to hold onto  Bathroom Shower/Tub: Tub/Shower unit  [employer]: Standard  Bathroom Equipment: Grab bars in shower  Home Equipment: Rolling walker(hurry cane)  ADL Assistance: Independent  Homemaking Assistance: Independent  Ambulation Assistance: Independent(unsure if pt uses AD; she reports she does not)  Transfer Assistance: Independent  Additional Comments: unsure of accuracy from above information as pt having delayed processing and slightly confused (some of info from pt and from previous hospital notes)       Objective   Hearing: Exceptions to [facility]  Hearing Exceptions: Hard of hearing/hearing concerns      Orientation  Overall Orientation Status: Impaired  Orientation Level: Oriented to person(Partial place and partial situation)     Balance  Sitting Balance: Stand by assistance  Standing Balance: Minimal assistance  Standing Balance  Time: ~2-3 minutes  Activity: Func mob, transfers, ADLs  Sit to stand: 2 Person assistance  Stand to sit: 2 Person assistance    Functional Mobility  Functional - Mobility Device: Rolling Walker  Activity: To/from bathroom  Assist Level: Minimal assistance  Functional Mobility Comments: Pt initially completing functional mobility to bathroom with no device with bilateral hand held assist with mod A. Pt progressed to CGA/min A with RW back from bathroom to recliner chair. Toilet Transfers  Toilet - Technique: Ambulating  Equipment Used: Grab bars(Comfort height commode)  Toilet Transfer: Minimal assistance  Toilet Transfers Comments: Min A for sit <> stand from commode with use of grab bar     ADL  Toileting: (Assist for pericare and clothing management up)  Additional Comments: Per Pt report PTA pt completed self-care tasks independently.  Anticipate pt to require min/mod A for bathing/dressing/toileting, setup for grooming/feeding. Tone RUE  RUE Tone: Normotonic  Tone LUE  LUE Tone: Normotonic  Coordination  Movements Are Fluid And Coordinated: Yes     Bed mobility  Supine to Sit: Minimal assistance; Moderate assistance  Sit to Supine: Unable to assess(Up in chair at end of session)     Transfers  Sit to stand: 2 Person assistance  Stand to sit: 2 Person assistance  Transfer Comments: Min Ax2 for sit <> stand from EOB to HHA and sit to recliner chair with verbal cueing      Cognition  Overall Cognitive Status: Exceptions  Arousal/Alertness: Delayed responses to stimuli  Following Commands: Follows one step commands with increased time; Follows one step commands with repetition  Attention Span: Attends with cues to redirect  Memory: Decreased recall of recent events  Safety Judgement: Decreased awareness of need for assistance;Decreased awareness of need for safety  Problem Solving: Assistance required to generate solutions;Assistance required to implement solutions  Insights: Decreased awareness of deficits  Initiation: Requires cues for some  Sequencing: Requires cues for some        Sensation  Overall Sensation Status: WFL        LUE AROM (degrees)  LUE AROM : WFL  LUE General AROM: Limitations in shoulder ROM, distal UE WFL   Left Hand AROM (degrees)  Left Hand AROM: WFL  RUE AROM (degrees)  RUE AROM : WFL  RUE General AROM: Limitations in shoulder ROM, distal UE WFL   Right Hand AROM (degrees)  Right Hand AROM: WFL     LUE Strength  LUE Strength Comment: Not formally assessed - anticipate general weakness   RUE Strength  RUE Strength Comment: Not formally assessed - anticipate general weakness           Plan   Plan  Times per week: 3-5  Times per day: Daily  Current Treatment Recommendations: Strengthening, Functional Mobility Training, Endurance Training, Balance Training, Safety Education & Training, Equipment Evaluation, Education, & procurement, Patient/Caregiver Education & Training, Self-Care / ADL      AM-PAC Score    Jose Alfredo Sánchez scored a 16/24 on the AM-PAC ADL Inpatient form. Current research shows that an AM-PAC score of 17 or less is typically not associated with a discharge to the patient's home setting. Based on the patients AM-PAC score and their current ADL deficits, it is recommended that the patient have 3-5 sessions per week of Occupational Therapy at d/c to increase the patients independence. AM-PAC Inpatient Daily Activity Raw Score: 16  AM-PAC Inpatient ADL T-Scale Score : 35.96  ADL Inpatient CMS 0-100% Score: 53.32  ADL Inpatient CMS G-Code Modifier : CK    Goals  Short term goals  Time Frame for Short term goals: prior to d/c  Short term goal 1: Pt will complete functional mobility and transfers with SBA  Short term goal 2: Pt will complete bathing wit min A  Short term goal 3: Pt will complete dressing with min A  Short term goal 4: Pt will complete toileting with min A  Short term goal 5: Pt will complete grooming with setup   Patient Goals   Patient goals : Pt did not state goal at this time d/t decreased cognition - Pt reports \"I just want to get out of bed, I have been lying here for several days\"       Therapy Time   Individual Concurrent Group Co-treatment   Time In       1050   Time Out       1130   Minutes       40   Timed Code Treatment Minutes: 25 Minutes(15 min eval )     If pt is discharged prior to next OT session, this note will serve as the discharge summary.     Maxx Rivera, OTR/L #[id number]

## 2019-04-02 NOTE — PLAN OF CARE
Patient is alert and oriented, up with assist with walker, call light within reach. Fall precautions in place. AM meds complete, patient tolerated well. VSS and WDL. No s/s of distress, no further needs noted at this time.  Electronically signed by Kev Arellano RN on 4/2/2019 at 12:19 PM

## 2019-04-03 LAB
A/G RATIO: 0.9 (ref 1.1–2.2)
ALBUMIN SERPL-MCNC: 2.9 G/DL (ref 3.4–5)
ALP BLD-CCNC: 69 U/L (ref 40–129)
ALT SERPL-CCNC: 13 U/L (ref 10–40)
ANION GAP SERPL CALCULATED.3IONS-SCNC: 11 MMOL/L (ref 3–16)
AST SERPL-CCNC: 28 U/L (ref 15–37)
BASOPHILS ABSOLUTE: 0 K/UL (ref 0–0.2)
BASOPHILS RELATIVE PERCENT: 0.3 %
BILIRUB SERPL-MCNC: 0.5 MG/DL (ref 0–1)
BUN BLDV-MCNC: 23 MG/DL (ref 7–20)
CALCIUM SERPL-MCNC: 9 MG/DL (ref 8.3–10.6)
CHLORIDE BLD-SCNC: 103 MMOL/L (ref 99–110)
CO2: 24 MMOL/L (ref 21–32)
CREAT SERPL-MCNC: 0.9 MG/DL (ref 0.6–1.2)
EKG ATRIAL RATE: 98 BPM
EKG DIAGNOSIS: NORMAL
EKG Q-T INTERVAL: 368 MS
EKG QRS DURATION: 104 MS
EKG QTC CALCULATION (BAZETT): 467 MS
EKG R AXIS: -55 DEGREES
EKG T AXIS: 96 DEGREES
EKG VENTRICULAR RATE: 97 BPM
EOSINOPHILS ABSOLUTE: 0.1 K/UL (ref 0–0.6)
EOSINOPHILS RELATIVE PERCENT: 0.6 %
GFR AFRICAN AMERICAN: >60
GFR NON-AFRICAN AMERICAN: 59
GLOBULIN: 3.3 G/DL
GLUCOSE BLD-MCNC: 69 MG/DL (ref 70–99)
GLUCOSE BLD-MCNC: 73 MG/DL (ref 70–99)
GLUCOSE BLD-MCNC: 92 MG/DL (ref 70–99)
HCT VFR BLD CALC: 40 % (ref 36–48)
HEMOGLOBIN: 12.9 G/DL (ref 12–16)
LIPASE: 72 U/L (ref 13–60)
LYMPHOCYTES ABSOLUTE: 2.8 K/UL (ref 1–5.1)
LYMPHOCYTES RELATIVE PERCENT: 15.2 %
MCH RBC QN AUTO: 30.7 PG (ref 26–34)
MCHC RBC AUTO-ENTMCNC: 32.4 G/DL (ref 31–36)
MCV RBC AUTO: 94.9 FL (ref 80–100)
MONOCYTES ABSOLUTE: 1.5 K/UL (ref 0–1.3)
MONOCYTES RELATIVE PERCENT: 8.3 %
NEUTROPHILS ABSOLUTE: 13.9 K/UL (ref 1.7–7.7)
NEUTROPHILS RELATIVE PERCENT: 75.6 %
PDW BLD-RTO: 13.6 % (ref 12.4–15.4)
PERFORMED ON: ABNORMAL
PERFORMED ON: NORMAL
PLATELET # BLD: 203 K/UL (ref 135–450)
PMV BLD AUTO: 9.3 FL (ref 5–10.5)
POTASSIUM REFLEX MAGNESIUM: 4.1 MMOL/L (ref 3.5–5.1)
RBC # BLD: 4.21 M/UL (ref 4–5.2)
SODIUM BLD-SCNC: 138 MMOL/L (ref 136–145)
TOTAL PROTEIN: 6.2 G/DL (ref 6.4–8.2)
WBC # BLD: 18.3 K/UL (ref 4–11)

## 2019-04-03 PROCEDURE — 97535 SELF CARE MNGMENT TRAINING: CPT

## 2019-04-03 PROCEDURE — 80053 COMPREHEN METABOLIC PANEL: CPT

## 2019-04-03 PROCEDURE — 6370000000 HC RX 637 (ALT 250 FOR IP): Performed by: INTERNAL MEDICINE

## 2019-04-03 PROCEDURE — 83690 ASSAY OF LIPASE: CPT

## 2019-04-03 PROCEDURE — 94760 N-INVAS EAR/PLS OXIMETRY 1: CPT

## 2019-04-03 PROCEDURE — 2580000003 HC RX 258: Performed by: INTERNAL MEDICINE

## 2019-04-03 PROCEDURE — 6360000002 HC RX W HCPCS: Performed by: PHYSICIAN ASSISTANT

## 2019-04-03 PROCEDURE — 97116 GAIT TRAINING THERAPY: CPT | Performed by: PHYSICAL THERAPIST

## 2019-04-03 PROCEDURE — 36415 COLL VENOUS BLD VENIPUNCTURE: CPT

## 2019-04-03 PROCEDURE — 1200000000 HC SEMI PRIVATE

## 2019-04-03 PROCEDURE — 85025 COMPLETE CBC W/AUTO DIFF WBC: CPT

## 2019-04-03 PROCEDURE — 97530 THERAPEUTIC ACTIVITIES: CPT

## 2019-04-03 PROCEDURE — 97530 THERAPEUTIC ACTIVITIES: CPT | Performed by: PHYSICAL THERAPIST

## 2019-04-03 PROCEDURE — C9113 INJ PANTOPRAZOLE SODIUM, VIA: HCPCS | Performed by: PHYSICIAN ASSISTANT

## 2019-04-03 RX ORDER — DEXTROSE MONOHYDRATE 25 G/50ML
50 INJECTION, SOLUTION INTRAVENOUS ONCE
Status: COMPLETED | OUTPATIENT
Start: 2019-04-03 | End: 2019-04-03

## 2019-04-03 RX ADMIN — DEXTROSE MONOHYDRATE 50 ML: 25 INJECTION, SOLUTION INTRAVENOUS at 09:44

## 2019-04-03 RX ADMIN — HEPARIN SODIUM 5000 UNITS: 5000 INJECTION INTRAVENOUS; SUBCUTANEOUS at 06:16

## 2019-04-03 RX ADMIN — MORPHINE SULFATE 2 MG: 2 INJECTION, SOLUTION INTRAMUSCULAR; INTRAVENOUS at 00:55

## 2019-04-03 RX ADMIN — HEPARIN SODIUM 5000 UNITS: 5000 INJECTION INTRAVENOUS; SUBCUTANEOUS at 14:49

## 2019-04-03 RX ADMIN — PANTOPRAZOLE SODIUM 40 MG: 40 INJECTION, POWDER, FOR SOLUTION INTRAVENOUS at 06:16

## 2019-04-03 RX ADMIN — ONDANSETRON 4 MG: 2 INJECTION INTRAMUSCULAR; INTRAVENOUS at 00:56

## 2019-04-03 RX ADMIN — CLOPIDOGREL BISULFATE 75 MG: 75 TABLET ORAL at 09:40

## 2019-04-03 RX ADMIN — CIPROFLOXACIN 400 MG: 2 INJECTION, SOLUTION INTRAVENOUS at 00:40

## 2019-04-03 RX ADMIN — MORPHINE SULFATE 2 MG: 2 INJECTION, SOLUTION INTRAMUSCULAR; INTRAVENOUS at 09:44

## 2019-04-03 RX ADMIN — SODIUM CHLORIDE, POTASSIUM CHLORIDE, SODIUM LACTATE AND CALCIUM CHLORIDE: 600; 310; 30; 20 INJECTION, SOLUTION INTRAVENOUS at 04:38

## 2019-04-03 RX ADMIN — SODIUM CHLORIDE, POTASSIUM CHLORIDE, SODIUM LACTATE AND CALCIUM CHLORIDE: 600; 310; 30; 20 INJECTION, SOLUTION INTRAVENOUS at 17:24

## 2019-04-03 RX ADMIN — HEPARIN SODIUM 5000 UNITS: 5000 INJECTION INTRAVENOUS; SUBCUTANEOUS at 21:53

## 2019-04-03 RX ADMIN — ASPIRIN 81 MG 81 MG: 81 TABLET ORAL at 09:39

## 2019-04-03 RX ADMIN — LEVETIRACETAM 500 MG: 5 INJECTION INTRAVENOUS at 20:41

## 2019-04-03 RX ADMIN — LEVETIRACETAM 500 MG: 5 INJECTION INTRAVENOUS at 09:43

## 2019-04-03 ASSESSMENT — PAIN SCALES - GENERAL
PAINLEVEL_OUTOF10: 8
PAINLEVEL_OUTOF10: 0
PAINLEVEL_OUTOF10: 8
PAINLEVEL_OUTOF10: 8
PAINLEVEL_OUTOF10: 3
PAINLEVEL_OUTOF10: 0
PAINLEVEL_OUTOF10: 0

## 2019-04-03 ASSESSMENT — PAIN DESCRIPTION - LOCATION
LOCATION: ABDOMEN
LOCATION: ABDOMEN

## 2019-04-03 ASSESSMENT — PAIN DESCRIPTION - PROGRESSION
CLINICAL_PROGRESSION: GRADUALLY WORSENING
CLINICAL_PROGRESSION: NOT CHANGED

## 2019-04-03 ASSESSMENT — PAIN DESCRIPTION - FREQUENCY
FREQUENCY: CONTINUOUS
FREQUENCY: CONTINUOUS

## 2019-04-03 ASSESSMENT — PAIN DESCRIPTION - PAIN TYPE
TYPE: ACUTE PAIN
TYPE: ACUTE PAIN

## 2019-04-03 ASSESSMENT — PAIN DESCRIPTION - DESCRIPTORS
DESCRIPTORS: ACHING
DESCRIPTORS: ACHING;DISCOMFORT

## 2019-04-03 ASSESSMENT — PAIN - FUNCTIONAL ASSESSMENT: PAIN_FUNCTIONAL_ASSESSMENT: ACTIVITIES ARE NOT PREVENTED

## 2019-04-03 ASSESSMENT — PAIN DESCRIPTION - ORIENTATION
ORIENTATION: MID
ORIENTATION: UPPER;MID

## 2019-04-03 ASSESSMENT — PAIN DESCRIPTION - ONSET
ONSET: ON-GOING
ONSET: AWAKENED FROM SLEEP

## 2019-04-03 NOTE — PROGRESS NOTES
Occupational Therapy  Facility/Department: [facility] 5W PROGRESSIVE CARE  Daily Treatment Note  NAME: Jorge Tomas  : 1930  MRN: [id number]    Date of Service: 4/3/2019    Discharge Recommendations:  Continue to assess pending progress, Home with Home health OT       Assessment   Performance deficits / Impairments: Decreased functional mobility ; Decreased strength;Decreased endurance;Decreased ADL status; Decreased balance;Decreased cognition  Assessment: Discussed with OTR: Pt tolerated session fair this date. Pt limited by decreased activity tolerance, endurance and cognition. Pt required up to Min/Mod A for ADL's and CGA for fx'l transfers, mob. Pt easily SOB and desated briefly to 80's with activity (on room air and quickly increased to >90%. Pt's ampac score reflects d/c home. Will cont with POC and assess for d/c. If pt returns home, recommend use of BSC by bed at night. Treatment Diagnosis: impaired func mob, transfers, and ADL status   Prognosis: Fair;Good  Patient Education: Role of OT, POC, safety   REQUIRES OT FOLLOW UP: Yes  Activity Tolerance  Activity Tolerance: Patient limited by fatigue;Treatment limited secondary to decreased cognition  Safety Devices  Safety Devices in place: Yes(RNDestinee)  Type of devices: Call light within reach; Chair alarm in place;Gait belt;Left in chair;Nurse notified         Patient Diagnosis(es): The primary encounter diagnosis was Abnormal CXR. Diagnoses of Leukocytosis, unspecified type, Abnormal serum level of lipase, Confusion, Acute maxillary sinusitis, recurrence not specified, and Hypoxia were also pertinent to this visit. has a past medical history of Atrial fibrillation ([address] Carpal tunnel syndrome, bilateral, Cataract, CHF (congestive heart failure) ([address] Hyperlipidemia, Hypertension, and MI (myocardial infarction) ([address] has a past surgical history that includes Total knee arthroplasty (Bilateral); Spine surgery;  Carpal tunnel release; eye

## 2019-04-03 NOTE — PROGRESS NOTES
INPATIENT CONSULTATION:    IDENTIFYING DATA/REASON FOR CONSULTATION   PATIENT:  Christine Lai  MRN:  [id number]  ADMIT DATE: 3/31/2019  TIME OF EVALUATION: 4/3/2019 1:30 PM  HOSPITAL STAY:   LOS: 3 days   CONSULTING PHYSICIAN: Mickie Juarez MD   REASON FOR CONSULTATION:    Subjective:    Patient reports she is feeling better. She tolerated full liquids yesterday without pain, nausea or vomiting. No fever or chills    MEDICATIONS   SCHEDULED:    sodium chloride flush 10 mL 2 times per day   heparin (porcine) 5,000 Units 3 times per day   levetiracetam 500 mg Q12H   pantoprazole 40 mg QAM AC   ciprofloxacin 400 mg Q24H   aspirin 81 mg Daily   clopidogrel 75 mg Daily     FLUIDS/DRIPS:     lactated ringers 100 mL/hr at 04/03/19 0438     PRNs:   sodium chloride flush 10 mL PRN   ondansetron 4 mg Q6H PRN   bisacodyl 10 mg Daily PRN   morphine 2 mg Q3H PRN   acetaminophen 650 mg Q4H PRN   labetalol 10 mg Q6H PRN     ALLERGIES:    Allergies   Allergen Reactions    Elena Advanced Aspirin Ex St [Aspirin] Other (See Comments)     Elena back and body  Off balance and deaf         PHYSICAL EXAM     Vitals:    04/03/19 0830 04/03/19 0909 04/03/19 0915 04/03/19 1037   BP: 110/68   100/66   Pulse: 96   92   Resp: 21 20 26 22   Temp: 98.5 °F (36.9 °C)      TempSrc: Axillary      SpO2: 94% 95%  98%   Weight:       Height:           I/O last 3 completed shifts: In: 0330 [P.O.:360; I.V.:1000; IV Piggyback:400]  Out: 650 [Urine:650]    Physical Exam:  Gen: Resting in bed, NAD   HEENT: normocephalic, atraumatic. No scleral icterus.    CV: RRR no MRG   Pul: CTAB   Abd: Good bowel sounds throughout, soft, NT/ND, no masses, no HSM   Ext: No edema   Neuro: No asterixis   Skin: No jaundice, spider angiomas, mathew erythema     LABS AND IMAGING     Recent Results (from the past 24 hour(s))   Potassium    Collection Time: 04/02/19  1:40 PM   Result Value Ref Range    Potassium 4.3 3.5 - 5.1 mmol/L   POCT Glucose    Collection Time: 04/02/19  2:27 PM   Result Value Ref Range    POC Glucose 64 (L) 70 - 99 mg/dl    Performed on ACCU-CHEK    POCT Glucose    Collection Time: 04/02/19  5:23 PM   Result Value Ref Range    POC Glucose 92 70 - 99 mg/dl    Performed on ACCU-CHEK    POCT Glucose    Collection Time: 04/02/19  8:56 PM   Result Value Ref Range    POC Glucose 97 70 - 99 mg/dl    Performed on ACCU-CHEK    POCT Glucose    Collection Time: 04/03/19  2:08 AM   Result Value Ref Range    POC Glucose 92 70 - 99 mg/dl    Performed on ACCU-CHEK    Lipase    Collection Time: 04/03/19  6:16 AM   Result Value Ref Range    Lipase 72.0 (H) 13.0 - 60.0 U/L   Comprehensive Metabolic Panel w/ Reflex to MG    Collection Time: 04/03/19  6:16 AM   Result Value Ref Range    Sodium 138 136 - 145 mmol/L    Potassium reflex Magnesium 4.1 3.5 - 5.1 mmol/L    Chloride 103 99 - 110 mmol/L    CO2 24 21 - 32 mmol/L    Anion Gap 11 3 - 16    Glucose 73 70 - 99 mg/dL    BUN 23 (H) 7 - 20 mg/dL    CREATININE 0.9 0.6 - 1.2 mg/dL    GFR Non- 59 (A) >60    GFR African American >60 >60    Calcium 9.0 8.3 - 10.6 mg/dL    Total Protein 6.2 (L) 6.4 - 8.2 g/dL    Alb 2.9 (L) 3.4 - 5.0 g/dL    Albumin/Globulin Ratio 0.9 (L) 1.1 - 2.2    Total Bilirubin 0.5 0.0 - 1.0 mg/dL    Alkaline Phosphatase 69 40 - 129 U/L    ALT 13 10 - 40 U/L    AST 28 15 - 37 U/L    Globulin 3.3 g/dL   CBC Auto Differential    Collection Time: 04/03/19  6:16 AM   Result Value Ref Range    WBC 18.3 (H) 4.0 - 11.0 K/uL    RBC 4.21 4.00 - 5.20 M/uL    Hemoglobin 12.9 12.0 - 16.0 g/dL    Hematocrit 40.0 36.0 - 48.0 %    MCV 94.9 80.0 - 100.0 fL    MCH 30.7 26.0 - 34.0 pg    MCHC 32.4 31.0 - 36.0 g/dL    RDW 13.6 12.4 - 15.4 %    Platelets 354 718 - 902 K/uL    MPV 9.3 5.0 - 10.5 fL    Neutrophils % 75.6 %    Lymphocytes % 15.2 %    Monocytes % 8.3 %    Eosinophils % 0.6 %    Basophils % 0.3 %    Neutrophils # 13.9 (H) 1.7 - 7.7 K/uL    Lymphocytes # 2.8 1.0 - 5.1 K/uL    Monocytes # 1.5 (H) 0.0 - 1.3 K/uL    Eosinophils # 0.1 0.0 - 0.6 K/uL    Basophils # 0.0 0.0 - 0.2 K/uL   POCT Glucose    Collection Time: 04/03/19  7:15 AM   Result Value Ref Range    POC Glucose 69 (L) 70 - 99 mg/dl    Performed on ACCU-CHEK      Other Labs    Imaging  US GALLBLADDER RUQ   Final Result   No cholelithiasis or ancillary evidence of acute cholecystitis. CT CHEST ABDOMEN PELVIS WO CONTRAST   Final Result   1. Findings suggesting mild acute pancreatitis at pancreatic head. Correlate   with serum amylase/lipase levels. 2. No CT chest finding to account for radiographic finding with exception of   a focal area of scarring in the lingula left upper lobe which may calcific or   part of the finding. No acute pulmonary disease evident. 3. Cardiomegaly. 4. Calcific atherosclerosis aorta and its branches. CT Head WO Contrast   Final Result   No acute intracranial abnormality. Interval development of findings typical of acute sinusitis bilateral   maxillary sinuses, bilateral ethmoid air cells and left sphenoid sinus. Senescent cerebral changes. XR CHEST PORTABLE   Final Result   1. Indeterminate 2 cm rounded opacity central to medial lower left chest   concerning for new mass. 2. Calcific atherosclerosis aorta. 3. Cardiomegaly. RECOMMENDATION:   IV contrast-enhanced CT chest             Endoscopy      ASSESSMENT AND RECOMMENDATIONS   Lenore Crowell is a 80 y.o. female with PMH Afib, s/p Watchman, CHF, HTN, HLD,  who presented on 3/31/2019 with AMS. We have been consulted regarding pancreatitis. CT chest/abd/pel showed mid acute pancreatitis at the pancreatic head. Lipase of 496. Additional blood work shows leukocytosis and SHANTEL        1. Acute uncomplicated pancreatitis:  Clinically improved. Tolerated full liquids without pain/n/v.  Etiology unknown. US negative for gallstones. Normal TG/Ca.  Non-smoker, no Etoh.  No overt offending meds noted.   First episode.  CT without contrast- No overt masses noted.    2. SHANTEL: improving  3. Leukocytosis: Stable   4. UTI:  On cipro      RECOMMENDATIONS:    Advance diet to low fat. Monitor tolerance  Will have pt follow up with Dr. Saniya Diaz in 1 month to discuss if further work up/imaging warranted. If you have any questions or need any further information, please feel free to contact us [phone]. Thank you for allowing us to participate in the care of [address] [place] [address]. Jose MCMAHON    Attending physician addendum:      I have personally seen and examined the patient, reviewed the patient's medical record and pertinent labs and clinical imaging. I have personally staffed the case with LISANDRO Cordero. I agree with her consultation note, exam findings, assessment and plans  as written above. I have made appropriate modifications and edited her assessment and plan where needed to reflect my impression and plans for this patient. Patient feels better  More alert   No abdominal pain reported    VSS  Exam: Abd: soft NT/ND  CV- RRR    Labs reviewed:  WBC 18K. Lipase 73     ASSESSMENT: 79 yo with hx of atrial fibrillation, UTI, here with      1) Acute uncomplicated pancreatitis-  improved clinically. Lipase 73  Pain improved. New onset at age 80.  CT without contrast showed no complications.  No overt masses noted.  US negative for gallstones. Normal TG/Ca.  Non-smoker, no Etoh.  She does have polypharmacy with recent abx for UTI.  No overt offending meds noted  2) PATRICIA- resolved  3) Leukocytosis NOS-  No fever noted. May be reactive from acute pancreatitis. On Cipro but will D/C  4) Afib- s/p Watchman's-  On ASA and Plavix       Plan:  D/C Cipro as no signs of complicated pancreatitis on non-contrast CT or on exam  Low fat diet  Continue supportive care for now- Will decrease IV fluids to 100ml/hr  Patient can follow up in my office in next 3-4 weeks to discuss further surveillance of her pancreatitis.    Will need to consider MRCP vs Pancreatic protocol CT (can do CT if PATRICIA remains improved)   Likely will defer EUS in light of her advanced age and sedation risks  Likely can d/c back to NH if improved in next several days.          Thank you for allowing me to participate in this patient's care. If there are any questions or concerns regarding this patient, or the plan we have set in place, please feel free to contact me at [phone].      Blu Jose Eduardo, DO

## 2019-04-03 NOTE — PROGRESS NOTES
Hospitalist Progress Note      PCP: Laurita Walls MD    Date of Admission: 3/31/2019    Chief Complaint: altered mental status    Subjective: Pt seen and examined. States her abdominal pain has improved. Tolerating a diet but very little appetite and not eating much. Medications:  Reviewed    Infusion Medications    lactated ringers 100 mL/hr at 04/03/19 0438     Scheduled Medications    sodium chloride flush  10 mL Intravenous 2 times per day    heparin (porcine)  5,000 Units Subcutaneous 3 times per day    levetiracetam  500 mg Intravenous Q12H    pantoprazole  40 mg Intravenous QAM AC    ciprofloxacin  400 mg Intravenous Q24H    aspirin  81 mg Oral Daily    clopidogrel  75 mg Oral Daily     PRN Meds: sodium chloride flush, ondansetron, bisacodyl, morphine, acetaminophen, labetalol      Intake/Output Summary (Last 24 hours) at 4/3/2019 1438  Last data filed at 4/3/2019 1117  Gross per 24 hour   Intake 1540 ml   Output 650 ml   Net 890 ml       Exam:    /66   Pulse 92   Temp 98.5 °F (36.9 °C) (Axillary)   Resp 22   Ht 4' 11\" (1.499 m)   Wt 117 lb 8.1 oz (53.3 kg)   SpO2 98%   BMI 23.73 kg/m²     General appearance: No apparent distress, appears stated age and cooperative. HEENT: Pupils equal, round, and reactive to light. Conjunctivae/corneas clear. Neck: Supple, with full range of motion. No jugular venous distention. Trachea midline. Respiratory:  Normal respiratory effort. Clear to auscultation, bilaterally without Rales/Wheezes/Rhonchi. Cardiovascular: Regular rate and rhythm with normal S1/S2 without murmurs, rubs or gallops. Abdomen: Soft, non-tender, non-distended with normal bowel sounds. Musculoskeletal: No clubbing, cyanosis or edema bilaterally. Full range of motion without deformity. Skin: Skin color, texture, turgor normal.  No rashes or lesions. Neurologic:  Neurovascularly intact without any focal sensory/motor deficits.  Cranial nerves: II-XII intact, grossly non-focal.  Psychiatric: Alert and oriented, thought content appropriate, normal insight  Capillary Refill: Brisk,< 3 seconds   Peripheral Pulses: +2 palpable, equal bilaterally       Labs:   Recent Labs     04/01/19  0825 04/02/19  0528 04/03/19  0616   WBC 21.4* 19.3* 18.3*   HGB 14.1 12.9 12.9   HCT 43.3 40.8 40.0    159 203     Recent Labs     04/01/19  0825 04/02/19  0528 04/02/19  1340 04/03/19  0616   * 132*  --  138   K 4.7 5.5* 4.3 4.1    100  --  103   CO2 22 19*  --  24   BUN 66* 39*  --  23*   CREATININE 1.6* 1.0  --  0.9   CALCIUM 9.4 9.0  --  9.0     Recent Labs     04/01/19  0825 04/02/19  0528 04/03/19  0616   AST 19 28 28   ALT 13 13 13   BILITOT 0.5 0.5 0.5   ALKPHOS 63 62 69     No results for input(s): INR in the last 72 hours. Recent Labs     03/31/19  2113   TROPONINI <0.01       Urinalysis:      Lab Results   Component Value Date    NITRU Negative 03/31/2019    WBCUA 13 02/28/2019    BACTERIA RARE 02/28/2019    RBCUA 1 02/28/2019    BLOODU Negative 03/31/2019    SPECGRAV 1.022 03/31/2019    GLUCOSEU Negative 03/31/2019       Radiology:  [address]   Final Result   No cholelithiasis or ancillary evidence of acute cholecystitis. CT CHEST ABDOMEN PELVIS WO CONTRAST   Final Result   1. Findings suggesting mild acute pancreatitis at pancreatic head. Correlate   with serum amylase/lipase levels. 2. No CT chest finding to account for radiographic finding with exception of   a focal area of scarring in the lingula left upper lobe which may calcific or   part of the finding. No acute pulmonary disease evident. 3. Cardiomegaly. 4. Calcific atherosclerosis aorta and its branches. CT Head WO Contrast   Final Result   No acute intracranial abnormality. Interval development of findings typical of acute sinusitis bilateral   maxillary sinuses, bilateral ethmoid air cells and left sphenoid sinus. Senescent cerebral changes.          XR CHEST PORTABLE   Final Result   1. Indeterminate 2 cm rounded opacity central to medial lower left chest   concerning for new mass. 2. Calcific atherosclerosis aorta. 3. Cardiomegaly. RECOMMENDATION:   IV contrast-enhanced CT chest                 Assessment/Plan:    Active Hospital Problems    Diagnosis Date Noted    Moderate malnutrition ([facility] [address] [E44.0] 04/01/2019    Altered mental status [R41.82] 03/31/2019          Pancreatitis, acute   Etiology unclear. No epigastric pain but elevated lipase with mild pancreatitis on CT abdomen. ? Medication-induced. LFTs and Bili levels are normal, which would argue against Choledocholithiasis as a cause. TGs WNL  Hold HCTZ  Full Liquid Diet  IVF with LR  GI consulted, recs appreciated; may need follow-up imaging to evaluate for occult malignancy     SHANTEL on CKD 2 - resolved  Cr baseline 1.1-1.3  Prerenal, due to dehydration  IVF  Avoid nephrotoxic medications - Hold lisinopril  Renally dose medications  Monitor for electrolyte abnormalities  Check: UA/CS, Yasmine/UCr, uric acid, TSH, U osmolality     A fib  Rate controlled  S/p watchman  Resume ASA, plavix  IV Labetolol PRN     Acute hypoxic respiratory failure, POA  With criteria: < 92% on RA, RR> 18, etiology unclear. Possibly A fib. Slowly being weaned off O2 and tolerating decrease  Chest CT negative for acute process  Supplemental 02 as necessary to keep SaO2 > 90%, not on O2 at home      Leukocytosis  Etiology unclear - pancreatitis vs UTI vs sinus infection  Will monitor     HTN  Hold home lisinopril and HCTZ d/t SHANTEL and pancreatitis respectively   IV Labetalol PRN     E coli UTI  Augmentin started 3/29/19. Changed to IV cipro while NPO     Sinus infection  Head CT suggestive of sinus infection. Patient reports ongoing cough, congestion, PND, runny nose SOB for the last month which has improved the last few days.   Patient was started on augmentin for UTI on 3/29/19 which was changed to cipro on admission d/t pancreatitis/NPO. Would consider extending treatment to 10-14 days to cover sinus infection as well.     Acute metabolic encephalopathy - improved  Currently alert and oriented during my evaluation but family reports she is still having episodes of confusion  Possibly resolving UTI? Head CT notes sinusitis but otherwise negative for acute process    Hypoglycemia  -will give dose of D50 this morning  -continue to encourage PO intake and give supplements      DVT Prophylaxis: heparin  Diet: DIET LOW FAT;   Dietary Nutrition Supplements: Standard High Calorie Oral Supplement  Code Status: DNR-CCA    PT/OT Eval Status: ordered    Dispo - continue care, anticipate discharge in 1-2 days    Mary Ferguson MD

## 2019-04-03 NOTE — PROGRESS NOTES
Physical Therapy  Facility/Department: [address] PROGRESSIVE CARE  Daily Treatment Note  NAME: Zaida Serna  : 1930  MRN: [id number]    Date of Service: 4/3/2019    Discharge Recommendations:  Patient would benefit from continued therapy after discharge, 3-5 sessions per week   PT Equipment Recommendations  Equipment Needed: No  Zaida Serna scored a 17/24 on the AM-PAC short mobility form. Current research shows that an AM-PAC score of 17 or less is typically not associated with a discharge to the patient's home setting. Based on the patients AM-PAC score and their current functional mobility deficits, it is recommended that the patient have 3-5 sessions per week of Physical Therapy at d/c to increase the patients independence. Patient Diagnosis(es): The primary encounter diagnosis was Abnormal CXR. Diagnoses of Leukocytosis, unspecified type, Abnormal serum level of lipase, Confusion, Acute maxillary sinusitis, recurrence not specified, and Hypoxia were also pertinent to this visit. has a past medical history of Atrial fibrillation ([address] Carpal tunnel syndrome, bilateral, Cataract, CHF (congestive heart failure) ([address] Hyperlipidemia, Hypertension, and MI (myocardial infarction) ([address] has a past surgical history that includes Total knee arthroplasty (Bilateral); Spine surgery; Carpal tunnel release; eye surgery; Cataract removal; and other surgical history (2017).   Social/Functional History  Lives With: Alone  Type of Home: House  Home Layout: Laundry in basement, Able to Live on Main level with bedroom/bathroom  Home Access: Stairs to enter without rails  Entrance Stairs - Number of Steps: 2 DASHAWN with post to hold onto  Bathroom Shower/Tub: Tub/Shower unit  [employer]: Standard  Bathroom Equipment: Grab bars in shower  Home Equipment: Rolling walker(hurry cane)  ADL Assistance: Independent  Homemaking Assistance: Independent  Ambulation Assistance: Independent(unsure if pt uses AD; she reports she does not)  Transfer Assistance: Independent  Additional Comments: unsure of accuracy from above information as pt having delayed processing and slightly confused (some of info from pt and from previous hospital notes)    Restrictions  Restrictions/Precautions  Restrictions/Precautions: Fall Risk  Position Activity Restriction  Other position/activity restrictions:  IV attached  Subjective   General  Chart Reviewed: Yes  Additional Pertinent Hx: per MD H&P note:  Merline Zhong is a 80 y.o. female with a PMH of CKD, A fib, HTN who presented to ED with complaint of AMS. She is accompanied by her family. They reports she was discharged from nursing home on 3/21/19. She was her normal self until 3/25/19 when she became acutely confused, so they took her to her PCP the next day. UA was collected which was positive for E coli. She was started on augmentin for UTI on 3/29/19. However, patient remained confused despite several days of antibiotic treatment and told family she felt very badly and was afraid she might die. So family called 46 to bring patient to ED for further evaluation. Patient is alert and oriented x 3 at this time, but is still having episodes of confusion per family. Patient also reports ongoing congestion, cough, PND, runny nose, shortness of breath for about a month which has improved the last few days. She reports decreased appetite, fatigue, and nausea for the last couple days. She reports mild lower abdominal/pelvic pain but no epigastric pain. She denies fever, dizziness, chest pain, edema, vomiting, diarrhea, constipation, pain with urination. Response To Previous Treatment: Patient with no complaints from previous session.   Family / Caregiver Present: No  Subjective  Subjective: pt more oriented today; agreeable to therapy; pt reports discomfort in her low back; nurse notified and pillows placed in chair for comfort; pt reported feeling better once in chair Orientation  Orientation  Orientation Level: Oriented to place;Oriented to person(oriented to partial situation)  Cognition   Cognition  Overall Cognitive Status: Exceptions  Arousal/Alertness: Delayed responses to stimuli  Following Commands: Follows one step commands with increased time; Follows one step commands with repetition  Attention Span: Attends with cues to redirect  Memory: Decreased recall of recent events  Safety Judgement: Decreased awareness of need for assistance;Decreased awareness of need for safety  Problem Solving: Assistance required to generate solutions;Assistance required to implement solutions  Insights: Decreased awareness of deficits  Initiation: Requires cues for some  Sequencing: Requires cues for some  Objective   Bed mobility  Supine to Sit: Stand by assistance  Transfers  Sit to Stand: Contact guard assistance  Stand to sit: Contact guard assistance  Ambulation  Ambulation?: Yes  Ambulation 1  Surface: level tile  Device: Rolling Walker  Assistance: Contact guard assistance;Stand by assistance  Quality of Gait: pt continues to have intermittent unsteadiness and becomes SOB with exertion; unable to get pulse ox reading as pt's fingers were cold  Distance: 40'x2 and 10'     Balance  Comments: SBA for sitting balance; CGA/close SBA for standing with walker            Comment: pt used commode; needed assist for wiping and to change adult brief              Assessment   Body structures, Functions, Activity limitations: Decreased functional mobility   Assessment: pt is an 79 yo female who was adm to hosp with acute encephalopathy (MRI negatove for CVA), chronic A-fib, chronic back pain and elevated troponins; pt currently below her baseline of being Ind at home; feel pt will benefit from continued therapy at discharge to address deficits and assist pt in regaining her max potential  Prognosis: Good  Patient Education:  reviewed call light and not getting up without assist  REQUIRES PT FOLLOW UP: Yes  Activity Tolerance  Activity Tolerance: Patient limited by endurance       AM-PAC Score  AM-PAC Inpatient Mobility Raw Score : 17  AM-PAC Inpatient T-Scale Score : 42.13  Mobility Inpatient CMS 0-100% Score: 50.57  Mobility Inpatient CMS G-Code Modifier : CK          Goals  Short term goals  Time Frame for Short term goals: by discharge - all goals ongoing 4-3  Short term goal 1: bed mob MI  Short term goal 2: transfers SBA  Short term goal 3: amb ' with LRAD SBA  Short term goal 4: negotiate stairs when able  Patient Goals   Patient goals : pt wants to be able to get home    Plan    Plan  Times per week: 3-5  Current Treatment Recommendations: Functional Mobility Training  Safety Devices  Type of devices: Call light within reach, Chair alarm in place, Left in chair, Nurse notified     Therapy Time   Individual Concurrent Group Co-treatment   Time In 85 99 60         Time Out 0911         Minutes 52              If patient is discharged prior to the next physical therapy visit;  Please see last written PT note for discharge status.    Elana Rubin, PT, [id number]   ELANA RUBIN, PT

## 2019-04-03 NOTE — PLAN OF CARE
Problem: Falls - Risk of:  Goal: Will remain free from falls  Description  Will remain free from falls  Outcome: Ongoing  Fall risk precautions in place. Bed in lowest position with wheels locked,bed alarm in place and activated, non-skid socks on pt, fall risk ID on pt, call light in reach, pt encouraged to call before getting out of bed and for any other needs or complaints. Problem: Pain:  Goal: Pain level will decrease  Description  Pain level will decrease  Outcome: Ongoing  Pain/discomfort being managed with PRN analgesics per MD orders. Pt able to express presence and absence of pain and rate pain appropriately using numerical scale. Problem: Risk for Impaired Skin Integrity  Goal: Tissue integrity - skin and mucous membranes  Description  Structural intactness and normal physiological function of skin and  mucous membranes. Outcome: Ongoing  Skin assessment completed every shift. Pt assessed for incontinence, appropriate barrier cream applied prn. Pt encouraged to turn/rotate every 2 hours. Assistance provided if pt unable to do so themselves.

## 2019-04-03 NOTE — CARE COORDINATION
Per Clair at [facility], they are willing to accept patient skilled if needed. Sw met with patient and her daughter Fabrizio Goins to discuss plans. Per Fabrizio Goins MD informed them earlier today that she can likely return home. She will be ready in 1-2 days. PT still recommending SNF at this time. Informed them that Sw will have PT/OT re-evaluate patient tomorrow to determine final recommended. They reported that patient lives alone but will have someone who can stay with her during the day. She will not have anyone during the evenings. If SNF is needed d/t safety concerns, they are agreeable to [facility] as originally requested. If able to return home, they are requesting [address]. Choice list provided. They chose [facility]. Referral initiated with Yudi Cruz, [facility]. Will follow up tomorrow to determine discharge disposition(home with [address] vs SNF).      Riana Martinez, [facility]  [phone]  4/3/19 at 2:58 PM

## 2019-04-04 VITALS
TEMPERATURE: 97.4 F | WEIGHT: 117.28 LBS | DIASTOLIC BLOOD PRESSURE: 72 MMHG | HEART RATE: 88 BPM | RESPIRATION RATE: 18 BRPM | OXYGEN SATURATION: 94 % | SYSTOLIC BLOOD PRESSURE: 116 MMHG | BODY MASS INDEX: 23.64 KG/M2 | HEIGHT: 59 IN

## 2019-04-04 PROBLEM — R41.82 ALTERED MENTAL STATUS: Status: RESOLVED | Noted: 2019-03-31 | Resolved: 2019-04-04

## 2019-04-04 LAB
A/G RATIO: 1 (ref 1.1–2.2)
ALBUMIN SERPL-MCNC: 2.5 G/DL (ref 3.4–5)
ALP BLD-CCNC: 71 U/L (ref 40–129)
ALT SERPL-CCNC: 10 U/L (ref 10–40)
ANION GAP SERPL CALCULATED.3IONS-SCNC: 8 MMOL/L (ref 3–16)
AST SERPL-CCNC: 18 U/L (ref 15–37)
BASOPHILS ABSOLUTE: 0 K/UL (ref 0–0.2)
BASOPHILS RELATIVE PERCENT: 0.2 %
BILIRUB SERPL-MCNC: 0.3 MG/DL (ref 0–1)
BUN BLDV-MCNC: 19 MG/DL (ref 7–20)
CALCIUM SERPL-MCNC: 8.4 MG/DL (ref 8.3–10.6)
CHLORIDE BLD-SCNC: 103 MMOL/L (ref 99–110)
CO2: 25 MMOL/L (ref 21–32)
CREAT SERPL-MCNC: 0.8 MG/DL (ref 0.6–1.2)
EOSINOPHILS ABSOLUTE: 0.1 K/UL (ref 0–0.6)
EOSINOPHILS RELATIVE PERCENT: 1 %
GFR AFRICAN AMERICAN: >60
GFR NON-AFRICAN AMERICAN: >60
GLOBULIN: 2.5 G/DL
GLUCOSE BLD-MCNC: 79 MG/DL (ref 70–99)
HCT VFR BLD CALC: 32.6 % (ref 36–48)
HEMOGLOBIN: 10.7 G/DL (ref 12–16)
LIPASE: 44 U/L (ref 13–60)
LYMPHOCYTES ABSOLUTE: 2.5 K/UL (ref 1–5.1)
LYMPHOCYTES RELATIVE PERCENT: 18.6 %
MCH RBC QN AUTO: 31.3 PG (ref 26–34)
MCHC RBC AUTO-ENTMCNC: 32.7 G/DL (ref 31–36)
MCV RBC AUTO: 95.7 FL (ref 80–100)
MONOCYTES ABSOLUTE: 1 K/UL (ref 0–1.3)
MONOCYTES RELATIVE PERCENT: 7.9 %
NEUTROPHILS ABSOLUTE: 9.6 K/UL (ref 1.7–7.7)
NEUTROPHILS RELATIVE PERCENT: 72.3 %
PDW BLD-RTO: 13.7 % (ref 12.4–15.4)
PLATELET # BLD: 168 K/UL (ref 135–450)
PMV BLD AUTO: 9.3 FL (ref 5–10.5)
POTASSIUM REFLEX MAGNESIUM: 3.9 MMOL/L (ref 3.5–5.1)
RBC # BLD: 3.4 M/UL (ref 4–5.2)
SODIUM BLD-SCNC: 136 MMOL/L (ref 136–145)
TOTAL PROTEIN: 5 G/DL (ref 6.4–8.2)
WBC # BLD: 13.3 K/UL (ref 4–11)

## 2019-04-04 PROCEDURE — 2580000003 HC RX 258: Performed by: PHYSICIAN ASSISTANT

## 2019-04-04 PROCEDURE — 85025 COMPLETE CBC W/AUTO DIFF WBC: CPT

## 2019-04-04 PROCEDURE — 97535 SELF CARE MNGMENT TRAINING: CPT

## 2019-04-04 PROCEDURE — 97530 THERAPEUTIC ACTIVITIES: CPT

## 2019-04-04 PROCEDURE — 80053 COMPREHEN METABOLIC PANEL: CPT

## 2019-04-04 PROCEDURE — 2580000003 HC RX 258: Performed by: INTERNAL MEDICINE

## 2019-04-04 PROCEDURE — 6370000000 HC RX 637 (ALT 250 FOR IP): Performed by: INTERNAL MEDICINE

## 2019-04-04 PROCEDURE — 36415 COLL VENOUS BLD VENIPUNCTURE: CPT

## 2019-04-04 PROCEDURE — 6360000002 HC RX W HCPCS: Performed by: PHYSICIAN ASSISTANT

## 2019-04-04 PROCEDURE — C9113 INJ PANTOPRAZOLE SODIUM, VIA: HCPCS | Performed by: PHYSICIAN ASSISTANT

## 2019-04-04 PROCEDURE — 83690 ASSAY OF LIPASE: CPT

## 2019-04-04 RX ORDER — HYDROCODONE BITARTRATE AND ACETAMINOPHEN 7.5; 325 MG/1; MG/1
1 TABLET ORAL EVERY 6 HOURS PRN
Qty: 10 TABLET | Refills: 0 | Status: SHIPPED | OUTPATIENT
Start: 2019-04-04 | End: 2019-04-09

## 2019-04-04 RX ORDER — PANTOPRAZOLE SODIUM 40 MG/1
40 TABLET, DELAYED RELEASE ORAL
Status: DISCONTINUED | OUTPATIENT
Start: 2019-04-05 | End: 2019-04-04 | Stop reason: HOSPADM

## 2019-04-04 RX ADMIN — MORPHINE SULFATE 2 MG: 2 INJECTION, SOLUTION INTRAMUSCULAR; INTRAVENOUS at 11:00

## 2019-04-04 RX ADMIN — ASPIRIN 81 MG 81 MG: 81 TABLET ORAL at 08:14

## 2019-04-04 RX ADMIN — PANTOPRAZOLE SODIUM 40 MG: 40 INJECTION, POWDER, FOR SOLUTION INTRAVENOUS at 06:50

## 2019-04-04 RX ADMIN — MORPHINE SULFATE 2 MG: 2 INJECTION, SOLUTION INTRAMUSCULAR; INTRAVENOUS at 02:29

## 2019-04-04 RX ADMIN — SODIUM CHLORIDE, POTASSIUM CHLORIDE, SODIUM LACTATE AND CALCIUM CHLORIDE: 600; 310; 30; 20 INJECTION, SOLUTION INTRAVENOUS at 02:29

## 2019-04-04 RX ADMIN — LEVETIRACETAM 500 MG: 5 INJECTION INTRAVENOUS at 08:15

## 2019-04-04 RX ADMIN — HEPARIN SODIUM 5000 UNITS: 5000 INJECTION INTRAVENOUS; SUBCUTANEOUS at 06:50

## 2019-04-04 RX ADMIN — CLOPIDOGREL BISULFATE 75 MG: 75 TABLET ORAL at 08:14

## 2019-04-04 RX ADMIN — Medication 10 ML: at 08:15

## 2019-04-04 ASSESSMENT — PAIN SCALES - GENERAL
PAINLEVEL_OUTOF10: 7
PAINLEVEL_OUTOF10: 0
PAINLEVEL_OUTOF10: 0
PAINLEVEL_OUTOF10: 8

## 2019-04-04 ASSESSMENT — PAIN DESCRIPTION - LOCATION
LOCATION: BACK
LOCATION: BACK

## 2019-04-04 ASSESSMENT — PAIN DESCRIPTION - DESCRIPTORS
DESCRIPTORS: ACHING;SHARP
DESCRIPTORS: ACHING

## 2019-04-04 ASSESSMENT — PAIN DESCRIPTION - ONSET: ONSET: ON-GOING

## 2019-04-04 ASSESSMENT — PAIN DESCRIPTION - PAIN TYPE
TYPE: CHRONIC PAIN
TYPE: CHRONIC PAIN

## 2019-04-04 ASSESSMENT — PAIN DESCRIPTION - FREQUENCY
FREQUENCY: INTERMITTENT
FREQUENCY: INTERMITTENT

## 2019-04-04 ASSESSMENT — PAIN DESCRIPTION - PROGRESSION
CLINICAL_PROGRESSION: GRADUALLY WORSENING
CLINICAL_PROGRESSION: GRADUALLY WORSENING

## 2019-04-04 ASSESSMENT — PAIN - FUNCTIONAL ASSESSMENT: PAIN_FUNCTIONAL_ASSESSMENT: PREVENTS OR INTERFERES SOME ACTIVE ACTIVITIES AND ADLS

## 2019-04-04 ASSESSMENT — PAIN DESCRIPTION - ORIENTATION: ORIENTATION: MID

## 2019-04-04 NOTE — PROGRESS NOTES
Occupational Therapy  Facility/Department: [facility] PROGRESSIVE CARE  Daily Treatment Note  NAME: Lenore Crowell  : 1930  MRN: [id number]    Date of Service: 2019    Discharge Recommendations:Marilynn Aponte scored a 16/24 on the AM-PAC ADL Inpatient form. Current research shows that an AM-PAC score of 17 or less is typically not associated with a discharge to the patient's home setting. Based on the patients AM-PAC score and their current ADL deficits, it is recommended that the patient have 3-5 sessions per week of Occupational Therapy at d/c to increase the patients independence. 3-5 sessions per week  OT Equipment Recommendations  Other: TBD     Assessment   Performance deficits / Impairments: Decreased functional mobility ; Decreased strength;Decreased endurance;Decreased ADL status; Decreased balance;Decreased cognition  Assessment: Pt tolerated therapy session fair this date and con't to be functioning below baseline secondary to decreased balance, activity tolerance, and cognition. Pt required up to min A for fxl mobility this date d/t 1 LOB and only able to tolerate brief stance at sink to complete combing hair d/t fatigue. Pt would benefit from con't therapy prior to returning home to assist pt back to baseline and ensure pt safety as she normally lives alone. Con't OT poc. Treatment Diagnosis: impaired func mob, transfers, and ADL status   Prognosis: Fair;Good  Patient Education: importance of OOB activity, safety, walker management, safe transfers. REQUIRES OT FOLLOW UP: Yes  Activity Tolerance  Activity Tolerance: Patient limited by fatigue;Treatment limited secondary to decreased cognition  Safety Devices  Safety Devices in place: Yes  Type of devices: Call light within reach; Chair alarm in place;Gait belt;Left in chair;Nurse notified         Patient Diagnosis(es): The primary encounter diagnosis was Abnormal CXR.  Diagnoses of Leukocytosis, unspecified type, Abnormal serum level of lipase, Confusion, Acute maxillary sinusitis, recurrence not specified, and Hypoxia were also pertinent to this visit. has a past medical history of Atrial fibrillation ([address] Carpal tunnel syndrome, bilateral, Cataract, CHF (congestive heart failure) ([address] Hyperlipidemia, Hypertension, and MI (myocardial infarction) ([address] has a past surgical history that includes Total knee arthroplasty (Bilateral); Spine surgery; Carpal tunnel release; eye surgery; Cataract removal; and other surgical history (07/19/2017). Restrictions  Restrictions/Precautions  Restrictions/Precautions: Fall Risk  Position Activity Restriction  Other position/activity restrictions:  IV attached  Subjective   General  Chart Reviewed: Yes  Patient assessed for rehabilitation services?: Yes  Additional Pertinent Hx: Per Mary Mathews PA-C 3/31/19: Pt is \"80 y.o. female with a PMH of CKD, A fib, HTN who presented to ED with complaint of AMS. She is accompanied by her family. They reports she was discharged from nursing home on 3/21/19. She was her normal self until 3/25/19 when she became acutely confused, so they took her to her PCP the next day. UA was collected which was positive for E coli. She was started on augmentin for UTI on 3/29/19. However, patient remained confused despite several days of antibiotic treatment and told family she felt very badly and was afraid she might die. So family called 46 to bring patient to ED for further evaluation. \"  Family / Caregiver Present: No  Referring Practitioner: Hellen Stafford MD   Diagnosis: Altered mental status   Subjective  Subjective: Patient presents in the bed upon arrival and agreeable to OT txt. Pt lethargic throughout session. Denies pain.        Orientation  Orientation  Overall Orientation Status: Impaired  Orientation Level: Oriented to person;Disoriented to time;Disoriented to situation;Disoriented to place  Objective    ADL  Grooming: Contact guard assistance;Setup(pt combed hair in stance at sink )        Balance  Sitting Balance: Stand by assistance  Standing Balance: Minimal assistance(up to min A for LOB correction; CGA with RW for rest of session )  Standing Balance  Sit to stand: Minimal assistance  Stand to sit: Minimal assistance  Comment: EOB > recliner via RW  Functional Mobility  Functional - Mobility Device: Rolling Walker  Activity: To/from bathroom  Assist Level: Minimal assistance  Functional Mobility Comments: Pt completed fxl mobility from EOB > BR > recliner of 10 ft and then 20 ft with 1 LOB during mobility needing up to min A for correction. Pt stating \"Oh my God, Oh my God\" during most of session however unable to state what was wrong during session when therapist asked multiple times. Bed mobility  Supine to Sit: Stand by assistance  Transfers  Sit to stand: Minimal assistance  Stand to sit: Minimal assistance  Transfer Comments: mod vc's for safe hand placement during transfers. Cognition  Overall Cognitive Status: Exceptions  Arousal/Alertness: Delayed responses to stimuli  Following Commands: Follows one step commands with increased time; Follows one step commands with repetition  Attention Span: Attends with cues to redirect  Memory: Decreased recall of recent events  Safety Judgement: Decreased awareness of need for assistance;Decreased awareness of need for safety  Problem Solving: Assistance required to generate solutions;Assistance required to implement solutions  Insights: Decreased awareness of deficits  Initiation: Requires cues for some  Sequencing: Requires cues for some            Plan   Plan  Times per week: 3-5  Times per day: Daily  Current Treatment Recommendations: Strengthening, Functional Mobility Training, Endurance Training, Balance Training, Safety Education & Training, Equipment Evaluation, Education, & procurement, Patient/Caregiver Education & Training, Self-Care / ADL      AM-PAC Score        AM-PAC Inpatient Daily Activity Raw Score: 16  AM-PAC Inpatient ADL T-Scale Score : 35.96  ADL Inpatient CMS 0-100% Score: 53.32  ADL Inpatient CMS G-Code Modifier : CK    Goals  Short term goals  Time Frame for Short term goals: Status: goals ongoing  Short term goal 1: Pt will complete functional mobility and transfers with SBA  Short term goal 2: Pt will complete bathing with min A  Short term goal 3: Pt will complete dressing with min A  Short term goal 4: Pt will complete toileting with min A  Short term goal 5: Pt will complete grooming with setup   Patient Goals   Patient goals : Pt did not state goal at this time d/t decreased cognition - Pt reports \"I just want to get out of bed, I have been lying here for several days\"       Therapy Time   Individual Concurrent Group Co-treatment   Time In 0730         Time Out 0755         Minutes 25         Timed Code Treatment Minutes: 25 Minutes     If pt is discharged prior to next OT session, this note will serve as the discharge summary.   Priscila Cm OTR/L #[id number]

## 2019-04-04 NOTE — PROGRESS NOTES
Pt worked with PT/OT this morning, able to get up to chair with therapy, this RN ordered breakfast for pt, pt has loss of appetite, encouraged her to eat by ordering eggs, hot tea and toast, will continue to monitor pt

## 2019-04-04 NOTE — DISCHARGE SUMMARY
Iv removed, tele monitor removed, pt dressed and ready for discharge, will go home with son, paperwork reviewed and signed with pt

## 2019-04-04 NOTE — PLAN OF CARE
Fall risk precautions in place. Bed in lowest position with wheels locked,bed alarm in place and activated,non-skid socks on pt, fall risk ID on pt, call light in reach. Patient assessed for fall risk; fall precautions initiated. Patient and family instructed about safety devices. Environment kept free of clutter and adequate lighting provided. Bed locked and in lowest position. Call light within reach. Pain/discomfort being managed with PRN analgesics per MD orders. Pt able to express presence and absence of pain and rate pain appropriately using numerical scale. Patient's ability assessed to perform self care and independent activity encouraged according to that ability. Assisted with ADL's as needed. Risk for skin breakdown assessed. Potential discharge needs assessed. Patient and family included in daily care decisions.

## 2019-04-04 NOTE — DISCHARGE INSTR - COC
Continuity of Care Form    Patient Name: Morenita Arce   :  1930  MRN:  [id number]    Admit date:  3/31/2019  Discharge date:  19    Code Status Order: DNR-CCA   Advance Directives:   [facility] Documentation     Date/Time Healthcare Directive Type of Healthcare Directive Copy in [address] Agent's Name Healthcare Agent's Phone Number    19 0301  Yes, patient has an advance directive for healthcare treatment  Health care treatment directive  No, copy requested from family  Healthcare power of   Daughter  Provided Under contact info.           Admitting Physician:  Diana Titus MD  PCP: Cherylene Bergamo, MD    Discharging Nurse: Fredy Walker Unit/Room#: J2M-9036/9829-46  Discharging Unit Phone Number: [phone]    Emergency Contact:   Extended Emergency Contact Information  Primary Emergency Contact: [facility] [address] [place] [address] Phone: [phone]  Relation: Child  Secondary Emergency Contact: [address] [place] [address] Phone: [phone]  Relation: Child    Past Surgical History:  Past Surgical History:   Procedure Laterality Date    CARPAL TUNNEL RELEASE      CATARACT REMOVAL      EYE SURGERY      OTHER SURGICAL HISTORY  2017    Watchman procedure    SPINE SURGERY      TOTAL KNEE ARTHROPLASTY Bilateral        Immunization History:   Immunization History   Administered Date(s) Administered    Influenza, Quadv, 6 mo and older, IM, PF (Flulaval, Fluarix) 2019    Tdap (Boostrix, Adacel) 2017       Active Problems:  Patient Active Problem List   Diagnosis Code    Primary localized osteoarthrosis, lower leg M17.10    Back pain M54.9    Low back pain M54.5    Facet arthropathy M47.819    DDD (degenerative disc disease), lumbar M51.36    Atrial fibrillation (HCC) I48.91    Shortness of breath R06.02    Hypertension I10    Hyperlipidemia E78.5    Anemia D64.9    Frequent falls R29.6    Permanent atrial fibrillation (HCC) I48.2    Acute encephalopathy G93.40    Confusion R41.0    Moderate malnutrition (HCC) E44.0       Isolation/Infection:   Isolation          No Isolation            Nurse Assessment:  Last Vital Signs: /72   Pulse 88   Temp 97.4 °F (36.3 °C) (Oral)   Resp 18   Ht 4' 11\" (1.499 m)   Wt 117 lb 4.6 oz (53.2 kg)   SpO2 94%   BMI 23.69 kg/m²     Last documented pain score (0-10 scale): Pain Level: 7  Last Weight:   Wt Readings from Last 1 Encounters:   04/04/19 117 lb 4.6 oz (53.2 kg)     Mental Status:  oriented and alert    IV Access:  - None    Nursing Mobility/ADLs:  Walking   Assisted  Transfer  Assisted  Bathing  Assisted  Dressing  Assisted  Toileting  Assisted  Feeding  Independent  Med Admin  Assisted  Med Delivery   whole    Wound Care Documentation and Therapy:        Elimination:  Continence:   · Bowel: Yes  · Bladder: Yes  Urinary Catheter: None   Colostomy/Ileostomy/Ileal Conduit: No       Date of Last BM: 4/3/19    Intake/Output Summary (Last 24 hours) at 4/4/2019 1153  Last data filed at 4/4/2019 1038  Gross per 24 hour   Intake 1984 ml   Output 975 ml   Net 1009 ml     I/O last 3 completed shifts: In: 2104 [P.O.:120; I.V.:1884; IV Piggyback:100]  Out: 675 [Urine:675]    Safety Concerns: At Risk for Falls    Impairments/Disabilities:      Hearing    Nutrition Therapy:  Current Nutrition Therapy:   - Oral Diet:  Low Fat    Routes of Feeding: Oral  Liquids: Thin Liquids  Daily Fluid Restriction: no  Last Modified Barium Swallow with Video (Video Swallowing Test): not done    Treatments at the Time of Hospital Discharge:   Respiratory Treatments: none  Oxygen Therapy:  is not on home oxygen therapy.   Ventilator:    - No ventilator support    Rehab Therapies: NURSE, PT, OT  Weight Bearing Status/Restrictions: No weight bearing restirctions  Other Medical Equipment (for information only, NOT a DME order):  walker  Other Treatments:

## 2019-04-04 NOTE — PROGRESS NOTES
INPATIENT CONSULTATION:    IDENTIFYING DATA/REASON FOR CONSULTATION   PATIENT:  Michaela Uribe  MRN:  [id number]  ADMIT DATE: 3/31/2019  TIME OF EVALUATION: 4/4/2019 9:05 AM  HOSPITAL STAY:   LOS: 4 days   CONSULTING PHYSICIAN: Darling Fernández MD   REASON FOR CONSULTATION: pancreatitis    Subjective:    Patient sitting up in chair and eating breakfast at time of visit. She reports appetite is poor but denies nausea, vomiting or abdominal pain with eating. MEDICATIONS   SCHEDULED:      sodium chloride flush 10 mL 2 times per day   heparin (porcine) 5,000 Units 3 times per day   levetiracetam 500 mg Q12H   pantoprazole 40 mg QAM AC   aspirin 81 mg Daily   clopidogrel 75 mg Daily     FLUIDS/DRIPS:     lactated ringers 100 mL/hr at 04/04/19 0229     PRNs:     sodium chloride flush 10 mL PRN   ondansetron 4 mg Q6H PRN   bisacodyl 10 mg Daily PRN   morphine 2 mg Q3H PRN   acetaminophen 650 mg Q4H PRN   labetalol 10 mg Q6H PRN     ALLERGIES:    Allergies   Allergen Reactions    Elena Advanced Aspirin Ex St [Aspirin] Other (See Comments)     Elena back and body  Off balance and deaf         PHYSICAL EXAM     Vitals:    04/04/19 0200 04/04/19 0322 04/04/19 0400 04/04/19 0600   BP:  114/69     Pulse: 87 100 86 69   Resp:  18     Temp:  97.6 °F (36.4 °C)     TempSrc:  Oral     SpO2:  96%     Weight:  117 lb 4.6 oz (53.2 kg)     Height:           I/O last 3 completed shifts: In: 2104 [P.O.:120; I.V.:1884; IV Piggyback:100]  Out: 675 [Urine:675]    Physical Exam:  Gen: Resting in bed, NAD   HEENT: normocephalic, atraumatic. No scleral icterus.    CV: RRR no MRG   Pul: CTAB   Abd: Good bowel sounds throughout, soft, NT/ND, no masses, no HSM   Ext: No edema   Neuro: No asterixis   Skin: No jaundice, spider angiomas, mathew erythema     LABS AND IMAGING     Recent Results (from the past 24 hour(s))   Lipase    Collection Time: 04/04/19  6:13 AM   Result Value Ref Range    Lipase 44.0 13.0 - 60.0 U/L   Comprehensive Metabolic Panel w/ Reflex to MG    Collection Time: 04/04/19  6:13 AM   Result Value Ref Range    Sodium 136 136 - 145 mmol/L    Potassium reflex Magnesium 3.9 3.5 - 5.1 mmol/L    Chloride 103 99 - 110 mmol/L    CO2 25 21 - 32 mmol/L    Anion Gap 8 3 - 16    Glucose 79 70 - 99 mg/dL    BUN 19 7 - 20 mg/dL    CREATININE 0.8 0.6 - 1.2 mg/dL    GFR Non-African American >60 >60    GFR African American >60 >60    Calcium 8.4 8.3 - 10.6 mg/dL    Total Protein 5.0 (L) 6.4 - 8.2 g/dL    Alb 2.5 (L) 3.4 - 5.0 g/dL    Albumin/Globulin Ratio 1.0 (L) 1.1 - 2.2    Total Bilirubin 0.3 0.0 - 1.0 mg/dL    Alkaline Phosphatase 71 40 - 129 U/L    ALT 10 10 - 40 U/L    AST 18 15 - 37 U/L    Globulin 2.5 g/dL   CBC Auto Differential    Collection Time: 04/04/19  6:13 AM   Result Value Ref Range    WBC 13.3 (H) 4.0 - 11.0 K/uL    RBC 3.40 (L) 4.00 - 5.20 M/uL    Hemoglobin 10.7 (L) 12.0 - 16.0 g/dL    Hematocrit 32.6 (L) 36.0 - 48.0 %    MCV 95.7 80.0 - 100.0 fL    MCH 31.3 26.0 - 34.0 pg    MCHC 32.7 31.0 - 36.0 g/dL    RDW 13.7 12.4 - 15.4 %    Platelets 380 085 - 199 K/uL    MPV 9.3 5.0 - 10.5 fL    Neutrophils % 72.3 %    Lymphocytes % 18.6 %    Monocytes % 7.9 %    Eosinophils % 1.0 %    Basophils % 0.2 %    Neutrophils # 9.6 (H) 1.7 - 7.7 K/uL    Lymphocytes # 2.5 1.0 - 5.1 K/uL    Monocytes # 1.0 0.0 - 1.3 K/uL    Eosinophils # 0.1 0.0 - 0.6 K/uL    Basophils # 0.0 0.0 - 0.2 K/uL     Other Labs    Imaging  US GALLBLADDER RUQ   Final Result   No cholelithiasis or ancillary evidence of acute cholecystitis. CT CHEST ABDOMEN PELVIS WO CONTRAST   Final Result   1. Findings suggesting mild acute pancreatitis at pancreatic head. Correlate   with serum amylase/lipase levels. 2. No CT chest finding to account for radiographic finding with exception of   a focal area of scarring in the lingula left upper lobe which may calcific or   part of the finding. No acute pulmonary disease evident. 3. Cardiomegaly.    4. Calcific atherosclerosis aorta and its branches. CT Head WO Contrast   Final Result   No acute intracranial abnormality. Interval development of findings typical of acute sinusitis bilateral   maxillary sinuses, bilateral ethmoid air cells and left sphenoid sinus. Senescent cerebral changes. XR CHEST PORTABLE   Final Result   1. Indeterminate 2 cm rounded opacity central to medial lower left chest   concerning for new mass. 2. Calcific atherosclerosis aorta. 3. Cardiomegaly. RECOMMENDATION:   IV contrast-enhanced CT chest             Endoscopy      ASSESSMENT AND RECOMMENDATIONS   Christine Lai is a 80 y.o. female with PMH Afib, s/p Watchman, CHF, HTN, HLD,  who presented on 3/31/2019 with AMS. We have been consulted regarding pancreatitis. CT chest/abd/pel showed mid acute pancreatitis at the pancreatic head. Lipase of 496. Additional blood work shows leukocytosis and SHANTEL        1. Acute uncomplicated pancreatitis:  Clinically improved. Tolerating low fat diet without pain/n/v.  Etiology unknown. US negative for gallstones. Normal TG/Ca.  Non-smoker, no Etoh.  No overt offending meds noted. First episode.  CT without contrast- No overt masses noted.    2. SHANTEL: improving  3. Leukocytosis: Stable   4. UTI:  On cipro      RECOMMENDATIONS:    Continue low fat diet,  Add ensure with meals  Will have pt follow up with Dr. Rai Birmingham in 1 month to discuss if further work up/imaging warranted. Edith Abraham from GI standpoint for discharge    If you have any questions or need any further information, please feel free to contact us [phone]. Thank you for allowing us to participate in the care of Christine Lai. Hattie MCMAHON    Attending physician addendum:      I have personally seen and examined the patient, reviewed the patient's medical record and pertinent labs and clinical imaging. I have personally staffed the case with LISANDRO Mera.   I agree with her consultation note, exam findings, assessment and plans  as written above. I have made appropriate modifications and edited her assessment and plan where needed to reflect my impression and plans for this patient. Patient with marginal po intake. Denies vomiting or abdominal pain  VSS. Exam: Abd- soft, NT, ND  CV- RRR    Labs reviewed. Lipase normal.  LFT normal.    Hgb 10.7. WBC down to 13 (was 18)    1) Acute uncomplicated pancreatitis-  improved clinically.  Lipase normal.  Pain improved.  New onset at age 80.  CT without contrast showed no complications.  No overt masses noted.  US negative for gallstones. Normal TG/Ca.  Non-smoker, no Etoh.  She does have polypharmacy with recent abx for UTI.  No overt offending meds noted  2) PATRICIA- resolved  3) Leukocytosis NOS-  No fever noted.  May be reactive from acute pancreatitis. On Cipro but will D/C  4) Afib- s/p Watchman's-  On ASA and Plavix        Plan:  Continue a low fat diet  Continue supportive care for now- Will decrease IV fluids to 75ml/hr  Patient can follow up in my office in next 3-4 weeks to discuss whether we will pursue further surveillance of her pancreatitis. Would need to consider either an MRI vs Pancreatic protocol CT (can do CT if PATRICIA remains improved)   Likely will defer EUS in light of her advanced age and sedation risks  Likely can d/c back to NH when hospitalist sees fit. Will sign off. Call with ? .        Thank you for allowing me to participate in this patient's care. If there are any questions or concerns regarding this patient, or the plan we have set in place, please feel free to contact me at [phone].      Tierney Ca, DO

## 2019-04-04 NOTE — DISCHARGE SUMMARY
Hospital Medicine Discharge Summary    Patient ID: Erasto Hobson      Patient's PCP: Randy Grover MD    Admit Date: 3/31/2019     Discharge Date:   4/4/2019    Admitting Physician: Valeria Mcallister MD     Discharge Physician: Melba Marquez MD     Discharge Diagnoses: Active Hospital Problems    Diagnosis Date Noted    Moderate malnutrition ([facility] [address] [E44.0] 04/01/2019       The patient was seen and examined on day of discharge and this discharge summary is in conjunction with any daily progress note from day of discharge. Hospital Course: Erasto Hobson is a 80 y.o. female with a PMH of CKD, A fib, HTN who presented to ED with complaint of AMS. She is accompanied by her family. They reports she was discharged from nursing home on 3/21/19. She was her normal self until 3/25/19 when she became acutely confused, so they took her to her PCP the next day. UA was collected which was positive for E coli. She was started on augmentin for UTI on 3/29/19. However, patient remained confused despite several days of antibiotic treatment and told family she felt very badly and was afraid she might die. So family called 911 to bring patient to ED for further evaluation.      Patient is alert and oriented x 3 at this time, but is still having episodes of confusion per family. Patient also reports ongoing congestion, cough, PND, runny nose, shortness of breath for about a month which has improved the last few days. She reports decreased appetite, fatigue, and nausea for the last couple days. She reports mild lower abdominal/pelvic pain but no epigastric pain. She denies fever, dizziness, chest pain, edema, vomiting, diarrhea, constipation, pain with urination. Pancreatitis, acute   Etiology unclear. No epigastric pain but elevated lipase with mild pancreatitis on CT abdomen. ? Medication-induced. LFTs and Bili levels are normal, which would argue against Choledocholithiasis as a cause.    TGs WNL  Hold Temp 97.4 °F (36.3 °C) (Oral)   Resp 18   Ht 4' 11\" (1.499 m)   Wt 117 lb 4.6 oz (53.2 kg)   SpO2 94%   BMI 23.69 kg/m²       General appearance:  No apparent distress, appears stated age and cooperative. HEENT:  Normal cephalic, atraumatic without obvious deformity. Pupils equal, round, and reactive to light. Extra ocular muscles intact. Conjunctivae/corneas clear. Neck: Supple, with full range of motion. No jugular venous distention. Trachea midline. Respiratory:  Normal respiratory effort. Clear to auscultation, bilaterally without Rales/Wheezes/Rhonchi. Cardiovascular:  Regular rate and rhythm with normal S1/S2 without murmurs, rubs or gallops. Abdomen: Soft, non-tender, non-distended with normal bowel sounds. Musculoskeletal:  No clubbing, cyanosis or edema bilaterally. Full range of motion without deformity. Skin: Skin color, texture, turgor normal.  No rashes or lesions. Neurologic:  Neurovascularly intact without any focal sensory/motor deficits. Cranial nerves: II-XII intact, grossly non-focal.  Psychiatric:  Alert and oriented, thought content appropriate, normal insight  Capillary Refill: Brisk,< 3 seconds   Peripheral Pulses: +2 palpable, equal bilaterally       Labs: For convenience and continuity at follow-up the following most recent labs are provided:      CBC:    Lab Results   Component Value Date    WBC 13.3 04/04/2019    HGB 10.7 04/04/2019    HCT 32.6 04/04/2019     04/04/2019       Renal:    Lab Results   Component Value Date     04/04/2019    K 3.9 04/04/2019     04/04/2019    CO2 25 04/04/2019    BUN 19 04/04/2019    CREATININE 0.8 04/04/2019    CALCIUM 8.4 04/04/2019    PHOS 3.8 02/05/2014         Significant Diagnostic Studies    Radiology:   US GALLBLADDER RUQ   Final Result   No cholelithiasis or ancillary evidence of acute cholecystitis. CT CHEST ABDOMEN PELVIS WO CONTRAST   Final Result   1.  Findings suggesting mild acute pancreatitis at pancreatic head. Correlate   with serum amylase/lipase levels. 2. No CT chest finding to account for radiographic finding with exception of   a focal area of scarring in the lingula left upper lobe which may calcific or   part of the finding. No acute pulmonary disease evident. 3. Cardiomegaly. 4. Calcific atherosclerosis aorta and its branches. CT Head WO Contrast   Final Result   No acute intracranial abnormality. Interval development of findings typical of acute sinusitis bilateral   maxillary sinuses, bilateral ethmoid air cells and left sphenoid sinus. Senescent cerebral changes. XR CHEST PORTABLE   Final Result   1. Indeterminate 2 cm rounded opacity central to medial lower left chest   concerning for new mass. 2. Calcific atherosclerosis aorta. 3. Cardiomegaly. RECOMMENDATION:   IV contrast-enhanced CT chest                Consults:     IP CONSULT TO GI    Disposition:  Home with home health    Condition at Discharge: Stable    Discharge Instructions/Follow-up:  PCP in 1 week    Code Status:  DNR-CCA     Activity: activity as tolerated    Diet: low fat, low cholesterol diet      Discharge Medications:     Current Discharge Medication List           Details   HYDROcodone-acetaminophen (NORCO) 7.5-325 MG per tablet Take 1 tablet by mouth every 6 hours as needed for Pain for up to 5 days.   Qty: 10 tablet, Refills: 0    Comments: Reduce doses taken as pain becomes manageable  Associated Diagnoses: Back pain, unspecified back location, unspecified back pain laterality, unspecified chronicity              Details   aspirin 81 MG chewable tablet Take 1 tablet by mouth daily  Qty: 30 tablet, Refills: 3      venlafaxine (EFFEXOR XR) 75 MG extended release capsule Take 75 mg by mouth daily      traZODone (DESYREL) 50 MG tablet Take 1 tablet by mouth nightly  Qty: 30 tablet, Refills: 0      calcium carbonate 600 MG TABS tablet Take 1 tablet by mouth daily      CINNAMON PO Take

## 2019-04-04 NOTE — CARE COORDINATION
Per OT, patient is still needing SNF level of care as she lives alone. Sw spoke to patient's daughter Avtar Lopez over the phone and patient in her room. They have decided for patient to return home. Patient is declining SNF placement at this time. Avtar Lopez reported that she feels that patient is at her baseline and is safe to return home. Patient's son lives next door and will be there during the day time. They feel that she is fine to be alone other parts of the day. They would like her to return home with [facility]. Referral had been initiated with [facility] previously. Dominique informed Luisa Bledsoe. Dominique notified MD, HC order needed and TIA needs to be changed.      Cassia Arredondo, [address] [facility]  [phone]  4/4/19 at 2:44 PM

## 2019-04-06 LAB
BLOOD CULTURE, ROUTINE: NORMAL
CULTURE, BLOOD 2: NORMAL

## 2019-04-23 DIAGNOSIS — I48.19 PERSISTENT ATRIAL FIBRILLATION (HCC): ICD-10-CM

## 2019-04-23 RX ORDER — CLOPIDOGREL BISULFATE 75 MG/1
TABLET ORAL
Qty: 90 TABLET | Refills: 2 | Status: SHIPPED | OUTPATIENT
Start: 2019-04-23

## 2019-05-16 ENCOUNTER — APPOINTMENT (OUTPATIENT)
Dept: GENERAL RADIOLOGY | Age: 84
DRG: 872 | End: 2019-05-16
Payer: MEDICARE

## 2019-05-16 ENCOUNTER — APPOINTMENT (OUTPATIENT)
Dept: CT IMAGING | Age: 84
DRG: 872 | End: 2019-05-16
Payer: MEDICARE

## 2019-05-16 ENCOUNTER — HOSPITAL ENCOUNTER (INPATIENT)
Age: 84
LOS: 11 days | Discharge: HOSPICE | DRG: 872 | End: 2019-05-27
Attending: EMERGENCY MEDICINE | Admitting: INTERNAL MEDICINE
Payer: MEDICARE

## 2019-05-16 DIAGNOSIS — N17.9 ACUTE KIDNEY INJURY (HCC): ICD-10-CM

## 2019-05-16 DIAGNOSIS — E87.5 HYPERKALEMIA: ICD-10-CM

## 2019-05-16 DIAGNOSIS — K51.00 PANCOLITIS (HCC): Primary | ICD-10-CM

## 2019-05-16 DIAGNOSIS — E86.0 DEHYDRATION: ICD-10-CM

## 2019-05-16 DIAGNOSIS — A04.72 C. DIFFICILE COLITIS: ICD-10-CM

## 2019-05-16 LAB
A/G RATIO: 0.8 (ref 1.1–2.2)
ALBUMIN SERPL-MCNC: 2.9 G/DL (ref 3.4–5)
ALP BLD-CCNC: 106 U/L (ref 40–129)
ALT SERPL-CCNC: 13 U/L (ref 10–40)
ANION GAP SERPL CALCULATED.3IONS-SCNC: 17 MMOL/L (ref 3–16)
AST SERPL-CCNC: 19 U/L (ref 15–37)
BANDED NEUTROPHILS RELATIVE PERCENT: 6 % (ref 0–7)
BASOPHILS ABSOLUTE: 0 K/UL (ref 0–0.2)
BASOPHILS RELATIVE PERCENT: 0 %
BILIRUB SERPL-MCNC: 0.3 MG/DL (ref 0–1)
BILIRUBIN URINE: ABNORMAL
BLOOD, URINE: NEGATIVE
BUN BLDV-MCNC: 64 MG/DL (ref 7–20)
CALCIUM SERPL-MCNC: 9.3 MG/DL (ref 8.3–10.6)
CASTS: ABNORMAL /LPF
CHLORIDE BLD-SCNC: 96 MMOL/L (ref 99–110)
CLARITY: ABNORMAL
CO2: 23 MMOL/L (ref 21–32)
COLOR: ABNORMAL
COMMENT UA: ABNORMAL
CREAT SERPL-MCNC: 2.5 MG/DL (ref 0.6–1.2)
CREATININE URINE: 135.1 MG/DL (ref 28–259)
EOSINOPHILS ABSOLUTE: 0 K/UL (ref 0–0.6)
EOSINOPHILS RELATIVE PERCENT: 0 %
EPITHELIAL CELLS, UA: 2 /HPF (ref 0–5)
GFR AFRICAN AMERICAN: 22
GFR NON-AFRICAN AMERICAN: 18
GLOBULIN: 3.5 G/DL
GLUCOSE BLD-MCNC: 142 MG/DL (ref 70–99)
GLUCOSE URINE: NEGATIVE MG/DL
HCT VFR BLD CALC: 40.1 % (ref 36–48)
HEMOGLOBIN: 13.1 G/DL (ref 12–16)
KETONES, URINE: NEGATIVE MG/DL
LACTIC ACID, SEPSIS: 1.7 MMOL/L (ref 0.4–1.9)
LACTIC ACID, SEPSIS: 2.4 MMOL/L (ref 0.4–1.9)
LEUKOCYTE ESTERASE, URINE: ABNORMAL
LYMPHOCYTES ABSOLUTE: 0.7 K/UL (ref 1–5.1)
LYMPHOCYTES RELATIVE PERCENT: 2 %
MCH RBC QN AUTO: 31.4 PG (ref 26–34)
MCHC RBC AUTO-ENTMCNC: 32.6 G/DL (ref 31–36)
MCV RBC AUTO: 96 FL (ref 80–100)
MICROSCOPIC EXAMINATION: YES
MONOCYTES ABSOLUTE: 1.8 K/UL (ref 0–1.3)
MONOCYTES RELATIVE PERCENT: 5 %
MYELOCYTE PERCENT: 3 %
NEUTROPHILS ABSOLUTE: 32.8 K/UL (ref 1.7–7.7)
NEUTROPHILS RELATIVE PERCENT: 84 %
NITRITE, URINE: NEGATIVE
PDW BLD-RTO: 13.9 % (ref 12.4–15.4)
PH UA: 5 (ref 5–8)
PLATELET # BLD: 258 K/UL (ref 135–450)
PMV BLD AUTO: 9 FL (ref 5–10.5)
POTASSIUM REFLEX MAGNESIUM: 5.4 MMOL/L (ref 3.5–5.1)
PRO-BNP: 6433 PG/ML (ref 0–449)
PROTEIN UA: 30 MG/DL
RBC # BLD: 4.17 M/UL (ref 4–5.2)
RBC # BLD: NORMAL 10*6/UL
RBC UA: 2 /HPF (ref 0–4)
SODIUM BLD-SCNC: 136 MMOL/L (ref 136–145)
SODIUM URINE: <20 MMOL/L
SPECIFIC GRAVITY UA: 1.02 (ref 1–1.03)
TOTAL PROTEIN: 6.4 G/DL (ref 6.4–8.2)
TROPONIN: <0.01 NG/ML
URINE REFLEX TO CULTURE: YES
URINE TYPE: ABNORMAL
UROBILINOGEN, URINE: 1 E.U./DL
WBC # BLD: 35.3 K/UL (ref 4–11)
WBC UA: 3 /HPF (ref 0–5)

## 2019-05-16 PROCEDURE — 84484 ASSAY OF TROPONIN QUANT: CPT

## 2019-05-16 PROCEDURE — 87040 BLOOD CULTURE FOR BACTERIA: CPT

## 2019-05-16 PROCEDURE — 99285 EMERGENCY DEPT VISIT HI MDM: CPT

## 2019-05-16 PROCEDURE — 6370000000 HC RX 637 (ALT 250 FOR IP): Performed by: INTERNAL MEDICINE

## 2019-05-16 PROCEDURE — 82570 ASSAY OF URINE CREATININE: CPT

## 2019-05-16 PROCEDURE — 96365 THER/PROPH/DIAG IV INF INIT: CPT

## 2019-05-16 PROCEDURE — 83605 ASSAY OF LACTIC ACID: CPT

## 2019-05-16 PROCEDURE — 6370000000 HC RX 637 (ALT 250 FOR IP)

## 2019-05-16 PROCEDURE — 93005 ELECTROCARDIOGRAM TRACING: CPT | Performed by: PHYSICIAN ASSISTANT

## 2019-05-16 PROCEDURE — 87449 NOS EACH ORGANISM AG IA: CPT

## 2019-05-16 PROCEDURE — 84300 ASSAY OF URINE SODIUM: CPT

## 2019-05-16 PROCEDURE — 2580000003 HC RX 258: Performed by: INTERNAL MEDICINE

## 2019-05-16 PROCEDURE — 87324 CLOSTRIDIUM AG IA: CPT

## 2019-05-16 PROCEDURE — 71045 X-RAY EXAM CHEST 1 VIEW: CPT

## 2019-05-16 PROCEDURE — 2580000003 HC RX 258: Performed by: PHYSICIAN ASSISTANT

## 2019-05-16 PROCEDURE — 6360000002 HC RX W HCPCS: Performed by: INTERNAL MEDICINE

## 2019-05-16 PROCEDURE — 74176 CT ABD & PELVIS W/O CONTRAST: CPT

## 2019-05-16 PROCEDURE — 80053 COMPREHEN METABOLIC PANEL: CPT

## 2019-05-16 PROCEDURE — 93010 ELECTROCARDIOGRAM REPORT: CPT | Performed by: INTERNAL MEDICINE

## 2019-05-16 PROCEDURE — 87086 URINE CULTURE/COLONY COUNT: CPT

## 2019-05-16 PROCEDURE — 83880 ASSAY OF NATRIURETIC PEPTIDE: CPT

## 2019-05-16 PROCEDURE — 2500000003 HC RX 250 WO HCPCS: Performed by: EMERGENCY MEDICINE

## 2019-05-16 PROCEDURE — 1200000000 HC SEMI PRIVATE

## 2019-05-16 PROCEDURE — 6360000002 HC RX W HCPCS: Performed by: EMERGENCY MEDICINE

## 2019-05-16 PROCEDURE — 85025 COMPLETE CBC W/AUTO DIFF WBC: CPT

## 2019-05-16 PROCEDURE — 36415 COLL VENOUS BLD VENIPUNCTURE: CPT

## 2019-05-16 PROCEDURE — 81001 URINALYSIS AUTO W/SCOPE: CPT

## 2019-05-16 PROCEDURE — 96367 TX/PROPH/DG ADDL SEQ IV INF: CPT

## 2019-05-16 PROCEDURE — 94760 N-INVAS EAR/PLS OXIMETRY 1: CPT

## 2019-05-16 RX ORDER — TIZANIDINE 4 MG/1
2 TABLET ORAL EVERY 8 HOURS PRN
Status: DISCONTINUED | OUTPATIENT
Start: 2019-05-16 | End: 2019-05-27 | Stop reason: HOSPADM

## 2019-05-16 RX ORDER — SODIUM CHLORIDE 0.9 % (FLUSH) 0.9 %
10 SYRINGE (ML) INJECTION PRN
Status: DISCONTINUED | OUTPATIENT
Start: 2019-05-16 | End: 2019-05-27 | Stop reason: HOSPADM

## 2019-05-16 RX ORDER — POLYETHYLENE GLYCOL 3350 17 G/17G
17 POWDER, FOR SOLUTION ORAL DAILY PRN
COMMUNITY

## 2019-05-16 RX ORDER — CIPROFLOXACIN 2 MG/ML
400 INJECTION, SOLUTION INTRAVENOUS ONCE
Status: COMPLETED | OUTPATIENT
Start: 2019-05-16 | End: 2019-05-16

## 2019-05-16 RX ORDER — ONDANSETRON 2 MG/ML
4 INJECTION INTRAMUSCULAR; INTRAVENOUS EVERY 6 HOURS PRN
Status: DISCONTINUED | OUTPATIENT
Start: 2019-05-16 | End: 2019-05-27 | Stop reason: HOSPADM

## 2019-05-16 RX ORDER — VENLAFAXINE HYDROCHLORIDE 75 MG/1
75 CAPSULE, EXTENDED RELEASE ORAL NIGHTLY
Status: DISCONTINUED | OUTPATIENT
Start: 2019-05-16 | End: 2019-05-27 | Stop reason: HOSPADM

## 2019-05-16 RX ORDER — TRAZODONE HYDROCHLORIDE 50 MG/1
50 TABLET ORAL NIGHTLY
Status: DISCONTINUED | OUTPATIENT
Start: 2019-05-16 | End: 2019-05-27 | Stop reason: HOSPADM

## 2019-05-16 RX ORDER — SODIUM CHLORIDE 0.9 % (FLUSH) 0.9 %
10 SYRINGE (ML) INJECTION EVERY 12 HOURS SCHEDULED
Status: DISCONTINUED | OUTPATIENT
Start: 2019-05-16 | End: 2019-05-27 | Stop reason: HOSPADM

## 2019-05-16 RX ORDER — 0.9 % SODIUM CHLORIDE 0.9 %
1000 INTRAVENOUS SOLUTION INTRAVENOUS ONCE
Status: COMPLETED | OUTPATIENT
Start: 2019-05-16 | End: 2019-05-16

## 2019-05-16 RX ORDER — HEPARIN SODIUM 5000 [USP'U]/ML
5000 INJECTION, SOLUTION INTRAVENOUS; SUBCUTANEOUS EVERY 8 HOURS SCHEDULED
Status: DISCONTINUED | OUTPATIENT
Start: 2019-05-16 | End: 2019-05-27 | Stop reason: HOSPADM

## 2019-05-16 RX ORDER — SODIUM CHLORIDE 9 MG/ML
INJECTION, SOLUTION INTRAVENOUS CONTINUOUS
Status: DISCONTINUED | OUTPATIENT
Start: 2019-05-16 | End: 2019-05-18

## 2019-05-16 RX ORDER — VANCOMYCIN HYDROCHLORIDE 250 MG/1
250 CAPSULE ORAL 4 TIMES DAILY
Status: DISCONTINUED | OUTPATIENT
Start: 2019-05-16 | End: 2019-05-27 | Stop reason: HOSPADM

## 2019-05-16 RX ORDER — VANCOMYCIN HYDROCHLORIDE 125 MG/1
125 CAPSULE ORAL ONCE
Status: DISCONTINUED | OUTPATIENT
Start: 2019-05-16 | End: 2019-05-18

## 2019-05-16 RX ORDER — SOTALOL HYDROCHLORIDE 80 MG/1
40 TABLET ORAL DAILY
Status: DISCONTINUED | OUTPATIENT
Start: 2019-05-17 | End: 2019-05-27 | Stop reason: HOSPADM

## 2019-05-16 RX ADMIN — SODIUM CHLORIDE 1000 ML: 9 INJECTION, SOLUTION INTRAVENOUS at 16:12

## 2019-05-16 RX ADMIN — VENLAFAXINE HYDROCHLORIDE 75 MG: 75 CAPSULE, EXTENDED RELEASE ORAL at 23:10

## 2019-05-16 RX ADMIN — VANCOMYCIN HYDROCHLORIDE 250 MG: 250 CAPSULE ORAL at 23:01

## 2019-05-16 RX ADMIN — SODIUM CHLORIDE: 9 INJECTION, SOLUTION INTRAVENOUS at 23:09

## 2019-05-16 RX ADMIN — HEPARIN SODIUM 5000 UNITS: 5000 INJECTION INTRAVENOUS; SUBCUTANEOUS at 23:01

## 2019-05-16 RX ADMIN — CIPROFLOXACIN 400 MG: 2 INJECTION, SOLUTION INTRAVENOUS at 16:18

## 2019-05-16 RX ADMIN — TRAZODONE HYDROCHLORIDE 50 MG: 50 TABLET ORAL at 23:01

## 2019-05-16 RX ADMIN — Medication 10 ML: at 23:10

## 2019-05-16 RX ADMIN — METRONIDAZOLE 500 MG: 500 INJECTION, SOLUTION INTRAVENOUS at 17:11

## 2019-05-16 RX ADMIN — SODIUM CHLORIDE 1000 ML: 9 INJECTION, SOLUTION INTRAVENOUS at 14:32

## 2019-05-16 ASSESSMENT — PAIN DESCRIPTION - PROGRESSION
CLINICAL_PROGRESSION: GRADUALLY WORSENING

## 2019-05-16 NOTE — ED PROVIDER NOTES
Attending Supervising Physicians Attestation Statement  I was present with the Mid Level Provider during the history and exam. I discussed the findings and plans with the Mid Level Provider and agree as documented in his note     Concern for C Diff colitis    PO vanc and flagyl    Total Critical Care time was 38 minutes, excluding separately reportable procedures. There was a high probability of clinically significant/life threatening deterioration in the patient's condition which required my urgent intervention.       Impression  Sepsis  C Diff colitis    Vitals:    05/16/19 1516 05/16/19 1531 05/16/19 1601 05/16/19 1618   BP: (!) 125/43 119/68 130/66    Pulse: 95 94 93 89   Resp: 20 22 18 18   Temp:       TempSrc:       SpO2: 92% 92%     Weight:       Height:           Electronically signed by Nai Michelle MD on 5/16/19 at 5:34 PM            Nai Michelle MD  05/16/19 1758

## 2019-05-16 NOTE — ED NOTES
Son and daughter at bedside. States patient lives alone but has had diarrhea past 4 days and getting weaker, not eating. Similar symptoms in the past and patient had a UTI. Informed urinalysis was ordered but not collected. Updated on plan to collect.      Flora Granger RN  05/16/19 9336

## 2019-05-16 NOTE — H&P
Hospital Medicine History & Physical      PCP: Sundar Garcia MD    Date of Admission: 5/16/2019    Date of Service: Pt seen/examined on 5/16/19  and Admitted to Inpatient with expected LOS greater than two midnights due to medical therapy. Chief Complaint:  Fatigue diarrhea    History Of Present Illness:    80 y.o. female who presented to [facility] [place] with diarrhea. Pt states this started the other day. Recent abx  Mid epigastric pain. Non bloody  No fever some chills. Recent treatment for UTIs w abx    Past Medical History:          Diagnosis Date    Atrial fibrillation ([facility] [address]     Carpal tunnel syndrome, bilateral     Cataract     CHF (congestive heart failure) (HCC)     Hyperlipidemia     Hypertension     MI (myocardial infarction) ([facility] [address]        Past Surgical History:          Procedure Laterality Date    CARPAL TUNNEL RELEASE      CATARACT REMOVAL      EYE SURGERY      OTHER SURGICAL HISTORY  07/19/2017    Watchman procedure    SPINE SURGERY      TOTAL KNEE ARTHROPLASTY Bilateral        Medications Prior to Admission:      Prior to Admission medications    Medication Sig Start Date End Date Taking? Authorizing Provider   clopidogrel (PLAVIX) 75 MG tablet TAKE 1 TABLET BY MOUTH EVERY DAY 4/23/19  Yes Annemarie Soares MD   aspirin 81 MG chewable tablet Take 1 tablet by mouth daily 3/4/19  Yes Tangela Robbins MD   venlafaxine (EFFEXOR XR) 75 MG extended release capsule Take 75 mg by mouth nightly    Yes Historical Provider, MD   traZODone (DESYREL) 50 MG tablet Take 1 tablet by mouth nightly 11/2/17  Yes Annemarie Soares MD   calcium carbonate 600 MG TABS tablet Take 1 tablet by mouth every evening    Yes Historical Provider, MD   pravastatin (PRAVACHOL) 40 MG tablet Take 40 mg by mouth every evening. Yes Viji Ryan MD   omeprazole (PRILOSEC) 40 MG capsule Take 40 mg by mouth daily as needed (acid reflux) Take 40 mg by mouth daily.      Yes Sundar Garcia MD   lisinopril sensory/motor deficits. Cranial nerves: II-XII intact, grossly non-focal.  Psychiatric:  Alert and oriented, thought content appropriate, normal insight  Capillary Refill: Brisk,< 3 seconds   Peripheral Pulses: +2 palpable, equal bilaterally       Labs:     Recent Labs     05/16/19  1436   WBC 35.3*   HGB 13.1   HCT 40.1        Recent Labs     05/16/19  1436      K 5.4*   CL 96*   CO2 23   BUN 64*   CREATININE 2.5*   CALCIUM 9.3     Recent Labs     05/16/19  1436   AST 19   ALT 13   BILITOT 0.3   ALKPHOS 106     No results for input(s): INR in the last 72 hours. Recent Labs     05/16/19  1436   TROPONINI <0.01       Urinalysis:      Lab Results   Component Value Date    NITRU Negative 05/16/2019    WBCUA 3 05/16/2019    BACTERIA RARE 02/28/2019    RBCUA 2 05/16/2019    BLOODU Negative 05/16/2019    SPECGRAV 1.023 05/16/2019    GLUCOSEU Negative 05/16/2019       Radiology:     Reviewed by me  CT ABDOMEN PELVIS WO CONTRAST   Final Result   Pancolitis, infectious or inflammatory. RECOMMENDATIONS:   1         XR CHEST PORTABLE   Final Result   No acute findings             ASSESSMENT:    Colitis-prob c diff  afib  HTN  ARF    PLAN:    Vancomycin po  Flagyl iv  Stool studies  ivf hydration ns at 125 ml/hr  Urine na and urine cr ordered  Hold ace. Avoid nephrotoxins      DVT Prophylaxis: hep sq  Diet: No diet orders on file  Code Status: Prior        Dispo - 3-4 d       Erika Yeh MD    Thank you Wil Hazel MD for the opportunity to be involved in this patient's care. If you have any questions or concerns please feel free to contact me at [phone].

## 2019-05-16 NOTE — ED NOTES
UPdated patiient and family on plan for Lactic acid and blood culture order. Still need urinalysis.      Mehreen Guerin RN  05/16/19 9925

## 2019-05-16 NOTE — ED PROVIDER NOTES
[address]       Pt Name: Merline Zhong  MRN: [id number]  [place] 5/6/1930  Date of evaluation: 5/16/2019  Provider: Blas Souza, [facility]       Chief Complaint   Patient presents with    Fatigue     x 3 days    Diarrhea         HISTORY OF PRESENT ILLNESS  (Location/Symptom, Timing/Onset, Context/Setting, Quality, Duration, Modifying Factors, Severity.)   Merline Zhong is a 80 y.o. female who presents to the emergency department with complaint of not feeling well. She says she wasn't feeling well yesterday, and feels even worse today. Reports that she hasn't had an appetite and hasn't eaten much of anything, and also has been having some diarrhea. She denies nausea, vomiting or abdominal pain. She denies chest pain, shortness of breath, cough or cold symptoms. She denies pain in all. Patient says she's been the hospital a lot lately, but has nothing else to offer for this HPI. No other complaints. Nursing Notes were reviewed and I agree. REVIEW OF SYSTEMS    (2-9 systems for level 4, 10 or more for level 5)     Constitutional:  Positive for fatigue. Negative for fever, chills, appetite change, and unexpected weight change. HENT:  Negative for congestion, ear pain, facial swelling, rhinorrhea, sinus pressure, sneezing, sore throat and trouble swallowing. Eyes:  Negative for photophobia, pain and visual disturbance. Respiratory:  Negative for cough, shortness of breath, wheezing and stridor. Cardiovascular:  Negative for chest pain, palpitations and leg swelling. Gastrointestinal:  Positive for diarrhea. Negative for nausea, vomiting, abdominal pain, constipation and blood in stool. Genitourinary:  Negative for dysuria, urgency, hematuria, flank pain, vaginal bleeding, vaginal discharge and pelvic pain. Musculoskeletal:  Negative for myalgias, arthralgias, neck pain and neck stiffness.    Neurological:  Negative for dizziness, seizures, syncope, speech difficulty, weakness, light-headedness, numbness and headaches. Psychiatric/Behavioral:  Negative for suicidal ideas, hallucinations, confusion, sleep disturbance and agitation. Except as noted above the remainder of the review of systems was reviewed and negative. PAST MEDICAL HISTORY         Diagnosis Date    Atrial fibrillation (HCC)     Carpal tunnel syndrome, bilateral     Cataract     CHF (congestive heart failure) (HCC)     Hyperlipidemia     Hypertension     MI (myocardial infarction) ([facility] [address]        SURGICAL HISTORY           Procedure Laterality Date    CARPAL TUNNEL RELEASE      CATARACT REMOVAL      EYE SURGERY      OTHER SURGICAL HISTORY  07/19/2017    Watchman procedure    SPINE SURGERY      TOTAL KNEE ARTHROPLASTY Bilateral        CURRENT MEDICATIONS       Previous Medications    ASPIRIN 81 MG CHEWABLE TABLET    Take 1 tablet by mouth daily    CALCIUM CARBONATE 600 MG TABS TABLET    Take 1 tablet by mouth every evening     CLOPIDOGREL (PLAVIX) 75 MG TABLET    TAKE 1 TABLET BY MOUTH EVERY DAY    LISINOPRIL (PRINIVIL;ZESTRIL) 40 MG TABLET    Take 40 mg by mouth every evening     OMEPRAZOLE (PRILOSEC) 40 MG CAPSULE    Take 40 mg by mouth daily as needed (acid reflux) Take 40 mg by mouth daily. POLYETHYLENE GLYCOL (GLYCOLAX) PACKET    Take 17 g by mouth daily as needed for Constipation    PRAVASTATIN (PRAVACHOL) 40 MG TABLET    Take 40 mg by mouth every evening.     SOTALOL (BETAPACE) 80 MG TABLET    Take 40 mg by mouth daily     TIZANIDINE (ZANAFLEX) 2 MG TABLET    Take 2 mg by mouth every 8 hours as needed (muscle spasms)     TRAZODONE (DESYREL) 50 MG TABLET    Take 1 tablet by mouth nightly    VENLAFAXINE (EFFEXOR XR) 75 MG EXTENDED RELEASE CAPSULE    Take 75 mg by mouth nightly        ALLERGIES     Elena advanced aspirin ex st [aspirin]    FAMILY HISTORY           Problem Relation Age of Onset    Other Mother    Mayram Solorio Other Father Family Status   Relation Name Status    Mother      Father          SOCIAL HISTORY      reports that she has never smoked. She has never used smokeless tobacco. She reports that she does not drink alcohol or use drugs. PHYSICAL EXAM    (up to 7 for level 4, 8 or more for level 5)     ED Triage Vitals   BP Temp Temp Source Pulse Resp SpO2 Height Weight   19 1342 19 1342 19 1342 19 1342 19 1342 19 1342 19 1342 19 1352   (!) 107/57 97.5 °F (36.4 °C) Oral 89 16 98 % 4' 9\" (1.448 m) 133 lb 6.1 oz (60.5 kg)       Constitutional:  Appearing well-developed and well-nourished. No distress. HENT:  Normocephalic and atraumatic. Conjunctivae and EOM are normal. Pupils are equal, round, and reactive to light. Neck:  Normal range of motion. Neck supple. No tracheal deviation present. No thyromegaly present. No cervical adenopathy. Cardiovascular:  Normal rate, rhythm irregularly irregular, normal heart sounds and intact distal pulses. Pulmonary/Chest:  Effort normal and breath sounds normal. No respiratory distress. No wheezes or rales. Abdominal:  Soft. Bowel sounds are normal. No distension, mass, tenderness, rebound or guarding. Musculoskeletal:  Normal range of motion. No edema exhibited. Neurological:  Alert and oriented to person, place, and time. No cranial nerve deficit. Skin:  Skin is warm and dry. Not diaphoretic. Psychiatric:  Normal mood, affect, behavior, judgment and thought content. DIAGNOSTIC RESULTS     RADIOLOGY:     Interpretation per the Radiologist below, if available at the time of this note:    CT ABDOMEN PELVIS WO CONTRAST   Final Result   Pancolitis, infectious or inflammatory.       RECOMMENDATIONS:   1         XR CHEST PORTABLE   Final Result   No acute findings             LABS:  Labs Reviewed   CBC WITH AUTO DIFFERENTIAL - Abnormal; Notable for the following components:       Result Value    WBC 35.3 (*) Neutrophils # 32.8 (*)     Lymphocytes # 0.7 (*)     Monocytes # 1.8 (*)     Myelocytes Relative 3 (*)     All other components within normal limits    Narrative:     Performed at:  [address] [facility]   Phone [phone]   COMPREHENSIVE METABOLIC PANEL W/ REFLEX TO MG FOR LOW K - Abnormal; Notable for the following components:    Potassium reflex Magnesium 5.4 (*)     Chloride 96 (*)     Anion Gap 17 (*)     Glucose 142 (*)     BUN 64 (*)     CREATININE 2.5 (*)     GFR Non- 18 (*)     GFR  22 (*)     Alb 2.9 (*)     Albumin/Globulin Ratio 0.8 (*)     All other components within normal limits    Narrative:     Performed at:  [address] [facility]   Phone [phone]   BRAIN NATRIURETIC PEPTIDE - Abnormal; Notable for the following components:    Pro-BNP 6,433 (*)     All other components within normal limits    Narrative:     Performed at:  [address] [facility]   Phone [phone]   URINE RT REFLEX TO CULTURE - Abnormal; Notable for the following components:    Color, UA DARK YELLOW (*)     Clarity, UA CLOUDY (*)     Bilirubin Urine MODERATE (*)     Protein, UA 30 (*)     Leukocyte Esterase, Urine SMALL (*)     All other components within normal limits    Narrative:     Performed at:  [address] [facility]   Phone [phone]   LACTATE, SEPSIS - Abnormal; Notable for the following components:    Lactic Acid, Sepsis 2.4 (*)     All other components within normal limits    Narrative:     Performed at:  [address] [facility]   Phone [phone]   MICROSCOPIC URINALYSIS - Abnormal; Notable for the following components:    Casts 3-5 Hyaline (*) ([facility] [address]    2. Acute kidney injury ([facility] [address]    3. Dehydration    4. Hyperkalemia          DISPOSITION/PLAN   DISPOSITION Decision To Admit 05/16/2019 05:28:06 PM      PATIENT REFERRED TO:  No follow-up provider specified.     DISCHARGE MEDICATIONS:  New Prescriptions    No medications on file       (Please note that portions of this note were completed with a voice recognition program.  Efforts were made to edit the dictations but occasionally words are mis-transcribed.)    Yakov Diaz, [address] [place] [address], [address] [place] [address]  05/16/19 [id number]

## 2019-05-17 LAB
ANION GAP SERPL CALCULATED.3IONS-SCNC: 17 MMOL/L (ref 3–16)
ATYPICAL LYMPHOCYTE RELATIVE PERCENT: 1 % (ref 0–6)
BANDED NEUTROPHILS RELATIVE PERCENT: 9 % (ref 0–7)
BASOPHILS ABSOLUTE: 0 K/UL (ref 0–0.2)
BASOPHILS RELATIVE PERCENT: 0 %
BUN BLDV-MCNC: 55 MG/DL (ref 7–20)
C DIFFICILE TOXIN, EIA: ABNORMAL
CALCIUM SERPL-MCNC: 7.7 MG/DL (ref 8.3–10.6)
CHLORIDE BLD-SCNC: 102 MMOL/L (ref 99–110)
CO2: 16 MMOL/L (ref 21–32)
CREAT SERPL-MCNC: 1.9 MG/DL (ref 0.6–1.2)
EOSINOPHILS ABSOLUTE: 0 K/UL (ref 0–0.6)
EOSINOPHILS RELATIVE PERCENT: 0 %
GFR AFRICAN AMERICAN: 30
GFR NON-AFRICAN AMERICAN: 25
GLUCOSE BLD-MCNC: 104 MG/DL (ref 70–99)
HCT VFR BLD CALC: 39.3 % (ref 36–48)
HEMOGLOBIN: 12.8 G/DL (ref 12–16)
LACTIC ACID, SEPSIS: 1 MMOL/L (ref 0.4–1.9)
LYMPHOCYTES ABSOLUTE: 0.3 K/UL (ref 1–5.1)
LYMPHOCYTES RELATIVE PERCENT: 0 %
MCH RBC QN AUTO: 31 PG (ref 26–34)
MCHC RBC AUTO-ENTMCNC: 32.5 G/DL (ref 31–36)
MCV RBC AUTO: 95.3 FL (ref 80–100)
METAMYELOCYTES RELATIVE PERCENT: 3 %
MONOCYTES ABSOLUTE: 1.5 K/UL (ref 0–1.3)
MONOCYTES RELATIVE PERCENT: 5 %
NEUTROPHILS ABSOLUTE: 28.4 K/UL (ref 1.7–7.7)
NEUTROPHILS RELATIVE PERCENT: 82 %
PDW BLD-RTO: 14 % (ref 12.4–15.4)
PLATELET # BLD: 229 K/UL (ref 135–450)
PLATELET SLIDE REVIEW: ADEQUATE
PMV BLD AUTO: 8.9 FL (ref 5–10.5)
POTASSIUM REFLEX MAGNESIUM: 3.6 MMOL/L (ref 3.5–5.1)
RBC # BLD: 4.12 M/UL (ref 4–5.2)
RBC # BLD: NORMAL 10*6/UL
SLIDE REVIEW: ABNORMAL
SODIUM BLD-SCNC: 135 MMOL/L (ref 136–145)
VACUOLATED NEUTROPHILS: PRESENT
WBC # BLD: 30.2 K/UL (ref 4–11)

## 2019-05-17 PROCEDURE — 97535 SELF CARE MNGMENT TRAINING: CPT

## 2019-05-17 PROCEDURE — 2580000003 HC RX 258: Performed by: FAMILY MEDICINE

## 2019-05-17 PROCEDURE — 85025 COMPLETE CBC W/AUTO DIFF WBC: CPT

## 2019-05-17 PROCEDURE — 97166 OT EVAL MOD COMPLEX 45 MIN: CPT

## 2019-05-17 PROCEDURE — 6360000002 HC RX W HCPCS: Performed by: INTERNAL MEDICINE

## 2019-05-17 PROCEDURE — 6360000002 HC RX W HCPCS: Performed by: FAMILY MEDICINE

## 2019-05-17 PROCEDURE — 1200000000 HC SEMI PRIVATE

## 2019-05-17 PROCEDURE — 36415 COLL VENOUS BLD VENIPUNCTURE: CPT

## 2019-05-17 PROCEDURE — 97530 THERAPEUTIC ACTIVITIES: CPT

## 2019-05-17 PROCEDURE — 97162 PT EVAL MOD COMPLEX 30 MIN: CPT

## 2019-05-17 PROCEDURE — 80048 BASIC METABOLIC PNL TOTAL CA: CPT

## 2019-05-17 PROCEDURE — 94760 N-INVAS EAR/PLS OXIMETRY 1: CPT

## 2019-05-17 PROCEDURE — 83605 ASSAY OF LACTIC ACID: CPT

## 2019-05-17 PROCEDURE — 6370000000 HC RX 637 (ALT 250 FOR IP): Performed by: INTERNAL MEDICINE

## 2019-05-17 PROCEDURE — 2580000003 HC RX 258: Performed by: INTERNAL MEDICINE

## 2019-05-17 PROCEDURE — 2500000003 HC RX 250 WO HCPCS: Performed by: INTERNAL MEDICINE

## 2019-05-17 PROCEDURE — 6370000000 HC RX 637 (ALT 250 FOR IP): Performed by: FAMILY MEDICINE

## 2019-05-17 PROCEDURE — 6370000000 HC RX 637 (ALT 250 FOR IP)

## 2019-05-17 PROCEDURE — 97116 GAIT TRAINING THERAPY: CPT

## 2019-05-17 RX ORDER — ACETAMINOPHEN 325 MG/1
650 TABLET ORAL EVERY 4 HOURS PRN
Status: DISCONTINUED | OUTPATIENT
Start: 2019-05-17 | End: 2019-05-22

## 2019-05-17 RX ADMIN — SOTALOL HYDROCHLORIDE 40 MG: 80 TABLET ORAL at 09:55

## 2019-05-17 RX ADMIN — VANCOMYCIN HYDROCHLORIDE 250 MG: 250 CAPSULE ORAL at 23:00

## 2019-05-17 RX ADMIN — ACETAMINOPHEN 650 MG: 325 TABLET ORAL at 18:57

## 2019-05-17 RX ADMIN — VANCOMYCIN HYDROCHLORIDE 250 MG: 250 CAPSULE ORAL at 17:34

## 2019-05-17 RX ADMIN — VENLAFAXINE HYDROCHLORIDE 75 MG: 75 CAPSULE, EXTENDED RELEASE ORAL at 23:00

## 2019-05-17 RX ADMIN — METRONIDAZOLE 500 MG: 500 INJECTION, SOLUTION INTRAVENOUS at 17:34

## 2019-05-17 RX ADMIN — CEFTRIAXONE 1 G: 1 INJECTION, POWDER, FOR SOLUTION INTRAMUSCULAR; INTRAVENOUS at 14:38

## 2019-05-17 RX ADMIN — SODIUM CHLORIDE: 9 INJECTION, SOLUTION INTRAVENOUS at 06:47

## 2019-05-17 RX ADMIN — VANCOMYCIN HYDROCHLORIDE 250 MG: 250 CAPSULE ORAL at 13:27

## 2019-05-17 RX ADMIN — TRAZODONE HYDROCHLORIDE 50 MG: 50 TABLET ORAL at 23:00

## 2019-05-17 RX ADMIN — METRONIDAZOLE 500 MG: 500 INJECTION, SOLUTION INTRAVENOUS at 09:54

## 2019-05-17 RX ADMIN — TIZANIDINE 2 MG: 4 TABLET ORAL at 18:57

## 2019-05-17 RX ADMIN — VANCOMYCIN HYDROCHLORIDE 250 MG: 250 CAPSULE ORAL at 09:54

## 2019-05-17 RX ADMIN — HEPARIN SODIUM 5000 UNITS: 5000 INJECTION INTRAVENOUS; SUBCUTANEOUS at 06:45

## 2019-05-17 RX ADMIN — METRONIDAZOLE 500 MG: 500 INJECTION, SOLUTION INTRAVENOUS at 00:28

## 2019-05-17 RX ADMIN — SODIUM CHLORIDE: 9 INJECTION, SOLUTION INTRAVENOUS at 19:00

## 2019-05-17 RX ADMIN — HEPARIN SODIUM 5000 UNITS: 5000 INJECTION INTRAVENOUS; SUBCUTANEOUS at 13:27

## 2019-05-17 RX ADMIN — HEPARIN SODIUM 5000 UNITS: 5000 INJECTION INTRAVENOUS; SUBCUTANEOUS at 23:01

## 2019-05-17 ASSESSMENT — PAIN DESCRIPTION - PROGRESSION
CLINICAL_PROGRESSION: GRADUALLY WORSENING
CLINICAL_PROGRESSION: GRADUALLY IMPROVING
CLINICAL_PROGRESSION: GRADUALLY IMPROVING
CLINICAL_PROGRESSION: GRADUALLY WORSENING
CLINICAL_PROGRESSION: GRADUALLY IMPROVING

## 2019-05-17 ASSESSMENT — PAIN SCALES - GENERAL
PAINLEVEL_OUTOF10: 7
PAINLEVEL_OUTOF10: 2
PAINLEVEL_OUTOF10: 0

## 2019-05-17 ASSESSMENT — PAIN DESCRIPTION - PAIN TYPE
TYPE: ACUTE PAIN

## 2019-05-17 ASSESSMENT — PAIN DESCRIPTION - ORIENTATION
ORIENTATION: LOWER
ORIENTATION: MID
ORIENTATION: LOWER

## 2019-05-17 ASSESSMENT — PAIN DESCRIPTION - DESCRIPTORS
DESCRIPTORS: DISCOMFORT
DESCRIPTORS: CRAMPING
DESCRIPTORS: DISCOMFORT

## 2019-05-17 ASSESSMENT — PAIN DESCRIPTION - LOCATION
LOCATION: BACK;ABDOMEN
LOCATION: ABDOMEN
LOCATION: BACK;ABDOMEN

## 2019-05-17 ASSESSMENT — PAIN - FUNCTIONAL ASSESSMENT
PAIN_FUNCTIONAL_ASSESSMENT: PREVENTS OR INTERFERES SOME ACTIVE ACTIVITIES AND ADLS
PAIN_FUNCTIONAL_ASSESSMENT: PREVENTS OR INTERFERES WITH ALL ACTIVE AND SOME PASSIVE ACTIVITIES
PAIN_FUNCTIONAL_ASSESSMENT: PREVENTS OR INTERFERES SOME ACTIVE ACTIVITIES AND ADLS

## 2019-05-17 ASSESSMENT — PAIN DESCRIPTION - FREQUENCY
FREQUENCY: CONTINUOUS

## 2019-05-17 ASSESSMENT — PAIN DESCRIPTION - ONSET
ONSET: ON-GOING

## 2019-05-17 NOTE — PLAN OF CARE
Problem: Nutrition  Goal: Optimal nutrition therapy  Outcome: Ongoing     Nutrition Problem: Inadequate oral intake  Intervention: Food and/or Nutrient Delivery: Continue current diet, Start ONS  Nutritional Goals:  Tolerate diet and consume greater than 50% of meals and supplements

## 2019-05-17 NOTE — PROGRESS NOTES
Patient resting in bed with eyes closed. Denies pain. Morning medications given without difficulty. Patient declined ordering food on the clear liquid diet. Call light and belongings within reach. Bed alarm on. Patient educated on using the call light for assistance.

## 2019-05-17 NOTE — CONSULTS
Nutrition Assessment    Type and Reason for Visit: Initial, Consult    Nutrition Recommendations:   Clear liquids, advancement per provider  Clear Liquid ONS TID  Will monitor nutritional adequacy, nutrition-related labs, weights, BMs, and clinical progress     Nutrition Assessment: Pt with nutrition risk r/t poor po intake for last several days, poor intake of clear liquids at this time. Wt actually trending up, unsure of accuracy and will continue to monitor trends. Further compromise possible r/t alteration in GI function, diarrhea, agitation. Will monitor for diet advancement and order supplements. Malnutrition Assessment:  · Malnutrition Status: At risk for malnutrition  · Context: Acute illness or injury  · Findings of the 6 clinical characteristics of malnutrition (Minimum of 2 out of 6 clinical characteristics is required to make the diagnosis of moderate or severe Protein Calorie Malnutrition based on AND/ASPEN Guidelines):  1. Energy Intake-Greater than 75% of estimated energy requirement, Greater than or equal to 5 days    2. Weight Loss-No significant weight loss,    3. Fat Loss-No significant subcutaneous fat loss,    4. Muscle Loss-No significant muscle mass loss,    5. Fluid Accumulation-No significant fluid accumulation,    6.   Strength-Not measured    Nutrition Risk Level: High    Nutrient Needs:  · Estimated Daily Total Kcal: 9950-9196 kcal (20-25 kcal/kg ABW)  · Estimated Daily Protein (g): 74-81 gm (1.2-1.3 gm/kg ABW)  · Estimated Daily Total Fluid (ml/day): less than 64 oz     Nutrition Diagnosis:   · Problem: Inadequate oral intake  · Etiology: related to Insufficient energy/nutrient consumption     Signs and symptoms:  as evidenced by Diet history of poor intake    Objective Information:  · Nutrition-Focused Physical Findings: Wt trending up   · Wound Type: None  · Current Nutrition Therapies:  · Oral Diet Orders: Clear Liquid   · Oral Diet intake: (120 ml )  · Oral Nutrition Supplement (ONS) Orders: None  · Anthropometric Measures:  · Ht: 4' 9\" (144.8 cm)   · Current Body Wt: 136 lb (61.7 kg)  · % Weight Change:  ,  trending up recently  · Ideal Body Wt: 94 lb (42.6 kg),  · BMI Classification: BMI 25.0 - 29.9 Overweight    Nutrition Interventions:   Continue current diet, Start ONS  Continued Inpatient Monitoring    Nutrition Evaluation:   · Evaluation: Goals set   · Goals:  Tolerate diet and consume greater than 50% of meals and supplements     · Monitoring: Meal Intake, Supplement Intake, Diet Tolerance, Weight, Mental Status/Confusion, Diarrhea      Electronically signed by Rosy Nicole RD, LD on 5/17/19 at 12:54 PM    Contact Number: [phone]

## 2019-05-17 NOTE — PLAN OF CARE
Problem: Falls - Risk of:  Goal: Will remain free from falls  Description  Will remain free from falls  5/17/2019 1814 by Fabrizio Long RN  Outcome: Ongoing  Note:   Fall risk assessment completed. Fall precautions in place. Call light within reach. Pt educated on calling for assistance before getting up. Walkway free of clutter. Will continue to monitor. Electronically signed by Fabrizio Long RN on 5/17/2019 at 6:14 PM      Problem: Risk for Impaired Skin Integrity  Goal: Tissue integrity - skin and mucous membranes  Description  Structural intactness and normal physiological function of skin and  mucous membranes. 5/17/2019 1814 by Fabrizio Long RN  Outcome: Ongoing  Note:   Will monitor skin and mucous membranes. Will turn patient every 2 hours, monitor for friction and sheering, and change dressings as needed. Will preform skin assessment every shift.    Electronically signed by Fabrizio Long RN on 5/17/2019 at 6:14 PM      Problem: Nutrition  Goal: Optimal nutrition therapy  5/17/2019 1814 by Fabrizio Long RN  Outcome: Ongoing

## 2019-05-17 NOTE — PROGRESS NOTES
Clinical Pharmacy Note  Renal Dose Adjustment    Sheila Arambula is receiving the following renally eliminated medications: Lovenox and MOM. Based on the patient's estimated creatinine clearance of 14.6 ml/min and urine output, the Lovenox has been changed to heparin sq,and the MOM was discontinued, per policy. Pharmacy will continue to monitor and adjust dose as needed for changes in renal function.       Edy Kennedy 5/16/2019 8:23 PM

## 2019-05-17 NOTE — PROGRESS NOTES
Physical Therapy    Facility/Department: [facility] 3W ORTHOPEDICS  Initial Assessment  This note serves as patient discharge summary if pt discharges prior to next PT visit    NAME: Kris Chung  : 1930  MRN: [id number]    Date of Service: 2019    Discharge Recommendations:  Continue to assess pending progress, Patient would benefit from continued therapy after discharge, 3-5 sessions per week   Kris Chung scored a 15/24 on the AM-PAC short mobility form. Current research shows that an AM-PAC score of 17 or less is typically not associated with a discharge to the patient's home setting. Based on the patients AM-PAC score and their current functional mobility deficits, it is recommended that the patient have 3-5 sessions per week of Physical Therapy at d/c to increase the patients independence. PT Equipment Recommendations  Other: will cont to assess    Assessment   Body structures, Functions, Activity limitations: Decreased functional mobility ; Decreased balance;Decreased strength;Decreased cognition  Assessment: Kris Chung is a 80 y.o. female who presents to the emergency department with complaint of not feeling well. Reports that she hasn't had an appetite and hasn't eaten much of anything, and also has been having some diarrhea. Work up reveals pancolitis and C Diff. PMHx: Afib, MI, HTN, B TKA, Watchman procedure. PTA, pt lived alone in home w 2 DASHAWN and was independent w ADLs/mobility using RW. Pt receives help from son and aide for IADLs. Pt here 1 month prior and d/c to SNF- home w home therapy prior to this new hosp stay; prior notes show AMS and decr cog status w frequent hosp/SNF stays. At 19 session, pt was mod A for bed mobility and min-mod A for transfers w RW. Pt amb 5' from bed to [facility] & BSC to BS chair w RW and min A. Pt had noticeable unsteadiness throughout mobility. Pt was limited d/t her incontinence and decr cog.  Pt currently unable to return home d/t her decr mobility and poor safety awareness. Recommend continued skilled therapy upon d/c to progress independence, safety and tolerance for functional mobility. Will cont to assess. Prognosis: Good  Decision Making: Medium Complexity  Clinical Presentation: evolving  Patient Education: PT POC, not getting up without nurse, importance of mobility  Barriers to Learning: cog  REQUIRES PT FOLLOW UP: Yes  Activity Tolerance  Activity Tolerance: Patient Tolerated treatment well;Treatment limited secondary to medical complications (free text)  Activity Tolerance: Pt limited d/t incontinence and constant need to use [facility]       Patient Diagnosis(es): The primary encounter diagnosis was Pancolitis ([facility] [address] Diagnoses of Acute kidney injury ([facility] [address] Dehydration, and Hyperkalemia were also pertinent to this visit. has a past medical history of Atrial fibrillation ([facility] [address] Carpal tunnel syndrome, bilateral, Cataract, CHF (congestive heart failure) ([facility] [address] Clostridium difficile diarrhea, Hyperlipidemia, Hypertension, and MI (myocardial infarction) ([facility] [address] has a past surgical history that includes Total knee arthroplasty (Bilateral); Spine surgery; Carpal tunnel release; eye surgery; Cataract removal; and other surgical history (07/19/2017). Restrictions  Restrictions/Precautions  Restrictions/Precautions: Fall Risk  Position Activity Restriction  Other position/activity restrictions: c-diff contact precautions  Vision/Hearing  Vision: Within Functional Limits  Hearing: Within functional limits     Subjective  General  Chart Reviewed: Yes  Patient assessed for rehabilitation services?: Yes  Additional Pertinent Hx: Ros Harvey is a 80 y.o. female who presents to the emergency department with complaint of not feeling well. Reports that she hasn't had an appetite and hasn't eaten much of anything, and also has been having some diarrhea. Work up reveals pancolitis and C Diff. PMHx: Afib, MI, HTN, B TKA, Watchman procedure.    Response To Previous Treatment: Not applicable  Family / Caregiver Present: No  Referring Practitioner: Ariel Hermosillo DO  Subjective  Subjective: Pt has no reports of pain at start of session, complains of pain in bowels are BM at [facility]. Pt agreeable to therapy but disoriented and slightly confused throughout. Needed multiple cues to answer questions  Pain Screening  Patient Currently in Pain: No    Orientation  Orientation  Overall Orientation Status: Impaired  Orientation Level: Disoriented to situation;Oriented to person;Oriented to place; Disoriented to time  Social/Functional History  Social/Functional History  Lives With: Alone  Type of Home: House  Home Layout: Laundry in basement, Able to Live on Main level with bedroom/bathroom  Home Access: Stairs to enter without rails  Entrance Stairs - Number of Steps: 2 DASHAWN with post to hold onto  Bathroom Shower/Tub: Tub/Shower unit  [employer]: Standard  Bathroom Equipment: Grab bars in shower  Home Equipment: Rolling walker(hurry cane)  Receives Help From: Family(son nearby)  ADL Assistance: [facility] Responsibilities: No(son helps)  Ambulation Assistance: Independent(RW)  Transfer Assistance: Independent  Active : No  IADL Comments: son helps with cleaning, cooking ,grocery shopping; no outside help and no home therapy; was here 1 month ago, went to SNF before d/c home; reports 1 fall but cannot remember; no alert button    Objective  AROM RLE (degrees)  RLE AROM: WFL  AROM LLE (degrees)  LLE AROM : WFL  LLE General AROM: Except for L DF ~10 deg from neutral  Strength RLE  Strength RLE: WFL  Comment: 3+/5 hip flex/abd/add and knee flex, 4/5 knee ext, DF and PF  Strength LLE  Strength LLE: WFL  Comment: 3+/5 hip flex/abd/add and knee flex, 4/5 knee ext, DF and PF  Sensation  Overall Sensation Status: WFL  Bed mobility  Supine to Sit: Moderate assistance(HOB elevated use of bed rail)  Sit to Supine: Unable to assess(pt in chair at end of session)  Transfers  Sit to Stand: Minimal Assistance; Moderate Assistance(cues for hand placement)  Stand to sit: Minimal Assistance; Moderate Assistance(cues for hand placement)  Comment: Pt required mod A for sit<>stand from [facility] d/t lower height; pt min A for sit<>stand from bed and from BS chair   Ambulation  Ambulation?: Yes  Ambulation 1  Surface: level tile  Device: Rolling Walker  Assistance: Minimal assistance  Quality of Gait: short shuffling steps, wide BRIGITTE, lateral trunk sway, B foot clearance, unsteady throughout  Distance: 5' BSC to BS chair  Comments: Pt required cues to keep walker close during transfers  Balance  Sitting - Static: Good;-  Sitting - Dynamic: Good;-  Standing - Static: Fair;+  Standing - Dynamic: Fair  Comments: SBA for sitting at EOB and sitting at [facility]; pt required CGA for stance at [facility] during therapist provided pericare, required min A during amb to chair d/t unsteadiness, no LOB noted    Plan   Plan  Times per week: 3-5x in hosp  Current Treatment Recommendations: Functional Mobility Training, Transfer Training, Gait Training, Stair training, Patient/Caregiver Education & Training, Safety Education & Training  Safety Devices  Type of devices: Call light within reach, Gait belt, Left in chair, Nurse notified, Chair alarm in place, Patient at risk for falls(RN [place] in room upon exit)        AM-PAC Score  AM-PAC Inpatient Mobility Raw Score : 15  AM-PAC Inpatient T-Scale Score : 39.45  Mobility Inpatient CMS 0-100% Score: 57.7  Mobility Inpatient CMS G-Code Modifier : CK    Goals  Short term goals  Time Frame for Short term goals: By acute d/c  Short term goal 1: Bed mobility CGA  Short term goal 2: Transfers to RW CGA  Short term goal 3: Amb 48' w RW and CGA  Short term goal 4: Ascend/descend 2 steps w CGA and AD as needed  Patient Goals   Patient goals : \"to go home\"     Therapy Time   Individual Concurrent Group Co-treatment   Time In 1405         Time Out 1444         Minutes Alvaro 111, SPT  I attest that I was present for and made a skilled & mindful clinical judgement during the evaluation and/or treatment of this patient on 5/17/2019  Electronically signed by Silvia Moreno, [address] [place] [address] Drive on 5/17/2019 at 3:08 PM

## 2019-05-17 NOTE — PROGRESS NOTES
Occupational Therapy   Occupational Therapy Initial Assessment  Date: 2019   Patient Name: Michaela Uribe  MRN: [id number]     : 1930    Date of Service: 2019    Discharge Recommendations:  Patient would benefit from continued therapy after discharge, 3-5 sessions per week      Michaela Uribe scored a 13/24 on the AM-PAC ADL Inpatient form. Current research shows that an AM-PAC score of 17 or less is typically not associated with a discharge to the patient's home setting. Based on the patients AM-PAC score and their current ADL deficits, it is recommended that the patient have 3-5 sessions per week of Occupational Therapy at d/c to increase the patients independence. Assessment   Performance deficits / Impairments: Decreased functional mobility ; Decreased ADL status; Decreased strength;Decreased safe awareness;Decreased cognition;Decreased endurance;Decreased balance;Decreased high-level IADLs  Assessment: Pt is a 80 y.o. female admitted with c diff pancolitis, sepsis, SHANTEL. At baseline, pt lives alone, mod I ADLs and fxl mobility using walker, and son assists with IADLs. Pt currently functioning well below baseline d/t the above deficits, today needing total A toileting and LB dressing, min A fxl mobility/transfers with walker. Pt easily fatigued with minimal activity, also confused and needed verbal cues for safety and attention to task. Will cont to see on acute to address the above limitations and maximize pt's independence. Pt's AM-PAC score indicates need for ongoing skilled OT at d/c.    Prognosis: Good  Decision Making: Medium Complexity  History: see above  Exam: decreased ADL status, strength, balance/fxl mobiltiy, cognition, endurance  Assistance / Modification: anticipate overall max A for ADLs  Patient Education: Pt educated on the role of OT, POC, safety/transfers, orientation information   Barriers to Learning: cognition   REQUIRES OT FOLLOW UP: Yes  Activity Tolerance  Activity Tolerance: Patient limited by fatigue;Treatment limited secondary to decreased cognition  Safety Devices  Safety Devices in place: Yes  Type of devices: Call light within reach;Gait belt;Left in chair;Nurse notified; Chair alarm in place           Patient Diagnosis(es): The primary encounter diagnosis was Pancolitis [facility]). Diagnoses of Acute kidney injury ([facility] [address] Dehydration, and Hyperkalemia were also pertinent to this visit. has a past medical history of Atrial fibrillation ([address] Carpal tunnel syndrome, bilateral, Cataract, CHF (congestive heart failure) ([facility] [address] Clostridium difficile diarrhea, Hyperlipidemia, Hypertension, and MI (myocardial infarction) ([facility] [address] has a past surgical history that includes Total knee arthroplasty (Bilateral); Spine surgery; Carpal tunnel release; eye surgery; Cataract removal; and other surgical history (07/19/2017). Restrictions  Restrictions/Precautions  Restrictions/Precautions: Fall Risk  Position Activity Restriction  Other position/activity restrictions: c-diff contact precautions    Subjective   General  Chart Reviewed: Yes  Patient assessed for rehabilitation services?: Yes  Additional Pertinent Hx: Pt is an 81 yo female admitted with fatigue and diarrhea. She was recently on Abx for a UTI. CT abdomen showed pancolitis. Family / Caregiver Present: No  Referring Practitioner: Song Gordon  Diagnosis: c diff pancolitis, sepsis, SHANTEL  Subjective  Subjective: Pt met b/s for OT eval/tx with PT. Pt supine in bed on arrival, agreeable to participate in therapy. Pt denies pain at rest, but later reports pain in her \"bowels\" and buttocks.       Social/Functional History  Social/Functional History  Lives With: Alone  Type of Home: House  Home Layout: Laundry in basement, Able to Live on Main level with bedroom/bathroom  Home Access: Stairs to enter without rails  Entrance Stairs - Number of Steps: 2 DASHAWN with post to hold onto  Bathroom Shower/Tub: Tub/Shower unit  [employer]: repetition  Attention Span: Attends with cues to redirect; Difficulty dividing attention  Memory: Decreased short term memory;Decreased recall of recent events  Safety Judgement: Decreased awareness of need for safety  Problem Solving: Decreased awareness of errors;Assistance required to identify errors made;Assistance required to generate solutions  Insights: Decreased awareness of deficits  Initiation: Requires cues for all  Sequencing: Requires cues for all     Sensation  Overall Sensation Status: WFL        LUE AROM (degrees)  LUE AROM : WFL  RUE AROM (degrees)  RUE AROM : WFL     LUE Strength  Gross LUE Strength: Exceptions to [facility]  LUE Strength Comment: grossly 3+/5 throughout   RUE Strength  Gross RUE Strength: Exceptions to [facility]  RUE Strength Comment: grossly 3+/5 throughout                    Plan   Plan  Times per week: 3-5  Times per day: Daily  Current Treatment Recommendations: Strengthening, Balance Training, Functional Mobility Training, Endurance Training, Cognitive Reorientation, Safety Education & Training, Self-Care / ADL, Cognitive/Perceptual Training         OutComes Score    How much help for putting on and taking off regular lower body clothing?: Total  How much help for Bathing?: A Lot  How much help for Toileting?: Total  How much help for putting on and taking off regular upper body clothing?: A Lot  How much help for taking care of personal grooming?: A Little  How much help for eating meals?: None  AM-[facility] Inpatient Daily Activity Raw Score: 13  AM-PAC Inpatient ADL T-Scale Score : 32.03  ADL Inpatient CMS 0-100% Score: 63.03  ADL Inpatient CMS G-Code Modifier : CL                                               AM-PAC Score        AM-[facility] Inpatient Daily Activity Raw Score: 13  AM-PAC Inpatient ADL T-Scale Score : 32.03  ADL Inpatient CMS 0-100% Score: 63.03  ADL Inpatient CMS G-Code Modifier : CL    Goals  Short term goals  Time Frame for Short term goals: Prior to d/c;  Short term goal 1: Pt

## 2019-05-17 NOTE — PROGRESS NOTES
Orders to change IVF rate to 75 ml/hr from 100 ml/hr if patient is drinking an efficient amount of fluids. Patient has had a decreased appetite most of shift and has had little input. Kept IVF at 100ml/hr. Will continue to monitor.

## 2019-05-17 NOTE — CARE COORDINATION
Sw spoke with patient regarding dc needs. Patient reported that she lives alone but her son and daughter assist her as needed (son lives next door per chart review). She has a walker and is able to manage her own personal care. Patient reported that she has [facility] home care. Sw spoke with patient about resuming home care and snf placement. Patient only wants to return home--no snf. Sw made referral to [address] [place] [address] liaison.      Electronically signed by Paty Paige on 5/17/2019 at 4:17 PM

## 2019-05-17 NOTE — PROGRESS NOTES
K 5.4* 3.6   CL 96* 102   CO2 23 16*   BUN 64* 55*   CREATININE 2.5* 1.9*     Mag: No results for input(s): MAG in the last 72 hours. Phos:   Lab Results   Component Value Date    PHOS 3.8 02/05/2014     No components found for: GLU    LIVER PROFILE:   Recent Labs     05/16/19  1436   AST 19   ALT 13   BILITOT 0.3   ALKPHOS 106     PT/INR: No results for input(s): PROTIME, INR in the last 72 hours. APTT: No results for input(s): APTT in the last 72 hours. UA:  Recent Labs     05/16/19  1547   COLORU DARK YELLOW*   PHUR 5.0   LABCAST 3-5 Hyaline*   WBCUA 3   RBCUA 2   CLARITYU CLOUDY*   SPECGRAV 1.023   LEUKOCYTESUR SMALL*   UROBILINOGEN 1.0   BILIRUBINUR MODERATE*   BLOODU Negative   GLUCOSEU Negative       Invalid input(s): ABG  Lab Results   Component Value Date    CALCIUM 7.7 (L) 05/17/2019    PHOS 3.8 02/05/2014       Assessment:    Active Problems:    C. difficile colitis  Resolved Problems:    * No resolved hospital problems. [facility] course: An 79 yo female admitted with fatigue and diarrhea. She was recently on Abx for a UTI. CT abdomen showed pancolitis.     Plan:  c diff pancolitis   - c diff +, stool studies    - blood and urine cultures  - on po vanc, flagyl iv    - clear liquid diet  - ivf     Sepsis, POA  - with criteria: leukocytosis, lactic acidosis, tachycardia, tachypnea; source is c diff, possible uti  - cultures, Abx as above  - IVF  - recheck lactate normalized    SHANTEL -improving with ivf  - crt baseline 1-1.3, on admission 2.5 -> 1.9  - prerenal, due to dehydration  - IVF  - Avoid nephrotoxins  - follow renal function tests; if no improvement will get renal US     Chronic Atrial Fibrillation   - currently rate controlled  - continue sotalol  - not on AC    CHF  - compensated  - bnp 6400  - cont home meds    CAD  - stable  - cont ASA, statin, BB      Code status:  full  DVT prophylaxis: [] Lovenox  [x] SQ Heparin  [] SCDs because of  [] warfarin/oral direct thrombin inhibitor [] Encourage ambulation      Disposition:  [] Home [] Rehab [] Psych [] SNF  [] LTAC  [] Transfer to ICU  [] Transfer to PCU [] Other: in pt    Electronically signed by Sal Leonard DO on 5/17/2019 at 8:34 AM

## 2019-05-17 NOTE — PLAN OF CARE
Patient is ambulatory and requires frequent cleaning of incontinence. Skin barrier cream applied. bed is low wheels locked and call light within reach. Fall precautions in place.

## 2019-05-18 LAB
ANION GAP SERPL CALCULATED.3IONS-SCNC: 15 MMOL/L (ref 3–16)
BUN BLDV-MCNC: 57 MG/DL (ref 7–20)
CALCIUM SERPL-MCNC: 7.7 MG/DL (ref 8.3–10.6)
CHLORIDE BLD-SCNC: 109 MMOL/L (ref 99–110)
CO2: 15 MMOL/L (ref 21–32)
CREAT SERPL-MCNC: 1.4 MG/DL (ref 0.6–1.2)
GFR AFRICAN AMERICAN: 43
GFR NON-AFRICAN AMERICAN: 35
GLUCOSE BLD-MCNC: 75 MG/DL (ref 70–99)
HCT VFR BLD CALC: 41.7 % (ref 36–48)
HEMOGLOBIN: 13.7 G/DL (ref 12–16)
MCH RBC QN AUTO: 31.9 PG (ref 26–34)
MCHC RBC AUTO-ENTMCNC: 32.7 G/DL (ref 31–36)
MCV RBC AUTO: 97.4 FL (ref 80–100)
PDW BLD-RTO: 14.6 % (ref 12.4–15.4)
PLATELET # BLD: 236 K/UL (ref 135–450)
PMV BLD AUTO: 8.7 FL (ref 5–10.5)
POTASSIUM SERPL-SCNC: 3.9 MMOL/L (ref 3.5–5.1)
RBC # BLD: 4.28 M/UL (ref 4–5.2)
SODIUM BLD-SCNC: 139 MMOL/L (ref 136–145)
URINE CULTURE, ROUTINE: NORMAL
WBC # BLD: 31.3 K/UL (ref 4–11)

## 2019-05-18 PROCEDURE — 94760 N-INVAS EAR/PLS OXIMETRY 1: CPT

## 2019-05-18 PROCEDURE — 2500000003 HC RX 250 WO HCPCS: Performed by: INTERNAL MEDICINE

## 2019-05-18 PROCEDURE — 80048 BASIC METABOLIC PNL TOTAL CA: CPT

## 2019-05-18 PROCEDURE — 85027 COMPLETE CBC AUTOMATED: CPT

## 2019-05-18 PROCEDURE — 2580000003 HC RX 258: Performed by: FAMILY MEDICINE

## 2019-05-18 PROCEDURE — 6360000002 HC RX W HCPCS: Performed by: FAMILY MEDICINE

## 2019-05-18 PROCEDURE — 6360000002 HC RX W HCPCS: Performed by: INTERNAL MEDICINE

## 2019-05-18 PROCEDURE — 6370000000 HC RX 637 (ALT 250 FOR IP): Performed by: FAMILY MEDICINE

## 2019-05-18 PROCEDURE — 2580000003 HC RX 258: Performed by: INTERNAL MEDICINE

## 2019-05-18 PROCEDURE — 36415 COLL VENOUS BLD VENIPUNCTURE: CPT

## 2019-05-18 PROCEDURE — 1200000000 HC SEMI PRIVATE

## 2019-05-18 PROCEDURE — 6370000000 HC RX 637 (ALT 250 FOR IP): Performed by: INTERNAL MEDICINE

## 2019-05-18 PROCEDURE — 6370000000 HC RX 637 (ALT 250 FOR IP)

## 2019-05-18 RX ORDER — HYDROCODONE BITARTRATE AND ACETAMINOPHEN 5; 325 MG/1; MG/1
1 TABLET ORAL EVERY 6 HOURS PRN
Status: DISCONTINUED | OUTPATIENT
Start: 2019-05-18 | End: 2019-05-22

## 2019-05-18 RX ORDER — DEXTROSE AND SODIUM CHLORIDE 5; .45 G/100ML; G/100ML
INJECTION, SOLUTION INTRAVENOUS CONTINUOUS
Status: DISCONTINUED | OUTPATIENT
Start: 2019-05-18 | End: 2019-05-20

## 2019-05-18 RX ADMIN — SODIUM CHLORIDE: 9 INJECTION, SOLUTION INTRAVENOUS at 06:08

## 2019-05-18 RX ADMIN — VANCOMYCIN HYDROCHLORIDE 250 MG: 250 CAPSULE ORAL at 17:14

## 2019-05-18 RX ADMIN — HYDROCODONE BITARTRATE AND ACETAMINOPHEN 1 TABLET: 5; 325 TABLET ORAL at 19:47

## 2019-05-18 RX ADMIN — METRONIDAZOLE 500 MG: 500 INJECTION, SOLUTION INTRAVENOUS at 09:21

## 2019-05-18 RX ADMIN — HEPARIN SODIUM 5000 UNITS: 5000 INJECTION INTRAVENOUS; SUBCUTANEOUS at 15:22

## 2019-05-18 RX ADMIN — HEPARIN SODIUM 5000 UNITS: 5000 INJECTION INTRAVENOUS; SUBCUTANEOUS at 06:06

## 2019-05-18 RX ADMIN — SOTALOL HYDROCHLORIDE 40 MG: 80 TABLET ORAL at 09:21

## 2019-05-18 RX ADMIN — DEXTROSE AND SODIUM CHLORIDE: 5; 450 INJECTION, SOLUTION INTRAVENOUS at 09:21

## 2019-05-18 RX ADMIN — Medication 10 ML: at 22:53

## 2019-05-18 RX ADMIN — VANCOMYCIN HYDROCHLORIDE 250 MG: 250 CAPSULE ORAL at 22:52

## 2019-05-18 RX ADMIN — VANCOMYCIN HYDROCHLORIDE 250 MG: 250 CAPSULE ORAL at 09:21

## 2019-05-18 RX ADMIN — ACETAMINOPHEN 650 MG: 325 TABLET ORAL at 06:23

## 2019-05-18 RX ADMIN — HYDROCODONE BITARTRATE AND ACETAMINOPHEN 1 TABLET: 5; 325 TABLET ORAL at 12:39

## 2019-05-18 RX ADMIN — METRONIDAZOLE 500 MG: 500 INJECTION, SOLUTION INTRAVENOUS at 01:44

## 2019-05-18 RX ADMIN — CEFTRIAXONE 1 G: 1 INJECTION, POWDER, FOR SOLUTION INTRAMUSCULAR; INTRAVENOUS at 15:22

## 2019-05-18 RX ADMIN — VENLAFAXINE HYDROCHLORIDE 75 MG: 75 CAPSULE, EXTENDED RELEASE ORAL at 22:52

## 2019-05-18 RX ADMIN — DEXTROSE AND SODIUM CHLORIDE: 5; 450 INJECTION, SOLUTION INTRAVENOUS at 23:00

## 2019-05-18 RX ADMIN — TRAZODONE HYDROCHLORIDE 50 MG: 50 TABLET ORAL at 22:52

## 2019-05-18 RX ADMIN — VANCOMYCIN HYDROCHLORIDE 250 MG: 250 CAPSULE ORAL at 12:39

## 2019-05-18 RX ADMIN — HEPARIN SODIUM 5000 UNITS: 5000 INJECTION INTRAVENOUS; SUBCUTANEOUS at 22:54

## 2019-05-18 RX ADMIN — METRONIDAZOLE 500 MG: 500 INJECTION, SOLUTION INTRAVENOUS at 17:14

## 2019-05-18 ASSESSMENT — PAIN SCALES - GENERAL
PAINLEVEL_OUTOF10: 10
PAINLEVEL_OUTOF10: 7
PAINLEVEL_OUTOF10: 0
PAINLEVEL_OUTOF10: 10
PAINLEVEL_OUTOF10: 5
PAINLEVEL_OUTOF10: 7
PAINLEVEL_OUTOF10: 4

## 2019-05-18 ASSESSMENT — PAIN DESCRIPTION - DESCRIPTORS
DESCRIPTORS: ACHING
DESCRIPTORS: ACHING
DESCRIPTORS: DISCOMFORT
DESCRIPTORS: ACHING
DESCRIPTORS: ACHING

## 2019-05-18 ASSESSMENT — PAIN DESCRIPTION - ORIENTATION
ORIENTATION: RIGHT;LEFT

## 2019-05-18 ASSESSMENT — PAIN DESCRIPTION - LOCATION
LOCATION: ABDOMEN;BACK;HIP
LOCATION: BACK;ABDOMEN
LOCATION: BACK;HIP
LOCATION: ABDOMEN;BACK;HIP
LOCATION: BACK;HIP
LOCATION: BACK;HIP

## 2019-05-18 ASSESSMENT — PAIN DESCRIPTION - PROGRESSION
CLINICAL_PROGRESSION: GRADUALLY WORSENING
CLINICAL_PROGRESSION: NOT CHANGED
CLINICAL_PROGRESSION: GRADUALLY WORSENING
CLINICAL_PROGRESSION: GRADUALLY IMPROVING
CLINICAL_PROGRESSION: GRADUALLY WORSENING
CLINICAL_PROGRESSION: GRADUALLY WORSENING

## 2019-05-18 ASSESSMENT — PAIN DESCRIPTION - ONSET
ONSET: ON-GOING

## 2019-05-18 ASSESSMENT — PAIN DESCRIPTION - PAIN TYPE
TYPE: ACUTE PAIN;CHRONIC PAIN
TYPE: CHRONIC PAIN
TYPE: CHRONIC PAIN
TYPE: ACUTE PAIN
TYPE: CHRONIC PAIN

## 2019-05-18 ASSESSMENT — PAIN DESCRIPTION - FREQUENCY
FREQUENCY: CONTINUOUS

## 2019-05-18 NOTE — PROGRESS NOTES
Pt up from bed to [facility] with stand pivot. Pt passed urine. Pericare given and returned to bed. Call light in reach. Bed alarm on.

## 2019-05-18 NOTE — PROGRESS NOTES
Pt awake and AAO lying in bed. Salazar pain. Pt admitted for dehydration and cdiff. Contact Plus precautions in place. Lungs clear and decreased. No sob or cough. On RA> On telemetry. Belly round and soft with active BS. Pt has had loose stools. On clear liquid diet. 0.9% NS @ 100ml/hr infusing without difficulty into R AC PIV. Pt is one IV ATB. Site and dressing WDL. Pull-up dry at this time. No edema noted. Call light in reach. Bed alarm on.

## 2019-05-18 NOTE — PLAN OF CARE
Problem: Bowel/Gastric:  Goal: Occurrences of diarrhea will decrease  Description  Occurrences of diarrhea will decrease  Outcome: Ongoing  Note:   Pt admitted with cdiff. Continues with loose stools. On Flagyl and Vanc. Monitor labs. Continue IV ATB. Maintain contact plus isolation and hand hygiene.

## 2019-05-18 NOTE — PLAN OF CARE
Problem: Falls - Risk of:  Goal: Will remain free from falls  Description  Will remain free from falls  Outcome: Ongoing  Note:   Pt free from injury or falls at this time, fall precautions in place, bed in low position, side rail up x2, Herrera Fall Risk: High (45 and higher), bed alarm on, reoriented to room and call light, reminded not to get up without assistance, call light in reach, will continue to monitor. Pt verbalizes understanding of fall risk procedures. Goal: Absence of physical injury  Description  Absence of physical injury  Outcome: Ongoing  Note:   Pt has not incurred any type of physical injury this shift. Problem: Risk for Impaired Skin Integrity  Goal: Tissue integrity - skin and mucous membranes  Description  Structural intactness and normal physiological function of skin and  mucous membranes. Outcome: Ongoing  Note:   Pt has blanchable redness to buttocks from incontinence. Barrier ointment applied. No other areas of skin breakdown noted. Will monitor for changes. Problem: Nutrition  Goal: Optimal nutrition therapy  Outcome: Ongoing  Note:   Pt with poor appetite. Breakfast tray ordered, but pt not eating. Will encourage pt with PO intake. Problem: Bowel/Gastric:  Goal: Occurrences of diarrhea will decrease  Description  Occurrences of diarrhea will decrease  5/18/2019 1116 by Mars Martel RN  Outcome: Ongoing  Note:   Pt continues to have intermittent diarrhea. She is receiving scheduled IV abx. Will monitor. 5/18/2019 0446 by Arnie Keatnig RN  Outcome: Ongoing  Note:   Pt admitted with cdiff. Continues with loose stools. On Flagyl and Vanc. Monitor labs. Continue IV ATB. Maintain contact plus isolation and hand hygiene.       Problem: Fluid Volume:  Goal: Will show no signs and symptoms of electrolyte imbalance  Description  Will show no signs and symptoms of electrolyte imbalance  Outcome: Ongoing  Note:   Pt's calcium slightly low, but all other electrolytes are WDL this shift. Problem: Physical Regulation:  Goal: Prevent transmision of infection  Description  Prevent transmision of infection  Outcome: Ongoing  Note:   Contact Plus isolation continued for + C. Diff, proper PPE being utilized. Goal: Ability to avoid or minimize complications of infection will improve  Description  Ability to avoid or minimize complications of infection will improve  Outcome: Ongoing  Note:   Contact Plus isolation continued for + C. Diff, proper PPE being utilized. IV abx administered as ordered. Goal: Signs and symptoms of infection will decrease  Description  Signs and symptoms of infection will decrease  Outcome: Ongoing  Note:   Pt continues to have intermittent diarrhea. She has been afebrile. Receiving IV abx as ordered. Problem: Skin Integrity:  Goal: Risk for impaired skin integrity will decrease  Description  Risk for impaired skin integrity will decrease  Outcome: Ongoing  Note:   Pt has blanchable redness to buttocks r/t incontinence. Barrier ointment applied. Will monitor for changes. Problem: Pain:  Goal: Pain level will decrease  Description  Pain level will decrease  Outcome: Ongoing  Note:   Pt c/o chronic back and hip pain rated 10/10. She only has PRN Tylenol ordered. Requesting stronger pain medication from MD. Awaiting orders. Goal: Control of acute pain  Description  Control of acute pain  Outcome: Ongoing  Note:   Pt has had no c/o acute pain issues so far this shift. Will monitor and medicate as needed. Goal: Control of chronic pain  Description  Control of chronic pain  Outcome: Ongoing  Note:   Pt c/o chronic back and hip pain rated 10/10. She only has PRN Tylenol ordered. Requesting stronger pain medication from MD. Awaiting orders. Lab Facility: [id number] Type Of Destruction Used: Curettage Bill For Surgical Tray: no Size Of Lesion In Cm: 0.6 Electrodesiccation Text: The wound bed was treated with electrodesiccation after the biopsy was performed. X Size Of Lesion In Cm: 0 Electrodesiccation And Curettage Text: The wound bed was treated with electrodesiccation and curettage after the biopsy was performed. Depth Of Biopsy: dermis Hemostasis: Aluminum Chloride Dressing: pressure dressing Biopsy Type: H and E Path Notes (To The Dermatopathologist): Check margins. Post-Care Instructions: I reviewed with the patient in detail post-care instructions. Patient is to keep the biopsy site dry overnight, and then apply bacitracin twice daily until healed. Patient may apply hydrogen peroxide soaks to remove any crusting. Curettage Text: The wound bed was treated with curettage after the biopsy was performed. Was A Bandage Applied: Yes Detail Level: Detailed Anesthesia Type: 1% lidocaine without epinephrine Billing Type: Third-Party Bill Silver Nitrate Text: The wound bed was treated with silver nitrate after the biopsy was performed. Notification Instructions: Patient will be notified of biopsy results. However, patient instructed to call the office if not contacted within 2 weeks. Cryotherapy Text: The wound bed was treated with cryotherapy after the biopsy was performed. Consent: Written consent was obtained and risks were reviewed including but not limited to scarring, infection, bleeding, scabbing, incomplete removal, nerve damage and allergy to anesthesia. Anesthesia Volume In Cc: 1 Lab: [id number] Wound Care: Mupirocin Biopsy Method: 15 blade

## 2019-05-18 NOTE — PROGRESS NOTES
Pt resting in bed throughout shift. She has had intermittent bouts of diarrhea. She continues to c/o back, bilateral hip, and RLQ pain. PRN Norco administered with good results. Pt moving self about in bed frequently. She has had very little to eat or drink this shift. Encouraging pt with PO intake. Will continue to monitor.

## 2019-05-18 NOTE — PROGRESS NOTES
Pt requesting pain medication for 7/10 back and abdominal pain. Repositioned for comfort and medicated with prn Tylenol. Will monitor.

## 2019-05-18 NOTE — PROGRESS NOTES
Hospitalist Progress Note    CC: <principal problem not specified>      Admit date: 5/16/2019  Days in hospital:  2    Subjective: Pt S/E. Pt reports feeling weak, and tired. no vomiting but has nausea. Still having diarrhea. Not drinking much. ROS:   Pertinent items are noted in HPI. Objective:    /62   Pulse 59   Temp 97.7 °F (36.5 °C) (Oral)   Resp 18   Ht 4' 9\" (1.448 m)   Wt 138 lb 7.2 oz (62.8 kg)   SpO2 95%   BMI 29.96 kg/m²     Gen: NAD, looks uncomfortable  HEENT: NC/AT, moist mucous membranes  Neck: supple, trachea midline  Heart: Normal s1/s2, RRR, no murmurs, gallops, or rubs. Lungs: clear bilaterally, no wheezing, no rales, no rhonchi, no use of accessory muscles  Abd: bowel sounds present, soft, tender difusely, nondistended  Extrem: No clubbing, cyanosis, no edema  Skin: no rashes or lesions  Psych: A & O x3, affect appropriate  Neuro: grossly intact, moves all four extremities spontaneously.  No focal deficits  Cap refill: +2 sec    Medications:  Scheduled Meds:   cefTRIAXone (ROCEPHIN) IV  1 g Intravenous Q24H    vancomycin  125 mg Oral Once    sotalol  40 mg Oral Daily    traZODone  50 mg Oral Nightly    venlafaxine  75 mg Oral Nightly    sodium chloride flush  10 mL Intravenous 2 times per day    metroNIDAZOLE  500 mg Intravenous Q8H    vancomycin  250 mg Oral 4x Daily    heparin (porcine)  5,000 Units Subcutaneous 3 times per day       PRN Meds:  acetaminophen, tiZANidine, sodium chloride flush, ondansetron    IV:   sodium chloride 100 mL/hr at 05/18/19 0608         Intake/Output Summary (Last 24 hours) at 5/18/2019 0807  Last data filed at 5/18/2019 7147  Gross per 24 hour   Intake 1642 ml   Output --   Net 1642 ml       Results:  CBC:   Recent Labs     05/16/19  1436 05/17/19  0433 05/18/19  0627   WBC 35.3* 30.2* 31.3*   HGB 13.1 12.8 13.7   HCT 40.1 39.3 41.7   MCV 96.0 95.3 97.4    229 236     BMP:   Recent Labs     05/16/19  1437 05/17/19  0433 05/18/19  0627    135* 139   K 5.4* 3.6 3.9   CL 96* 102 109   CO2 23 16* 15*   BUN 64* 55* 57*   CREATININE 2.5* 1.9* 1.4*     Mag: No results for input(s): MAG in the last 72 hours. Phos:   Lab Results   Component Value Date    PHOS 3.8 02/05/2014     No components found for: GLU    LIVER PROFILE:   Recent Labs     05/16/19  1436   AST 19   ALT 13   BILITOT 0.3   ALKPHOS 106     PT/INR: No results for input(s): PROTIME, INR in the last 72 hours. APTT: No results for input(s): APTT in the last 72 hours. UA:  Recent Labs     05/16/19  1547   COLORU DARK YELLOW*   PHUR 5.0   LABCAST 3-5 Hyaline*   WBCUA 3   RBCUA 2   CLARITYU CLOUDY*   SPECGRAV 1.023   LEUKOCYTESUR SMALL*   UROBILINOGEN 1.0   BILIRUBINUR MODERATE*   BLOODU Negative   GLUCOSEU Negative       Invalid input(s): ABG  Lab Results   Component Value Date    CALCIUM 7.7 (L) 05/18/2019    PHOS 3.8 02/05/2014       Assessment:    Active Problems:    C. difficile colitis  Resolved Problems:    * No resolved hospital problems. [facility] course: An 79 yo female admitted with fatigue and diarrhea. She was recently on Abx for a UTI. CT abdomen showed pancolitis.     Plan:  c diff pancolitis   - c diff +, stool studies    - on po vanc, flagyl iv day2   - clear liquid diet, will transition to full liquids tomorrow  - ivf     Sepsis, POA  - with criteria: leukocytosis, lactic acidosis, tachycardia, tachypnea; source is c diff, possible uti  - blood and urine cultures ngtd  - Abx as above  - IVF  - recheck lactate normalized    SHANTEL -improving with ivf  - crt baseline 1-1.3, on admission 2.5 -> 1.4  - prerenal, due to dehydration  - IVF  - Avoid nephrotoxins  - follow renal function tests; if no improvement will get renal US     UTI  - UCx sent  - rocephin day 2    Chronic Atrial Fibrillation   - currently rate controlled  - continue sotalol  - not on AC    CHF  - compensated  - bnp 6400  - cont home meds    CAD  - stable  - cont ASA,

## 2019-05-18 NOTE — PROGRESS NOTES
Pt resting in bed at beginning of shift. She c/o chronic back and hip pain and is moving about the bed consistently, stating she is in discomfort. She denies having any nausea, numbness, or tingling. She continues to have intermittent diarrhea. Pt remains A. Fib on telemetry. Fall precautions in place, belongings within reach. Will continue to monitor and assess.

## 2019-05-19 PROBLEM — K51.00 PANCOLITIS (HCC): Status: ACTIVE | Noted: 2019-05-19

## 2019-05-19 PROBLEM — N17.9 ACUTE RENAL FAILURE (ARF) (HCC): Status: ACTIVE | Noted: 2019-05-19

## 2019-05-19 PROBLEM — B99.9 INFECTION REQUIRING CONTACT ISOLATION PRECAUTIONS: Status: ACTIVE | Noted: 2019-05-19

## 2019-05-19 PROBLEM — A41.9 SEPSIS (HCC): Status: ACTIVE | Noted: 2019-05-19

## 2019-05-19 LAB
ANION GAP SERPL CALCULATED.3IONS-SCNC: 12 MMOL/L (ref 3–16)
BUN BLDV-MCNC: 52 MG/DL (ref 7–20)
CALCIUM SERPL-MCNC: 7.6 MG/DL (ref 8.3–10.6)
CHLORIDE BLD-SCNC: 107 MMOL/L (ref 99–110)
CO2: 15 MMOL/L (ref 21–32)
CREAT SERPL-MCNC: 1.4 MG/DL (ref 0.6–1.2)
GFR AFRICAN AMERICAN: 43
GFR NON-AFRICAN AMERICAN: 35
GLUCOSE BLD-MCNC: 151 MG/DL (ref 70–99)
HCT VFR BLD CALC: 43.1 % (ref 36–48)
HEMOGLOBIN: 13.8 G/DL (ref 12–16)
MAGNESIUM: 2.3 MG/DL (ref 1.8–2.4)
MCH RBC QN AUTO: 30.9 PG (ref 26–34)
MCHC RBC AUTO-ENTMCNC: 32 G/DL (ref 31–36)
MCV RBC AUTO: 96.6 FL (ref 80–100)
PDW BLD-RTO: 14.7 % (ref 12.4–15.4)
PLATELET # BLD: 251 K/UL (ref 135–450)
PMV BLD AUTO: 8.6 FL (ref 5–10.5)
POTASSIUM SERPL-SCNC: 3.6 MMOL/L (ref 3.5–5.1)
RBC # BLD: 4.46 M/UL (ref 4–5.2)
SODIUM BLD-SCNC: 134 MMOL/L (ref 136–145)
WBC # BLD: 34.5 K/UL (ref 4–11)

## 2019-05-19 PROCEDURE — 36415 COLL VENOUS BLD VENIPUNCTURE: CPT

## 2019-05-19 PROCEDURE — 2580000003 HC RX 258: Performed by: INTERNAL MEDICINE

## 2019-05-19 PROCEDURE — 6360000002 HC RX W HCPCS: Performed by: INTERNAL MEDICINE

## 2019-05-19 PROCEDURE — 94760 N-INVAS EAR/PLS OXIMETRY 1: CPT

## 2019-05-19 PROCEDURE — 99223 1ST HOSP IP/OBS HIGH 75: CPT | Performed by: INTERNAL MEDICINE

## 2019-05-19 PROCEDURE — 80048 BASIC METABOLIC PNL TOTAL CA: CPT

## 2019-05-19 PROCEDURE — 6370000000 HC RX 637 (ALT 250 FOR IP): Performed by: FAMILY MEDICINE

## 2019-05-19 PROCEDURE — 1200000000 HC SEMI PRIVATE

## 2019-05-19 PROCEDURE — 2500000003 HC RX 250 WO HCPCS: Performed by: INTERNAL MEDICINE

## 2019-05-19 PROCEDURE — 83735 ASSAY OF MAGNESIUM: CPT

## 2019-05-19 PROCEDURE — 85027 COMPLETE CBC AUTOMATED: CPT

## 2019-05-19 PROCEDURE — 6370000000 HC RX 637 (ALT 250 FOR IP)

## 2019-05-19 PROCEDURE — 6370000000 HC RX 637 (ALT 250 FOR IP): Performed by: INTERNAL MEDICINE

## 2019-05-19 RX ORDER — 0.9 % SODIUM CHLORIDE 0.9 %
500 INTRAVENOUS SOLUTION INTRAVENOUS ONCE
Status: COMPLETED | OUTPATIENT
Start: 2019-05-19 | End: 2019-05-19

## 2019-05-19 RX ADMIN — METRONIDAZOLE 500 MG: 500 INJECTION, SOLUTION INTRAVENOUS at 17:59

## 2019-05-19 RX ADMIN — METRONIDAZOLE 500 MG: 500 INJECTION, SOLUTION INTRAVENOUS at 00:48

## 2019-05-19 RX ADMIN — VENLAFAXINE HYDROCHLORIDE 75 MG: 75 CAPSULE, EXTENDED RELEASE ORAL at 22:02

## 2019-05-19 RX ADMIN — TRAZODONE HYDROCHLORIDE 50 MG: 50 TABLET ORAL at 22:02

## 2019-05-19 RX ADMIN — HEPARIN SODIUM 5000 UNITS: 5000 INJECTION INTRAVENOUS; SUBCUTANEOUS at 14:47

## 2019-05-19 RX ADMIN — HYDROCODONE BITARTRATE AND ACETAMINOPHEN 1 TABLET: 5; 325 TABLET ORAL at 17:59

## 2019-05-19 RX ADMIN — SOTALOL HYDROCHLORIDE 40 MG: 80 TABLET ORAL at 09:19

## 2019-05-19 RX ADMIN — Medication 10 ML: at 09:19

## 2019-05-19 RX ADMIN — VANCOMYCIN HYDROCHLORIDE 250 MG: 250 CAPSULE ORAL at 17:58

## 2019-05-19 RX ADMIN — VANCOMYCIN HYDROCHLORIDE 250 MG: 250 CAPSULE ORAL at 22:02

## 2019-05-19 RX ADMIN — VANCOMYCIN HYDROCHLORIDE 250 MG: 250 CAPSULE ORAL at 11:58

## 2019-05-19 RX ADMIN — METRONIDAZOLE 500 MG: 500 INJECTION, SOLUTION INTRAVENOUS at 09:18

## 2019-05-19 RX ADMIN — ACETAMINOPHEN 650 MG: 325 TABLET ORAL at 22:02

## 2019-05-19 RX ADMIN — VANCOMYCIN HYDROCHLORIDE 250 MG: 250 CAPSULE ORAL at 09:19

## 2019-05-19 RX ADMIN — DEXTROSE AND SODIUM CHLORIDE: 5; 450 INJECTION, SOLUTION INTRAVENOUS at 14:47

## 2019-05-19 RX ADMIN — HYDROCODONE BITARTRATE AND ACETAMINOPHEN 1 TABLET: 5; 325 TABLET ORAL at 05:17

## 2019-05-19 RX ADMIN — HEPARIN SODIUM 5000 UNITS: 5000 INJECTION INTRAVENOUS; SUBCUTANEOUS at 22:03

## 2019-05-19 RX ADMIN — HYDROCODONE BITARTRATE AND ACETAMINOPHEN 1 TABLET: 5; 325 TABLET ORAL at 11:58

## 2019-05-19 RX ADMIN — HEPARIN SODIUM 5000 UNITS: 5000 INJECTION INTRAVENOUS; SUBCUTANEOUS at 05:18

## 2019-05-19 RX ADMIN — SODIUM CHLORIDE 500 ML: 9 INJECTION, SOLUTION INTRAVENOUS at 09:18

## 2019-05-19 ASSESSMENT — PAIN - FUNCTIONAL ASSESSMENT
PAIN_FUNCTIONAL_ASSESSMENT: PREVENTS OR INTERFERES SOME ACTIVE ACTIVITIES AND ADLS

## 2019-05-19 ASSESSMENT — PAIN DESCRIPTION - PROGRESSION
CLINICAL_PROGRESSION: GRADUALLY WORSENING
CLINICAL_PROGRESSION: GRADUALLY WORSENING
CLINICAL_PROGRESSION: GRADUALLY IMPROVING
CLINICAL_PROGRESSION: GRADUALLY WORSENING
CLINICAL_PROGRESSION: GRADUALLY IMPROVING
CLINICAL_PROGRESSION: GRADUALLY WORSENING

## 2019-05-19 ASSESSMENT — PAIN DESCRIPTION - LOCATION
LOCATION: ABDOMEN;BACK;HIP
LOCATION: BREAST;HIP
LOCATION: ABDOMEN;BACK;HIP
LOCATION: ABDOMEN;BACK;HIP
LOCATION: BACK;HIP
LOCATION: ABDOMEN;BACK;HIP

## 2019-05-19 ASSESSMENT — PAIN DESCRIPTION - ORIENTATION
ORIENTATION: RIGHT;LEFT

## 2019-05-19 ASSESSMENT — PAIN SCALES - GENERAL
PAINLEVEL_OUTOF10: 6
PAINLEVEL_OUTOF10: 8
PAINLEVEL_OUTOF10: 0
PAINLEVEL_OUTOF10: 10
PAINLEVEL_OUTOF10: 0
PAINLEVEL_OUTOF10: 7
PAINLEVEL_OUTOF10: 10
PAINLEVEL_OUTOF10: 8
PAINLEVEL_OUTOF10: 0
PAINLEVEL_OUTOF10: 8

## 2019-05-19 ASSESSMENT — PAIN DESCRIPTION - DESCRIPTORS
DESCRIPTORS: ACHING
DESCRIPTORS: ACHING;CRAMPING
DESCRIPTORS: ACHING

## 2019-05-19 ASSESSMENT — PAIN DESCRIPTION - FREQUENCY
FREQUENCY: CONTINUOUS

## 2019-05-19 ASSESSMENT — PAIN DESCRIPTION - PAIN TYPE
TYPE: ACUTE PAIN;CHRONIC PAIN
TYPE: CHRONIC PAIN
TYPE: CHRONIC PAIN

## 2019-05-19 ASSESSMENT — ENCOUNTER SYMPTOMS
EYE DISCHARGE: 0
ABDOMINAL PAIN: 1
SHORTNESS OF BREATH: 0
COUGH: 0
RHINORRHEA: 0
STRIDOR: 0
NAUSEA: 0
TROUBLE SWALLOWING: 0
DIARRHEA: 1
EYE REDNESS: 0
FACIAL SWELLING: 0
CHOKING: 0
CHEST TIGHTNESS: 0
PHOTOPHOBIA: 0
BLOOD IN STOOL: 0
COLOR CHANGE: 0
APNEA: 0

## 2019-05-19 ASSESSMENT — PAIN DESCRIPTION - ONSET
ONSET: ON-GOING

## 2019-05-19 NOTE — PROGRESS NOTES
Hospitalist Progress Note    CC: C. difficile colitis    Hospital course:  81 yo female admitted with fatigue and diarrhea. She was recently on Abx for a UTI. CT abdomen showed pancolitis and C diff positive. Sepsis POA based on leukocytosis, lactic acidosis, acute kidney failure, tachycardia, tachypnea and CT findings of pancolitis and C diff positive. Pt also with sig PMHX for CHF chronic systolic, chronic atrial fibrillation rate controlled with sotalol, CAD. Admit date: 5/16/2019  Days in hospital:  3    24 Hour Events: still with a lot of diarrhea    Subjective: leukocytosis still high at 34K, tolerating medications, on vicodin every 6 hours and reasonable control of pain - urine culture negative = will stop IV ceftriaxone    ROS:   A comprehensive review of systems was negative except for: mild abd pain, still frequent diarrhea . Objective:    BP (!) 84/52   Pulse 64   Temp 97.3 °F (36.3 °C) (Oral)   Resp 18   Ht 4' 9\" (1.448 m)   Wt 136 lb 14.5 oz (62.1 kg)   SpO2 98%   BMI 29.63 kg/m²     Gen: ill appearing  HEENT: NC/AT, moist mucous membranes, no oropharyngeal erythema or exudate  Neck: supple, trachea midline, no anterior cervical or SC LAD  Heart:  Normal s1/s2, RRR, no murmurs, gallops, or rubs. no leg edema  Lungs:  diminished bilaterally, no wheeze, no rales, no rhonchi, no crackles, no use of accessory muscles  Abd: bowel sounds present, soft, nontender, nondistended, no masses  Extrem:  No clubbing, cyanosis,  no edema  Skin: no rashes or lesions  Psych: A & O x3  Neuro: grossly intact, moves all four extremities. Assessment:    Principal Problem:    C. difficile colitis  Active Problems:    DDD (degenerative disc disease), lumbar    Atrial fibrillation (HCC)    Hypertension    Sepsis ([address]    Acute renal failure (ARF) ([address]    Pancolitis ([address]  Resolved Problems:    * No resolved hospital problems.  *      Plan:  1.  C diff pancolitis - still with significant leukocytosis - will have infectious disease to consult and consider surgical consult - on oral vanco and IV flagyl - question efficacay of IV flagyl - but will defer to ID  2. Sepsis POA -based on leukocytosis, tachycardia, tachypnea, documented pancolitis, C diff positive, lactic acidosis  3. Acute renal failure - baseline creatinine is 0.8 - creatinine down to 1.4 - continue with IVF  4.  afib - continue with sotalol  5. Chronic pain - continue with vicodin, tizanidine, venlafaxine, trazodone  6.   Hypotension - will give fluid bolus this AM - monitor for fluid overload    Prognosis:  Fair    Code status:  FULL    DVT prophylaxis: [] Lovenox  [x] SQ Heparin  [] SCDs because of  [] warfarin/oral direct thrombin inhibitor [] Encourage ambulation  GI prophylaxis: [] PPI/N1twsilzy  [x] not indicated  Diet:  DIET CLEAR LIQUID;  Dietary Nutrition Supplements: Clear Liquid Oral Supplement    Disposition:  [] Home  [x] Home with home health [] Rehab [] Psych [] SNF  [] LTAC  [] Long term nursing home or group home [] Transfer to ICU  [] Transfer to PCU [] Other:     Medications:  Scheduled Meds:   sotalol  40 mg Oral Daily    traZODone  50 mg Oral Nightly    venlafaxine  75 mg Oral Nightly    sodium chloride flush  10 mL Intravenous 2 times per day    metroNIDAZOLE  500 mg Intravenous Q8H    vancomycin  250 mg Oral 4x Daily    heparin (porcine)  5,000 Units Subcutaneous 3 times per day       PRN Meds:  HYDROcodone 5 mg - acetaminophen, acetaminophen, tiZANidine, sodium chloride flush, ondansetron    IV:   dextrose 5 % and 0.45 % NaCl 100 mL/hr at 05/18/19 2300         Intake/Output Summary (Last 24 hours) at 5/19/2019 0838  Last data filed at 5/18/2019 1201  Gross per 24 hour   Intake 180 ml   Output --   Net 180 ml       Results:  CBC:   Recent Labs     05/17/19  0433 05/18/19  0627 05/19/19  0527   WBC 30.2* 31.3* 34.5*   HGB 12.8 13.7 13.8   HCT 39.3 41.7 43.1   MCV

## 2019-05-19 NOTE — CONSULTS
Infectious Diseases   Consult Note        Admission Date: 5/16/2019  Hospital Day: Hospital Day: 4  Attending: Luigi Arreola MD  Date of service: 5/19/19    Presenting complaint:   Chief Complaint   Patient presents with    Fatigue     x 3 days    Diarrhea       Reason for admission: C. difficile colitis [A04.72]    Chief complaint/ Reason for consult: Severe C. difficile colitis and worsening leukocytosis      Problem list:       Patient Active Problem List   Diagnosis Code    Primary localized osteoarthrosis, lower leg M17.10    Back pain M54.9    Low back pain M54.5    Facet arthropathy M47.819    DDD (degenerative disc disease), lumbar M51.36    Atrial fibrillation ([address] I48.91    Shortness of breath R06.02    Hypertension I10    Hyperlipidemia E78.5    Anemia D64.9    Frequent falls R29.6    Permanent atrial fibrillation ([address] I48.2    Acute encephalopathy G93.40    Confusion R41.0    Moderate malnutrition ([address] E44.0    C. difficile colitis A04.72    Infection requiring contact isolation precautions B99.9    Acute renal failure (ARF) ([address] N17.9    Pancolitis ([address] K51.00    Acute kidney injury ([address] N17.9    Dehydration E86.0    Coronary artery disease due to lipid rich plaque I25.10, I25.83    Overweight (BMI 25.0-29. 9) E66.3    Weight loss counseling, encounter for Z71.3         Microbiology:        I have reviewed allavailable micro lab data and cultures    · Blood culture (2/2) - collected on 5/16/2019: Negative  · Stool C. diff EIA  - collected on 5/16/2019: Positive        Assessment:     The patient is a 80 y.o. old female who  has a past medical history of Atrial fibrillation ([address] Carpal tunnel syndrome, bilateral, Cataract, CHF (congestive heart failure) ([address] Clostridium difficile diarrhea (05/16/2019), Hyperlipidemia, Hypertension, and MI (myocardial infarction) ([address]  with following problems:    · Severe C. diff colitis  · Worsening leukocytosis  · Acute kidney injury  · Essential hypertension  · Mixed hyperlipidemia  · Coronary artery disease  · Chronic atrial fibrillation  · Overweight due to excess calorie intake : Body mass index is 29.63 kg/m². Discussion:      The patient has severe C. diff colitis. She has small amount of leukocyte esterase in the urine or admission and was apparently given ciprofloxacin on admission and was For 2 Days. The patient has been on IV Flagyl and oral vancomycin since admission. However, C. diff colitis has worsened in the setting of broad-spectrum gram-negative antibiotics given for suspected UTI    I reviewed her urinalysis. It has only small amount of leukocyte esterase. This is not convincing for UTI. Urine cultures also remained negative. Blood cultures from admission are also negative    Plan:     Diagnostic Workup:    · Continue to follow fever curve, WBC count and blood cultures  · Follow up on liverand renal functions closely    Antimicrobials:    · Agree with stopping ceftriaxone  · Will avoid any broad-spectrum antibiotics  · Will continue IV Flagyl 500 mg every 8 hours  · Continue oral vancomycin 250 mg every 6 hours  · As the gram-negative coverage has been just stopped, we will recommend watching her on coverage  · Strict contact isolation for C. diff  · May consider adding Fidaxomicin current management on clinical progress  · If continues to worsen despite aggressive C. diff treatment, may need to consider modalities like fecal transplantation as a last resort  · Continue IV fluids as needed to maintain good hydration  · DVT prophylaxis  · Discussed the above plan with patient and RN       Drug Monitoring:    · Continue serial monitoring for antibiotic toxicity as follows: CMP  · Continue to watch for following: new or worsening fever, hypotension, hives, lip swelling and redness or purulence at vascular access sites.     I/v access Management:    · Continue to monitor i.v access sites for erythema, induration, discharge or tenderness. · As always, continue efforts to minimizetubes/lines/drains as clinically appropriate to reduce chances of line associated infections. Patient education and counseling:     · The patient was educated in detailabout the side-effects of various antibiotics and things to watch for like new rashes, lip swelling, severe reaction, worsening diarrhea, break through fever etc.  · Discussed patient'scondition and what to expect. All of the patient's questions were addressed in a satisfactory manner and patient verbalized understanding all instructions. Diabetes mellitus education and counseling:    Patient was educated in detail about the importance of keeping diabetes under control to improve the cure rate of infection and prevent future infections. Patient was advised to check blood glucose level regularly and to stay compliant with the diabetes medications. Patient was advised to the trim the toe nails carefully, wear shoes or slippers at all times and check both feet everyday before going to bed to look for any cuts, blisters, swelling or redness. Importance of washing the feet everyday with soap and water and keeping them dry, and seeking prompt medical attention in case of a non-healing wound or ulcer on the feet was also highlighted. Risk of Complications/Morbidity: High     · Illness(es)/ Infection present that pose threat to life/bodily function. · There is potential for severe exacerbation of infection/side effects of treatment. · Therapy requires intensive monitoring for antimicrobial agent toxicity. Thank you for involving me in the care of your patient. I will continue to follow. If you have any additional questions, please do not hesitate to contact me. Subjective:       HPI: Niko Sosa is a 80 y.o. female patient, who was seen at the request of Dr. Shyam Simon MD.    History was obtained from chart review and the patient.        The patient was admitted on 5/16/2019. I have been consulted to see the patient for above mentioned reason(s). The patient has multiple medical comorbidities, and presented to the ER for worsening diarrhea and not feeling well and loss of appetite. She was found to be sick count of 35,000 on admission. Chest x-ray was unremarkable. CT scan of abdomen and pelvis showed pancolitis. Urinalysis showed small amount of leukocyte esterase. The patient was admitted. Stool C. diff test was done which came back positive. She was started on oral vancomycin and IV Flagyl. Unfortunately she was also given IV ciprofloxacin in the ER and was started on IV ceftriaxone afterwards due to concerns for UTI    Her WBC count initially improved and 30,200 but has been going up afterward and is obese 34,500. I have been asked to see the patient for this complicated infection. Past Medical History: All past medical history reviewed today. Past Medical History:   Diagnosis Date    Atrial fibrillation (HCC)     Carpal tunnel syndrome, bilateral     Cataract     CHF (congestive heart failure) (HCC)     Clostridium difficile diarrhea 05/16/2019    Hyperlipidemia     Hypertension     [facility])          Past Surgical History: All pastsurgical history was reviewed today. Past Surgical History:   Procedure Laterality Date    CARPAL TUNNEL RELEASE      CATARACT REMOVAL      EYE SURGERY      OTHER SURGICAL HISTORY  07/19/2017    Watchman procedure    SPINE SURGERY      TOTAL KNEE ARTHROPLASTY Bilateral        Social History:  All social andepidemiologic history was reviewed and updated by me today as needed. · Tobacco use:   reports that she has never smoked. She has never used smokeless tobacco.  · Alcohol use:   reports that she does not drink alcohol. · Currently lives in: [place] [id number]  ·  reports that she does not use drugs. Immunization History:  All immunization history was reviewed by me today. Immunization History   Administered Date(s) Administered    Influenza, Quadv, 6 mo and older, IM, PF (Flulaval, Fluarix) 03/02/2019    Tdap (Boostrix, Adacel) 12/22/2017       Family History: All family history was reviewed today. Problem Relation Age of Onset    Other Mother     Other Father          Medications: All current and past medications were reviewed. Medications Prior to Admission: clopidogrel (PLAVIX) 75 MG tablet, TAKE 1 TABLET BY MOUTH EVERY DAY  aspirin 81 MG chewable tablet, Take 1 tablet by mouth daily  venlafaxine (EFFEXOR XR) 75 MG extended release capsule, Take 75 mg by mouth nightly   traZODone (DESYREL) 50 MG tablet, Take 1 tablet by mouth nightly  calcium carbonate 600 MG TABS tablet, Take 1 tablet by mouth every evening   pravastatin (PRAVACHOL) 40 MG tablet, Take 40 mg by mouth every evening. omeprazole (PRILOSEC) 40 MG capsule, Take 40 mg by mouth daily as needed (acid reflux) Take 40 mg by mouth daily.     lisinopril (PRINIVIL;ZESTRIL) 40 MG tablet, Take 40 mg by mouth every evening   polyethylene glycol (GLYCOLAX) packet, Take 17 g by mouth daily as needed for Constipation  tiZANidine (ZANAFLEX) 2 MG tablet, Take 2 mg by mouth every 8 hours as needed (muscle spasms)   sotalol (BETAPACE) 80 MG tablet, Take 40 mg by mouth daily      sotalol  40 mg Oral Daily    traZODone  50 mg Oral Nightly    venlafaxine  75 mg Oral Nightly    sodium chloride flush  10 mL Intravenous 2 times per day    metroNIDAZOLE  500 mg Intravenous Q8H    vancomycin  250 mg Oral 4x Daily    heparin (porcine)  5,000 Units Subcutaneous 3 times per day       Current antibiotics: All antibiotics and their doses were reviewed by me    Recent Abx Admin                   vancomycin (VANCOCIN) capsule 250 mg (mg) 250 mg Given 05/19/19 0919     250 mg Given 05/18/19 2252     250 mg Given  1714     250 mg Given  1239    metronidazole (FLAGYL) 500 mg in NaCl 100 mL IVPB premix (mg) 500 mg New Bag 05/19/19 0918     500 mg New Bag  0048     500 mg New Bag 05/18/19 1714    cefTRIAXone (ROCEPHIN) 1 g IVPB in 50 mL D5W minibag (g) 1 g New Bag 05/18/19 1522                   REVIEW OF SYSTEMS:       Review of Systems   Constitutional: Negative for chills, diaphoresis and unexpected weight change. HENT: Negative for congestion, ear discharge, ear pain, facial swelling, hearing loss, rhinorrhea and trouble swallowing. Eyes: Negative for photophobia, discharge, redness and visual disturbance. Respiratory: Negative for apnea, cough, choking, chest tightness, shortness of breath and stridor. Cardiovascular: Negative for chest pain and palpitations. Gastrointestinal: Positive for abdominal pain (Right lower quadrant) and diarrhea. Negative for blood in stool and nausea. Endocrine: Negative for polydipsia, polyphagia and polyuria. Genitourinary: Negative for difficulty urinating, dysuria, frequency, hematuria, menstrual problem and vaginal discharge. Musculoskeletal: Negative for arthralgias, joint swelling, myalgias and neck stiffness. Skin: Negative for color change and rash. Allergic/Immunologic: Negative for immunocompromised state. Neurological: Negative for dizziness, seizures, speech difficulty, light-headedness and headaches. Hematological: Negative for adenopathy. Psychiatric/Behavioral: Negative for agitation, hallucinations and suicidal ideas. Objective:       PHYSICAL EXAM:      Vitals:   Vitals:    05/19/19 0452 05/19/19 0653 05/19/19 0745 05/19/19 0755   BP: (!) 100/59   (!) 84/52   Pulse: 67   64   Resp: 18   18   Temp: 97.3 °F (36.3 °C)   97.3 °F (36.3 °C)   TempSrc: Oral   Oral   SpO2: 95%  98% 98%   Weight:  136 lb 14.5 oz (62.1 kg)     Height:           Physical Exam   Constitutional: She is oriented to person, place, and time. She appears well-developed. No distress. HENT:   Head: Normocephalic.    Right Ear: External ear normal.   Left Ear: External ear normal.   Nose: Nose normal.   Mouth/Throat: Oropharynx is clear and moist.   Eyes: Pupils are equal, round, and reactive to light. Conjunctivae are normal. Right eye exhibits no discharge. Left eye exhibits no discharge. No scleral icterus. Neck: Normal range of motion. Neck supple. Cardiovascular: Normal rate and regular rhythm. Exam reveals no friction rub. No murmur heard. Pulmonary/Chest: Effort normal. No stridor. She has no wheezes. She has no rales. She exhibits no tenderness. Abdominal: Soft. She exhibits no mass. There is tenderness (right lower quadrant area). There is no rebound and no guarding. Musculoskeletal: She exhibits no edema or tenderness. Lymphadenopathy:     She has no cervical adenopathy. Neurological: She is alert and oriented to person, place, and time. She exhibits normal muscle tone. Skin: Skin is warm and dry. No rash noted. She is not diaphoretic. No erythema. Psychiatric: She has a normal mood and affect. Judgment normal.   Nursing note and vitals reviewed. Lines: All vascular access sites are healthy with no local erythema, discharge or tenderness. Intake and output:     I/O last 3 completed shifts: In: 180 [P.O.:180]  Out: -     Lab Data:   All available labs were reviewed by me today.      CBC:   Recent Labs     05/16/19  1436 05/17/19 0433 05/18/19 0627 05/19/19 0527   WBC 35.3* 30.2* 31.3* 34.5*   RBC 4.17 4.12 4.28 4.46   HGB 13.1 12.8 13.7 13.8   HCT 40.1 39.3 41.7 43.1    229 236 251   MCV 96.0 95.3 97.4 96.6   MCH 31.4 31.0 31.9 30.9   MCHC 32.6 32.5 32.7 32.0   RDW 13.9 14.0 14.6 14.7   BANDSPCT 6 9*  --   --         BMP:  Recent Labs     05/17/19 0433 05/18/19 0627 05/19/19 0527   * 139 134*   K 3.6 3.9 3.6    109 107   CO2 16* 15* 15*   BUN 55* 57* 52*   CREATININE 1.9* 1.4* 1.4*   CALCIUM 7.7* 7.7* 7.6*   GLUCOSE 104* 75 151*        Hepatic FunctionPanel:   Lab Results   Component Value Date    ALKPHOS 106 05/16/2019    ALT 13 05/16/2019    AST 19 05/16/2019    PROT 6.4 05/16/2019    BILITOT 0.3 05/16/2019    LABALBU 2.9 05/16/2019       CPK: No results found for: CKTOTAL  ESR: No results found for: SEDRATE  CRP: No results found for: CRP      Imaging: All pertinent images and reports for the current visit were reviewed by meduring this visit. CT ABDOMEN PELVIS WO CONTRAST   Final Result   Pancolitis, infectious or inflammatory. RECOMMENDATIONS:   1         XR CHEST PORTABLE   Final Result   No acute findings             Outside records:    Labs, Microbiology, Radiology and pertinent results from Care everywhere, if available, were reviewed as a part ofthe consultation. Known drug Allergies: All allergies were reviewed and updated    Allergies   Allergen Reactions    Elena Advanced Aspirin Ex St [Aspirin] Other (See Comments)     Elena back and body  Off balance and deaf         Please note that this chart was generated using Dragon dictation software. Although every effort was made to ensure the accuracy of this automated transcription, some errors in transcription may have occurred inadvertently. If you may need any clarification, please do not hesitate to contact me through EPIC or at the phone number provided below with my electronic signature.       Aranza Roberto MD, MPH  5/19/2019, 11:41 AM  [facility] Infectious Disease   Office: [phone]  Fax: [phone]  Tuesday AM clinic:   [address], [place] [address]  Thursday AM clinic: [address] [facility]

## 2019-05-19 NOTE — PROGRESS NOTES
Pt resting in bed at beginning of shift. She continues to c/o \"feeling terrible\" and has abdominal pain, in addition to chronic back and bilateral hip pain. She continues to have frequent incontinent loose, mucus BM's. She denies any current nausea. Pt also continues to have very poor appetite. Encourage pt to increase PO intake. BP low this a.m., fluid bolus infusing, as ordered. Fall precautions in place, belongings within reach. Will continue to monitor and assess.

## 2019-05-20 ENCOUNTER — APPOINTMENT (OUTPATIENT)
Dept: GENERAL RADIOLOGY | Age: 84
DRG: 872 | End: 2019-05-20
Payer: MEDICARE

## 2019-05-20 LAB
ANION GAP SERPL CALCULATED.3IONS-SCNC: 13 MMOL/L (ref 3–16)
BANDED NEUTROPHILS RELATIVE PERCENT: 18 % (ref 0–7)
BASOPHILS ABSOLUTE: 0 K/UL (ref 0–0.2)
BASOPHILS RELATIVE PERCENT: 0 %
BUN BLDV-MCNC: 49 MG/DL (ref 7–20)
CALCIUM SERPL-MCNC: 7.6 MG/DL (ref 8.3–10.6)
CHLORIDE BLD-SCNC: 104 MMOL/L (ref 99–110)
CO2: 17 MMOL/L (ref 21–32)
CREAT SERPL-MCNC: 1.5 MG/DL (ref 0.6–1.2)
CRENATED RBC'S: ABNORMAL
EOSINOPHILS ABSOLUTE: 0 K/UL (ref 0–0.6)
EOSINOPHILS RELATIVE PERCENT: 0 %
GFR AFRICAN AMERICAN: 40
GFR NON-AFRICAN AMERICAN: 33
GLUCOSE BLD-MCNC: 130 MG/DL (ref 70–99)
HCT VFR BLD CALC: 41.8 % (ref 36–48)
HEMOGLOBIN: 13.7 G/DL (ref 12–16)
LYMPHOCYTES ABSOLUTE: 2.1 K/UL (ref 1–5.1)
LYMPHOCYTES RELATIVE PERCENT: 6 %
MAGNESIUM: 2.3 MG/DL (ref 1.8–2.4)
MCH RBC QN AUTO: 31.5 PG (ref 26–34)
MCHC RBC AUTO-ENTMCNC: 32.7 G/DL (ref 31–36)
MCV RBC AUTO: 96.4 FL (ref 80–100)
METAMYELOCYTES RELATIVE PERCENT: 3 %
MONOCYTES ABSOLUTE: 2.1 K/UL (ref 0–1.3)
MONOCYTES RELATIVE PERCENT: 6 %
MYELOCYTE PERCENT: 3 %
NEUTROPHILS ABSOLUTE: 30.2 K/UL (ref 1.7–7.7)
NEUTROPHILS RELATIVE PERCENT: 64 %
OVALOCYTES: ABNORMAL
PDW BLD-RTO: 14.7 % (ref 12.4–15.4)
PLATELET # BLD: 284 K/UL (ref 135–450)
PLATELET SLIDE REVIEW: ADEQUATE
PMV BLD AUTO: 9 FL (ref 5–10.5)
POTASSIUM SERPL-SCNC: 5.2 MMOL/L (ref 3.5–5.1)
PRO-BNP: 4249 PG/ML (ref 0–449)
RBC # BLD: 4.34 M/UL (ref 4–5.2)
SLIDE REVIEW: ABNORMAL
SODIUM BLD-SCNC: 134 MMOL/L (ref 136–145)
TOXIC GRANULATION: PRESENT
WBC # BLD: 34.3 K/UL (ref 4–11)

## 2019-05-20 PROCEDURE — 36415 COLL VENOUS BLD VENIPUNCTURE: CPT

## 2019-05-20 PROCEDURE — 6370000000 HC RX 637 (ALT 250 FOR IP): Performed by: INTERNAL MEDICINE

## 2019-05-20 PROCEDURE — 2580000003 HC RX 258: Performed by: INTERNAL MEDICINE

## 2019-05-20 PROCEDURE — 6370000000 HC RX 637 (ALT 250 FOR IP)

## 2019-05-20 PROCEDURE — 83735 ASSAY OF MAGNESIUM: CPT

## 2019-05-20 PROCEDURE — 94760 N-INVAS EAR/PLS OXIMETRY 1: CPT

## 2019-05-20 PROCEDURE — 2500000003 HC RX 250 WO HCPCS: Performed by: INTERNAL MEDICINE

## 2019-05-20 PROCEDURE — 6360000002 HC RX W HCPCS: Performed by: NURSE PRACTITIONER

## 2019-05-20 PROCEDURE — 6360000002 HC RX W HCPCS: Performed by: INTERNAL MEDICINE

## 2019-05-20 PROCEDURE — 6370000000 HC RX 637 (ALT 250 FOR IP): Performed by: FAMILY MEDICINE

## 2019-05-20 PROCEDURE — 97530 THERAPEUTIC ACTIVITIES: CPT

## 2019-05-20 PROCEDURE — 99233 SBSQ HOSP IP/OBS HIGH 50: CPT | Performed by: INTERNAL MEDICINE

## 2019-05-20 PROCEDURE — 97535 SELF CARE MNGMENT TRAINING: CPT

## 2019-05-20 PROCEDURE — 2580000003 HC RX 258: Performed by: NURSE PRACTITIONER

## 2019-05-20 PROCEDURE — 80048 BASIC METABOLIC PNL TOTAL CA: CPT

## 2019-05-20 PROCEDURE — 71046 X-RAY EXAM CHEST 2 VIEWS: CPT

## 2019-05-20 PROCEDURE — 1200000000 HC SEMI PRIVATE

## 2019-05-20 PROCEDURE — 83880 ASSAY OF NATRIURETIC PEPTIDE: CPT

## 2019-05-20 PROCEDURE — 85025 COMPLETE CBC W/AUTO DIFF WBC: CPT

## 2019-05-20 RX ORDER — FUROSEMIDE 10 MG/ML
20 INJECTION INTRAMUSCULAR; INTRAVENOUS ONCE
Status: COMPLETED | OUTPATIENT
Start: 2019-05-20 | End: 2019-05-20

## 2019-05-20 RX ORDER — SODIUM CHLORIDE 450 MG/100ML
INJECTION, SOLUTION INTRAVENOUS CONTINUOUS
Status: DISCONTINUED | OUTPATIENT
Start: 2019-05-20 | End: 2019-05-20

## 2019-05-20 RX ADMIN — FIDAXOMICIN 200 MG: 200 TABLET, FILM COATED ORAL at 12:39

## 2019-05-20 RX ADMIN — DEXTROSE AND SODIUM CHLORIDE: 5; 450 INJECTION, SOLUTION INTRAVENOUS at 00:49

## 2019-05-20 RX ADMIN — TIZANIDINE 2 MG: 4 TABLET ORAL at 16:41

## 2019-05-20 RX ADMIN — HEPARIN SODIUM 5000 UNITS: 5000 INJECTION INTRAVENOUS; SUBCUTANEOUS at 21:43

## 2019-05-20 RX ADMIN — FIDAXOMICIN 200 MG: 200 TABLET, FILM COATED ORAL at 21:42

## 2019-05-20 RX ADMIN — HEPARIN SODIUM 5000 UNITS: 5000 INJECTION INTRAVENOUS; SUBCUTANEOUS at 05:39

## 2019-05-20 RX ADMIN — HEPARIN SODIUM 5000 UNITS: 5000 INJECTION INTRAVENOUS; SUBCUTANEOUS at 12:38

## 2019-05-20 RX ADMIN — VENLAFAXINE HYDROCHLORIDE 75 MG: 75 CAPSULE, EXTENDED RELEASE ORAL at 21:42

## 2019-05-20 RX ADMIN — SODIUM CHLORIDE: 4.5 INJECTION, SOLUTION INTRAVENOUS at 09:32

## 2019-05-20 RX ADMIN — VANCOMYCIN HYDROCHLORIDE 250 MG: 250 CAPSULE ORAL at 12:39

## 2019-05-20 RX ADMIN — HYDROCODONE BITARTRATE AND ACETAMINOPHEN 1 TABLET: 5; 325 TABLET ORAL at 16:41

## 2019-05-20 RX ADMIN — METRONIDAZOLE 500 MG: 500 INJECTION, SOLUTION INTRAVENOUS at 09:32

## 2019-05-20 RX ADMIN — VANCOMYCIN HYDROCHLORIDE 250 MG: 250 CAPSULE ORAL at 16:41

## 2019-05-20 RX ADMIN — HYDROCODONE BITARTRATE AND ACETAMINOPHEN 1 TABLET: 5; 325 TABLET ORAL at 03:10

## 2019-05-20 RX ADMIN — VANCOMYCIN HYDROCHLORIDE 250 MG: 250 CAPSULE ORAL at 21:42

## 2019-05-20 RX ADMIN — METRONIDAZOLE 500 MG: 500 INJECTION, SOLUTION INTRAVENOUS at 16:41

## 2019-05-20 RX ADMIN — METRONIDAZOLE 500 MG: 500 INJECTION, SOLUTION INTRAVENOUS at 00:48

## 2019-05-20 RX ADMIN — FUROSEMIDE 20 MG: 10 INJECTION, SOLUTION INTRAMUSCULAR; INTRAVENOUS at 12:38

## 2019-05-20 RX ADMIN — SOTALOL HYDROCHLORIDE 40 MG: 80 TABLET ORAL at 12:39

## 2019-05-20 RX ADMIN — Medication 10 ML: at 21:43

## 2019-05-20 RX ADMIN — TRAZODONE HYDROCHLORIDE 50 MG: 50 TABLET ORAL at 21:42

## 2019-05-20 RX ADMIN — VANCOMYCIN HYDROCHLORIDE 250 MG: 250 CAPSULE ORAL at 09:33

## 2019-05-20 ASSESSMENT — PAIN SCALES - GENERAL
PAINLEVEL_OUTOF10: 0
PAINLEVEL_OUTOF10: 0
PAINLEVEL_OUTOF10: 10
PAINLEVEL_OUTOF10: 7
PAINLEVEL_OUTOF10: 10
PAINLEVEL_OUTOF10: 0

## 2019-05-20 ASSESSMENT — PAIN DESCRIPTION - PROGRESSION
CLINICAL_PROGRESSION: NOT CHANGED
CLINICAL_PROGRESSION: NOT CHANGED
CLINICAL_PROGRESSION: GRADUALLY WORSENING

## 2019-05-20 ASSESSMENT — PAIN DESCRIPTION - PAIN TYPE
TYPE: CHRONIC PAIN

## 2019-05-20 ASSESSMENT — PAIN - FUNCTIONAL ASSESSMENT
PAIN_FUNCTIONAL_ASSESSMENT: ACTIVITIES ARE NOT PREVENTED
PAIN_FUNCTIONAL_ASSESSMENT: ACTIVITIES ARE NOT PREVENTED

## 2019-05-20 ASSESSMENT — PAIN DESCRIPTION - FREQUENCY
FREQUENCY: CONTINUOUS
FREQUENCY: CONTINUOUS

## 2019-05-20 ASSESSMENT — PAIN DESCRIPTION - ORIENTATION
ORIENTATION: RIGHT;LEFT;MID
ORIENTATION: MID;RIGHT;LEFT

## 2019-05-20 ASSESSMENT — PAIN DESCRIPTION - LOCATION
LOCATION: BACK;LEG
LOCATION: BACK;HIP;ABDOMEN
LOCATION: BACK;LEG

## 2019-05-20 ASSESSMENT — PAIN DESCRIPTION - ONSET
ONSET: ON-GOING
ONSET: ON-GOING

## 2019-05-20 ASSESSMENT — PAIN DESCRIPTION - DESCRIPTORS
DESCRIPTORS: ACHING
DESCRIPTORS: ACHING

## 2019-05-20 NOTE — PROGRESS NOTES
[facility] Medicine Progress Note      Admit Date: 5/16/2019         Overnight Events: No    CC: F/U for cdiff pancolitis    HPI: The patient is an 79 yo female, with a pmh of chronic systolic CHF, chronic atrial fibrillation, and CAD, admitted with fatigue and diarrhea. The patient noted several days of diarrhea prior to admission, with associated abd pain. She had recently been on Abx for a UTI. In the ER, a CT a/p showed pancolitis. The patient was found to be C diff positive. She was admitted for sepsis 2/2 c.diff pancolitis. IV flagyl and PO vancomycin were initiated. Infectious disease was consulted. Interval History/Subjective: The patient states that today she is SOB 2/2 abd distention. She notes no further diarrhea.     Review of Systems:     _____________________________________________________________________  General ROS:  [x] N/A [] Other:  _____________________________________________________________________  HEENT ROS:  [x] N/A [] Other:  _____________________________________________________________________  Respiratory ROS:  [] N/A [x] Other: SOB  _____________________________________________________________________  Cardiovascular ROS:  [x] N/A [] Other:  _____________________________________________________________________  Gastrointestinal ROS:  [] N/A [x] Other: No further diarrea  _____________________________________________________________________  Genitourinary:  [x] N/A [] Other:  _____________________________________________________________________  Musculoskeletal ROS:  [x] N/A [] Other:  _____________________________________________________________________  Endocrine ROS:  [x] N/A [] Other:  _____________________________________________________________________  Neurological ROS:  [x] N/A [] Other:  _____________________________________________________________________  Psych ROS:  [x] N/A [] Other:  _____________________________________________________________________  Dermatological ROS:  [x] N/A [] Other:  _____________________________________________________________________    Comprehensive ROS negative except as mentioned above. Past Medical History:        Diagnosis Date    Atrial fibrillation (HCC)     Carpal tunnel syndrome, bilateral     Cataract     CHF (congestive heart failure) (HCC)     Clostridium difficile diarrhea 05/16/2019    Hyperlipidemia     Hypertension     MI (myocardial infarction) [address] [address]        Past Surgical History:        Procedure Laterality Date    CARPAL TUNNEL RELEASE      CATARACT REMOVAL      EYE SURGERY      OTHER SURGICAL HISTORY  07/19/2017    Watchman procedure    SPINE SURGERY      TOTAL KNEE ARTHROPLASTY Bilateral        Allergies:  Elena advanced aspirin ex st [aspirin]    Past medical and surgical history reviewed. Any changes have been noted. Scheduled and prn Medications:    Scheduled Meds:   sotalol  40 mg Oral Daily    traZODone  50 mg Oral Nightly    venlafaxine  75 mg Oral Nightly    sodium chloride flush  10 mL Intravenous 2 times per day    metroNIDAZOLE  500 mg Intravenous Q8H    vancomycin  250 mg Oral 4x Daily    heparin (porcine)  5,000 Units Subcutaneous 3 times per day     Continuous Infusions:   dextrose 5 % and 0.45 % NaCl 100 mL/hr at 05/20/19 0049     PRN Meds:. HYDROcodone 5 mg - acetaminophen, acetaminophen, tiZANidine, sodium chloride flush, ondansetron    PHYSICAL EXAM:  /66   Pulse 64   Temp 97.3 °F (36.3 °C) (Oral)   Resp 18   Ht 4' 9\" (1.448 m)   Wt 136 lb 14.5 oz (62.1 kg)   SpO2 99%   BMI 29.63 kg/m²       Intake/Output Summary (Last 24 hours) at 5/20/2019 0854  Last data filed at 5/19/2019 2324  Gross per 24 hour   Intake 1366.57 ml   Output --   Net 1366.57 ml       General: Alert and oriented. Sitting up at bedside in NAD. Pleasant and cooperative. HEENT: Normocephalic. Atraumatic.  Pupils equal and reactive. EOM intact. Oral mucosa pink/moist/intact. Neck: Supple. Symmetrical. Trachea midline. Lungs: Clear to auscultation bilaterally - breaths shallow. Respirations even and unlabored. Chest: Exam unremarkable. Cardiac: S1/S2 noted. Regular Rhythm and rate. Abdomen/GI: Soft. Non-tender. Non-distended. BS+. Extremities: PP+. Atraumatic. No redness/cyanosis/edema noted. Brisk cap refill. Skin: Dry and intact. No lesions noted. Neuro: Grossly intact. No focal deficits noted. LABS:    Lab Results   Component Value Date    WBC 34.3 (H) 05/20/2019    HGB 13.7 05/20/2019    HCT 41.8 05/20/2019    MCV 96.4 05/20/2019     05/20/2019    LYMPHOPCT 6.0 05/20/2019    RBC 4.34 05/20/2019    MCH 31.5 05/20/2019    MCHC 32.7 05/20/2019    RDW 14.7 05/20/2019       Lab Results   Component Value Date    CREATININE 1.5 (H) 05/20/2019    BUN 49 (H) 05/20/2019     (L) 05/20/2019    K 5.2 (H) 05/20/2019     05/20/2019    CO2 17 (L) 05/20/2019       Lab Results   Component Value Date    MG 2.30 05/20/2019       Lab Results   Component Value Date    ALT 13 05/16/2019    AST 19 05/16/2019    ALKPHOS 106 05/16/2019    BILITOT 0.3 05/16/2019        No flowsheet data found. Imaging:  CT ABDOMEN PELVIS WO CONTRAST   Final Result   Pancolitis, infectious or inflammatory. RECOMMENDATIONS:   1         XR CHEST PORTABLE   Final Result   No acute findings             Assessment & Plan:      Sepsis, POA  2/2 c.diff pancolitis  On IV flagyl, PO Vancomycin, dificid  Received IVF  Treat underlying cause    C.diff pancolitis  ID following. Appreciate assistance  IV flagyl. PO Vancomycin. Dificid  Adjust abx per ID rec  Leukocytosis persists  Consider surgical sonsult    Acute renal failure   2/2 diarrhea  Baseline creatinine 0.8  Avoid nephrotoxic medications as able. Renally dose medications. Monitor for electrolyte abnormalities.   Currently holding IVFs 2/2 SOB    SOB, suspect 2/2 fluid overload  Check Cxr and BNP  + 3.24 L. IV lasix x 1. Supplemental oxygen (titrate to SpO2 88-92%)  Treat underlying cause  Follow labs and vitals    Afib  Sotalol    Chronic pain  Continue home regimen    Hypotension  2/2 diarrhea/dehydration  Has received IVFs  Continue to monitor closely. Body mass index is 29.63 kg/m². The patient and / or the family were informed of the results of any tests, a time was given to answer questions, a plan was proposed and they agreed with plan.     DVT prophylaxis: [] Lovenox  [x] SQ Heparin  [] SCDs because of  [] warfarin/oral direct thrombin inhibitor [] Encourage ambulation    GI prophylaxis: [] PPI/H1mxnhahb  [x] not indicated    Probiotic if on abx: [] Yes [] No [x] Not Indicated    Diet: DIET CLEAR LIQUID;  Dietary Nutrition Supplements: Clear Liquid Oral Supplement    Consults:  IP CONSULT TO DIETITIAN  IP CONSULT TO INFECTIOUS DISEASES    Disposition:  [] Home  [] Home with home health [] Rehab [] Psych [] SNF  [] LTAC  [] Long term nursing home or group home [] Transfer to ICU  [] Transfer to PCU [x] Other: TBD    Code Status: Full Code    ELOS: TBD      CHERELLE Cooper - NP  05/20/19

## 2019-05-20 NOTE — PROGRESS NOTES
Infectious Disease Follow up Notes  Admit Date: 5/16/2019  Hospital Day: 5    Antibiotics : ORAL Vancomycin  IV Flagyl  Fidaxomicin     CHIEF COMPLAINT:     C diff diarrhea  Pancolitis  WBC elevationn    Subjective interval History :  80 y.o. With A fib and recurrent UTI was recently hospitalized in April was treated with a course of IV abx followed by oral abx  Now admitted from home with acute onset Diarrhea and abd pain stool  C diff+ve and WBC elevation. Has on going pain and distension feels bad no appetite some sob+ making urine ok and daughter at bed side       Past Medical History:    Past Medical History:   Diagnosis Date    Atrial fibrillation (HCC)     Carpal tunnel syndrome, bilateral     Cataract     [facility]     Clostridium difficile diarrhea 05/16/2019    Hyperlipidemia     Hypertension     MI (myocardial infarction) ([facility] [address]        Past Surgical History:    Past Surgical History:   Procedure Laterality Date    CARPAL TUNNEL RELEASE      CATARACT REMOVAL      EYE SURGERY      OTHER SURGICAL HISTORY  07/19/2017    Watchman procedure    SPINE SURGERY      TOTAL KNEE ARTHROPLASTY Bilateral        Current Medications:    Outpatient Medications Marked as Taking for the 5/16/19 encounter [facility] Encounter)   Medication Sig Dispense Refill    clopidogrel (PLAVIX) 75 MG tablet TAKE 1 TABLET BY MOUTH EVERY DAY 90 tablet 2    aspirin 81 MG chewable tablet Take 1 tablet by mouth daily 30 tablet 3    venlafaxine (EFFEXOR XR) 75 MG extended release capsule Take 75 mg by mouth nightly       traZODone (DESYREL) 50 MG tablet Take 1 tablet by mouth nightly 30 tablet 0    calcium carbonate 600 MG TABS tablet Take 1 tablet by mouth every evening       pravastatin (PRAVACHOL) 40 MG tablet Take 40 mg by mouth every evening.       omeprazole (PRILOSEC) 40 MG capsule Take 40 mg by mouth daily as needed (acid reflux) Take 40 mg by mouth daily.  lisinopril (PRINIVIL;ZESTRIL) 40 MG tablet Take 40 mg by mouth every evening          Allergies:  Elena advanced aspirin ex st [aspirin]    Immunizations :   Immunization History   Administered Date(s) Administered    Influenza, Quadv, 6 mo and older, IM, PF (Flulaval, Fluarix) 03/02/2019    Tdap (Boostrix, Adacel) 12/22/2017       Social History:   Social History     Tobacco Use    Smoking status: Never Smoker    Smokeless tobacco: Never Used   Substance Use Topics    Alcohol use: No    Drug use: No     Social History     Tobacco Use   Smoking Status Never Smoker   Smokeless Tobacco Never Used      Family History   Problem Relation Age of Onset    Other Mother     Other Father         REVIEW OF SYSTEMS:    No fever / chills / sweats. No weight loss. No visual change, eye pain, eye discharge. No oral lesion, sore throat, dysphagia. Denies cough / sputum/Sob   Denies chest pain, palpitations/ dizziness  Denies nausea/ vomiting/abdominal pain/diarrhea. Denies dysuria or change in urinary function. Denies joint swelling or pain. No myalgia, arthralgia. No rashes, skin lesions   Denies focal weakness, sensory change or other neurologic symptoms  No lymph node swelling or tenderness.     Diarrhea+ nausea poor appetite and no chills sob+    PHYSICAL EXAM:      Vitals:    BP (!) 107/55   Pulse 66   Temp 97.7 °F (36.5 °C) (Oral)   Resp 14   Ht 4' 9\" (1.448 m)   Wt 136 lb 14.5 oz (62.1 kg)   SpO2 95%   BMI 29.63 kg/m²     General Appearance: alert,in no acute distress, + pallor, no icterus   Skin: warm and dry, no rash or erythema  Head: normocephalic and atraumatic  Eyes: pupils equal, round, and reactive to light, conjunctivae normal  ENT: tympanic membrane, external ear and ear canal normal bilaterally, nose without deformity, nasal mucosa and turbinates normal without polyps  Neck: supple and non-tender without mass, no thyromegaly  no 6433    Wbc  34.3     MICRO: cultures reviewed and updated by me              Urine Culture [[id number]] Collected: 05/16/19 1547   Order Status: Completed Specimen: Urine, clean catch Updated: 05/18/19 0918    Urine Culture, Routine No growth at 18-36 hours   Narrative:     ORDER#: [id number]                          ORDERED BY: Rachel Jaimes  SOURCE: Urine Clean Catch                  COLLECTED:  05/16/19 15:47  ANTIBIOTICS AT [facility]. :                      RECEIVED :  05/16/19 16:43  Performed at:  [facility]  [address] [employer] [address]   Phone [phone]   Culture Blood #2 [[id number]] Collected: 05/16/19 1547   Order Status: Completed Specimen: Blood Updated: 05/17/19 1815    Culture, Blood 2 No Growth to date.  Any change in status will be called. Narrative:     ORDER#: [id number]                          ORDERED BY: Rachel Jaimes  SOURCE: Blood left wrist                   COLLECTED:  05/16/19 15:47  ANTIBIOTICS AT [facility]. :                      RECEIVED :  05/16/19 15:52  If child <=2 yrs old please draw pediatric bottle. ~Blood Culture #2  Performed at:  [facility]  [address] [employer] [address]   Phone [phone]   Culture Blood #1 [[id number]] Collected: 05/16/19 1547   Order Status: Completed Specimen: Blood Updated: 05/17/19 1815    Blood Culture, Routine No Growth to date.  Any change in status will be called. Narrative:     ORDER#: [id number]                          ORDERED BY: Rachel Jaimes  SOURCE: Blood left wrist                   COLLECTED:  05/16/19 15:47  ANTIBIOTICS AT [facility]. :                      RECEIVED :  05/16/19 15:52  If child <=2 yrs old please draw pediatric bottle. ~Blood Culture #1  Performed at:  [facility]  [address] [employer] [address]   Phone [phone]   Clostridium Difficile Toxin/Antigen [[id number]] (Abnormal) Collected: 05/16/19 5280 Order Status: Completed Specimen: Stool Updated: 05/17/19 0011    C difficile Toxin, EIA --Abnormal     POSITIVE for Clostridium difficile antigen and toxin   CONTACT PRECAUTIONS INDICATED   Normal Range: Negative   Abnormal    Narrative:     ORDER#: [id number]                          ORDERED BY: Yuliana North  SOURCE: Stool                              COLLECTED:  05/16/19 23:20  ANTIBIOTICS AT [facility]. :                      RECEIVED :  05/16/19 23:33  Collect White vial (sterile container)  Performed at:  [place] [address] [address]   Phone [phone]       IMAGING:    CT ABDOMEN PELVIS WO CONTRAST   Final Result   Pancolitis, infectious or inflammatory.       RECOMMENDATIONS:   1         XR CHEST PORTABLE   Final Result   No acute findings               All the pertinent images and reports for the current Hospitalization were reviewed by me     Scheduled Meds:   sotalol  40 mg Oral Daily    traZODone  50 mg Oral Nightly    venlafaxine  75 mg Oral Nightly    sodium chloride flush  10 mL Intravenous 2 times per day    metroNIDAZOLE  500 mg Intravenous Q8H    vancomycin  250 mg Oral 4x Daily    heparin (porcine)  5,000 Units Subcutaneous 3 times per day       Continuous Infusions:   sodium chloride 100 mL/hr at 05/20/19 0932       PRN Meds:  HYDROcodone 5 mg - acetaminophen, acetaminophen, tiZANidine, sodium chloride flush, ondansetron      Assessment:   Moderate to Severe C diff diarrhea  Pan colitis on CT scan   WBC elevation   Lactic acidosis  Recent antibiotic use  A fib+  SHANTEL on CKD    WBC remain elevated and creat still elevated lactic acidosis noted on admit and will add Fidaxomicin to the regimen and if she continues to be NPO not taking oral may have to consider parenteral Nutrition    Labs, Microbiology, Radiology and all the pertinent results from current hospitalization and  care every where were reviewed  by me as a part of the evaluation   Plan:   1. Cont Oral Vancomycin x 250 mg Q 6 HRS  2. Cont IV Flagyl  3. Add Fidaxomicin oral   4. Watch for Toxic megacolon  5. Risk for worsening and colectomy d/w pt  6.if no  Improvement may be a candidate for FMT ? Discussed with patient/Family  D/W RN and daughter at bed side     Thanks for allowing me to participate in your patient's care and please call me with any questions or concerns.     Diane Ferguson MD  Infectious Disease  [facility]) Physician  Phone: [phone]   Fax : [phone]

## 2019-05-20 NOTE — DISCHARGE INSTR - COC
Continuity of Care Form    Patient Name: Vidhya Meléndez   :  1930  MRN:  [id number]    Admit date:  2019  Discharge date:  ***    Code Status Order: Full Code   Advance Directives:   [facility] Documentation     Date/Time Healthcare Directive Type of Healthcare Directive Copy in [facility] [address] Agent's Name Healthcare Agent's Phone Number    19 2224  No, patient does not have an advance directive for healthcare treatment -- -- -- -- --          Admitting Physician:  Breanna Scherer MD  PCP: Bria Moncada MD    Discharging Nurse: [facility] Unit/Room#: D0S-4913/3121-01  Discharging Unit Phone Number: ***    Emergency Contact:   Extended Emergency Contact Information  Primary Emergency Contact: Chay Espin[address] [place] [address] Phone: [phone]  Relation: Child  Secondary Emergency Contact: [address] [place] [address] Phone: [phone]  Relation: Child    Past Surgical History:  Past Surgical History:   Procedure Laterality Date    CARPAL TUNNEL RELEASE      CATARACT REMOVAL      EYE SURGERY      OTHER SURGICAL HISTORY  2017    Watchman procedure    SPINE SURGERY      TOTAL KNEE ARTHROPLASTY Bilateral        Immunization History:   Immunization History   Administered Date(s) Administered    Influenza, Quadv, 6 mo and older, IM, PF (Flulaval, Fluarix) 2019    Tdap (Boostrix, Adacel) 2017       Active Problems:  Patient Active Problem List   Diagnosis Code    Primary localized osteoarthrosis, lower leg M17.10    Back pain M54.9    Low back pain M54.5    Facet arthropathy M47.819    DDD (degenerative disc disease), lumbar M51.36    Atrial fibrillation ([address] I48.91    Shortness of breath R06.02    Hypertension I10    Hyperlipidemia E78.5    Anemia D64.9    Frequent falls R29.6    Permanent atrial fibrillation ([address] I48.2    Acute encephalopathy G93.40    Confusion R41.0    Moderate malnutrition ([address] E44.0    C. difficile colitis A04.72    Infection requiring contact isolation precautions B99.9    Acute renal failure (ARF) (HCC) N17.9    Pancolitis (HCC) K51.00    Acute kidney injury ([address] N17.9    Dehydration E86.0    Coronary artery disease due to lipid rich plaque I25.10, I25.83    Overweight (BMI 25.0-29. 9) E66.3    Weight loss counseling, encounter for Z71.3       Isolation/Infection:   Isolation          C Diff Contact        Patient Infection Status     Infection Encounter Level? Onset Date Added Added By Resolved Resolved By Review Date    C-diff (Clostridium difficile) Yes 19 Clostridium Difficile Toxin/Antigen   19          Nurse Assessment:  Last Vital Signs: BP (!) 107/55   Pulse 64   Temp 97.7 °F (36.5 °C) (Oral)   Resp 14   Ht 4' 9\" (1.448 m)   Wt 136 lb 14.5 oz (62.1 kg)   SpO2 98%   BMI 29.63 kg/m²     Last documented pain score (0-10 scale): Pain Level: 0  Last Weight:   Wt Readings from Last 1 Encounters:   19 136 lb 14.5 oz (62.1 kg)     Mental Status:  {IP PT MENTAL STATUS:22065}    IV Access:  { TIA IV ACCESS:655196405}    Nursing Mobility/ADLs:  Walking   {P DME QIVW:946016456}  Transfer  {[facility] DME GHHM:153109922}  Bathing  {[facility] DME PIVK:761032623}  Dressing  {[facility] DME CWQP:555231813}  Toileting  {[facility] DME FKDZ:413218359}  Feeding  {[facility] DME QXES:031292594}  Med Admin  {[facility] DME OQHW:276420790}  Med Delivery   { TIA MED Delivery:443187825}    Wound Care Documentation and Therapy:        Elimination:  Continence:   · Bowel: {YES / JJ:98358}  · Bladder: {YES / T}  Urinary Catheter: {Urinary Catheter:081749371}   Colostomy/Ileostomy/Ileal Conduit: {YES / JJ:22645}       Date of Last BM: ***    Intake/Output Summary (Last 24 hours) at 2019 1322  Last data filed at 2019 0931  Gross per 24 hour   Intake 1276.57 ml   Output --   Net 1276.57 ml     I/O last 3 completed shifts:   In: 1366.6 [P.O.:480; I.V.:886.6]  Out: -     Safety Concerns:     [address] TIA Safety Concerns:576201590}    Impairments/Disabilities:      [address] [facility] Impairments/Disabilities:341509182}    Nutrition Therapy:  Current Nutrition Therapy:   [address] [facility] Diet List:669914684}    Routes of Feeding: {CHP DME Other Feedings:900264291}  Liquids: {Slp liquid thickness:46751}  Daily Fluid Restriction: {CHP DME Yes amt example:081662355}  Last Modified Barium Swallow with Video (Video Swallowing Test): {Done Not Done JNJL:342697144}    Treatments at the Time of Hospital Discharge:   Respiratory Treatments: ***  Oxygen Therapy:  {Therapy; copd oxygen:32079}  Ventilator:    {[facility] Vent [facility]}    Rehab Therapies: {THERAPEUTIC INTERVENTION:3215888021}  Weight Bearing Status/Restrictions: [address]  Weight Bearin}  Other Medical Equipment (for information only, NOT a DME order):  {EQUIPMENT:464498622}  Other Treatments: ***    Patient's personal belongings (please select all that are sent with patient):  {[facility] DME Belongings:343190432}    RN SIGNATURE:  {Esignature:828614947}    CASE MANAGEMENT/SOCIAL WORK SECTION    Inpatient Status Date: ***    Readmission Risk Assessment Score:  Readmission Risk              Risk of Unplanned Readmission:        22           Discharging to Facility/ Agency   · Name:   · Address:  · Phone:  · Fax:    Dialysis Facility (if applicable)   · Name:  · Address:  · Dialysis Schedule:  · Phone:  · Fax:    / signature: {Esignature:527340517}    PHYSICIAN SECTION    Prognosis: {Prognosis:9397795786}    Condition at Discharge: [address] Patient Condition:501397082}    Rehab Potential (if transferring to Rehab): {Prognosis:3219235596}    Recommended Labs or Other Treatments After Discharge: ***    Physician Certification: I certify the above information and transfer of Jorge Tomas  is necessary for the continuing treatment of the diagnosis listed and that she requires {Admit to Appropriate Level of Care:33240} for {GREATER/LESS:598776299} 30 days.      Update Admission H&P: {CHP DME Changes in BPLQF:385458110}    PHYSICIAN SIGNATURE:  {Esignature:458025048}

## 2019-05-20 NOTE — PLAN OF CARE
Problem: Falls - Risk of:  Goal: Will remain free from falls  Description  Will remain free from falls  Outcome: Ongoing  Note:   Patient educated on fall prevention. Call light is within reach, bed locked in lowest position, personal items within reach, and bed alarm is on. Will round on patient per unit guidelines. Problem: Falls - Risk of:  Goal: Absence of physical injury  Description  Absence of physical injury  Outcome: Ongoing  Note:   Pt assessed for fall risk and fall precautions put into place. Bed in lowest position and wheels locked, call light within reach. Nonskid footwear in place. Patient educated on appropriate method of transfer and to call for assistance. Problem: Risk for Impaired Skin Integrity  Goal: Tissue integrity - skin and mucous membranes  Description  Structural intactness and normal physiological function of skin and  mucous membranes. Outcome: Ongoing  Note:   Will monitor skin and mucous members. Will turn patient every 2 hours, monitor for friction and sheering, and change dressings as needed. Will preform skin assessment every shift. Problem: Nutrition  Goal: Optimal nutrition therapy  Outcome: Ongoing  Note:   Educated patient on importance of proper nutrition. Intake is being recorded to ensure optimal nutrition. Will consult dietitian as needed. Problem: Bowel/Gastric:  Goal: Occurrences of diarrhea will decrease  Description  Occurrences of diarrhea will decrease  Outcome: Ongoing  Note:   Patient will report decreased amount of diarrhea     Problem: Physical Regulation:  Goal: Prevent transmision of infection  Description  Prevent transmision of infection  Outcome: Ongoing  Note:   Patient will be able to state how infection is spread and patient will follow steps to avoid transmission.       Problem: Skin Integrity:  Goal: Risk for impaired skin integrity will decrease  Description  Risk for impaired skin integrity will decrease  Outcome: Ongoing  Note:   Will monitor skin and mucous members. Will turn patient every 2 hours, monitor for friction and sheering, and change dressings as needed. Will preform skin assessment every shift. Problem: Pain:  Goal: Pain level will decrease  Description  Pain level will decrease  Outcome: Ongoing  Note:   Educated patient on pain management. Will assess patients pain level per unit protocol, and provide pain management measures as needed. Problem: Pain:  Goal: Control of acute pain  Description  Control of acute pain  Outcome: Ongoing  Note:   Patient educated on acute pain. Taught patient to use call light to ask for pain medication. PRN pain medication given for acute pain. Will continue to monitor pain per unit protocol. Problem: Pain:  Goal: Control of chronic pain  Description  Control of chronic pain  Outcome: Ongoing  Note:   Patient educated on chronic pain. Taught patient to use call light to ask for pain medication. Will continue to monitor pain per unit protocol. Problem: Fluid Volume:  Goal: Will show no signs and symptoms of electrolyte imbalance  Description  Will show no signs and symptoms of electrolyte imbalance  Note:   Vitals signs are stable.   Intake and output are being recorded, will continue to monitor

## 2019-05-20 NOTE — PROGRESS NOTES
Occupational Therapy  Facility/Department: [address] ORTHOPEDICS  Daily Treatment Note  NAME: Kirsten Moore  : 1930  MRN: [id number]    Date of Service: 2019    Discharge Recommendations:  Patient would benefit from continued therapy after discharge, 3-5 sessions per week       Kirsten Moore scored a 13/24 on the AM-PAC ADL Inpatient form. Current research shows that an AM-PAC score of 17 or less is typically not associated with a discharge to the patient's home setting. Based on the patients AM-PAC score and their current ADL deficits, it is recommended that the patient have 3-5 sessions per week of Occupational Therapy at d/c to increase the patients independence. Assessment: Discussed with OTR am pac score is 13 which indicates need for continued skillled OT to increase [place] and decrease caregiver burden. Patient completes transfer and functional mobility from bed to recliner chair with Min/Mod A of 2. Able to wash face, hands and brush hair seated in recliner chair with SBA. Continue with POC. Patient Diagnosis(es): The primary encounter diagnosis was Pancolitis [facility]). Diagnoses of Acute kidney injury ([address] Dehydration, and Hyperkalemia were also pertinent to this visit. has a past medical history of Atrial fibrillation ([address] Carpal tunnel syndrome, bilateral, Cataract, CHF (congestive heart failure) ([address] Clostridium difficile diarrhea, Hyperlipidemia, Hypertension, and MI (myocardial infarction) ([address] has a past surgical history that includes Total knee arthroplasty (Bilateral); Spine surgery; Carpal tunnel release; eye surgery; Cataract removal; and other surgical history (2017).     Restrictions  Restrictions/Precautions  Restrictions/Precautions: Fall Risk  Position Activity Restriction  Other position/activity restrictions: c-diff contact precautions  Subjective   General  Chart Reviewed: Yes  Patient assessed for rehabilitation services?: Yes  Additional Pertinent Hx: Pt is an 79 yo female admitted with fatigue and diarrhea. She was recently on Abx for a UTI. CT abdomen showed pancolitis. Response to previous treatment: Patient reporting fatigue but able to participate(with encouragement)  Family / Caregiver Present: No  Referring Practitioner: Patrick Sebastian  Diagnosis: c diff pancolitis, sepsis, SHANTEL  Subjective  Subjective: Patient supine in bed upon arrival to room. Patient requires encouragement to get OOB and into chair. Reports generalized pain at 10/10      Orientation  Orientation  Overall Orientation Status: Impaired  Orientation Level: Oriented to person;Disoriented to time;Disoriented to situation;Oriented to place  Objective    ADL  Grooming: Setup;Stand by assistance(washed face, hands and and brushed hair seated in recliner chair with SBA and encouargement)        Balance  Sitting Balance: Contact guard assistance(seated on edge of bed)  Standing Balance: Moderate assistance  Standing Balance  Activity: Static standing with RW  Comment: Noted to have post lean with standing  Functional Mobility  Functional - Mobility Device: Rolling Walker  Activity: Other  Assist Level: Moderate assistance(MIn/Mod A of 2)  Functional Mobility Comments: Functional mobility with RW very short distance from bed to recliner chair with Min/Mod A of 2, no overt LOB noted however does have a slight post lean  Bed mobility  Supine to Sit: Moderate assistance(HOB elevated and side rail)  Sit to Supine: Unable to assess(up in chair at end of session)  Transfers  Sit to stand: Moderate assistance  Stand to sit: Moderate assistance  Transfer Comments: Transfer to recliner chair with Min/Mod A of 2 with cues for hand placement and safety        Cognition  Overall Cognitive Status: Exceptions  Following Commands: Follows one step commands with increased time; Follows one step commands with repetition  Attention Span: Attends with cues to redirect; Difficulty dividing attention  Memory: Decreased short term memory;Decreased recall of recent events  Safety Judgement: Decreased awareness of need for safety  Insights: Decreased awareness of deficits  Initiation: Requires cues for all  Sequencing: Requires cues for all       Assessment   Performance deficits / Impairments: Decreased functional mobility ; Decreased ADL status; Decreased strength;Decreased safe awareness;Decreased cognition;Decreased endurance;Decreased balance;Decreased high-level IADLs  Assessment: Discussed with OTR am pac score is 13 which indicates need for continued skillled OT to increase [place] and decrease caregiver burden. Patient completes transfer and functional mobility from bed to recliner chair with Min/Mod A of 2. Able to wash face, hands and brush hair seated in recliner chair with SBA. Continue with POC. Patient Education: Pt educated on the role of OT, POC, safety/transfers, orientation information   REQUIRES OT FOLLOW UP: Yes  Activity Tolerance  Activity Tolerance: Patient limited by fatigue  Safety Devices  Safety Devices in place: [facility] informed)  Type of devices: Call light within reach;Gait belt;Left in chair;Nurse notified; Chair alarm in place          Plan   Plan  Times per week: 3-5  Times per day: Daily  Current Treatment Recommendations: Strengthening, Balance Training, Functional Mobility Training, Endurance Training, Cognitive Reorientation, Safety Education & Training, Self-Care / ADL, Cognitive/Perceptual Training      AM-PAC Score        AM-[facility] Inpatient Daily Activity Raw Score: 13  AM-PAC Inpatient ADL T-Scale Score : 32.03  ADL Inpatient CMS 0-100% Score: 63.03  ADL Inpatient CMS G-Code Modifier : CL    Goals  Short term goals  Time Frame for Short term goals: Prior to d/c; All goals ongoing   Short term goal 1: Pt will bathe with min A. Short term goal 2: Pt will dress with min A. Short term goal 3: Pt will toilet with min A.   Short term goal 4: Pt will complete fxl mobility and fxl transfers to/from ADL surfaces with SBA using AD. Short term goal 5: Pt will complete grooming at sink with SBA. Short term goal 6: Pt will increase activity tolerance to achieve the above goals. Long term goals  Time Frame for Long term goals : STG=LTG  Patient Goals   Patient goals : to return home.         Therapy Time   Individual Concurrent Group Co-treatment   Time In 6299         Time Out 1125         Minutes 45                 Electronically signed by Anais Romano [id number] on 5/20/2019 at 11:27 AM    If discharged prior to next OT session please see last daily note for discharge status

## 2019-05-21 LAB
ANION GAP SERPL CALCULATED.3IONS-SCNC: 12 MMOL/L (ref 3–16)
BANDED NEUTROPHILS RELATIVE PERCENT: 1 % (ref 0–7)
BASOPHILS ABSOLUTE: 0 K/UL (ref 0–0.2)
BASOPHILS RELATIVE PERCENT: 0 %
BLOOD CULTURE, ROUTINE: NORMAL
BUN BLDV-MCNC: 42 MG/DL (ref 7–20)
CALCIUM SERPL-MCNC: 8.2 MG/DL (ref 8.3–10.6)
CHLORIDE BLD-SCNC: 107 MMOL/L (ref 99–110)
CO2: 19 MMOL/L (ref 21–32)
CREAT SERPL-MCNC: 1.4 MG/DL (ref 0.6–1.2)
CULTURE, BLOOD 2: NORMAL
EKG ATRIAL RATE: 93 BPM
EKG DIAGNOSIS: NORMAL
EKG Q-T INTERVAL: 362 MS
EKG QRS DURATION: 102 MS
EKG QTC CALCULATION (BAZETT): 445 MS
EKG R AXIS: -43 DEGREES
EKG T AXIS: 144 DEGREES
EKG VENTRICULAR RATE: 91 BPM
EOSINOPHILS ABSOLUTE: 0 K/UL (ref 0–0.6)
EOSINOPHILS RELATIVE PERCENT: 0 %
GFR AFRICAN AMERICAN: 43
GFR NON-AFRICAN AMERICAN: 35
GLUCOSE BLD-MCNC: 86 MG/DL (ref 70–99)
HCT VFR BLD CALC: 48.1 % (ref 36–48)
HEMOGLOBIN: 15.4 G/DL (ref 12–16)
LYMPHOCYTES ABSOLUTE: 1.9 K/UL (ref 1–5.1)
LYMPHOCYTES RELATIVE PERCENT: 5 %
MAGNESIUM: 2.2 MG/DL (ref 1.8–2.4)
MCH RBC QN AUTO: 30.7 PG (ref 26–34)
MCHC RBC AUTO-ENTMCNC: 32 G/DL (ref 31–36)
MCV RBC AUTO: 95.9 FL (ref 80–100)
METAMYELOCYTES RELATIVE PERCENT: 3 %
MONOCYTES ABSOLUTE: 1.9 K/UL (ref 0–1.3)
MONOCYTES RELATIVE PERCENT: 5 %
MYELOCYTE PERCENT: 3 %
NEUTROPHILS ABSOLUTE: 35 K/UL (ref 1.7–7.7)
NEUTROPHILS RELATIVE PERCENT: 83 %
PDW BLD-RTO: 15 % (ref 12.4–15.4)
PLATELET # BLD: 311 K/UL (ref 135–450)
PMV BLD AUTO: 8.9 FL (ref 5–10.5)
POTASSIUM SERPL-SCNC: 4.2 MMOL/L (ref 3.5–5.1)
RBC # BLD: 5.01 M/UL (ref 4–5.2)
SODIUM BLD-SCNC: 138 MMOL/L (ref 136–145)
TOXIC GRANULATION: PRESENT
WBC # BLD: 38.9 K/UL (ref 4–11)

## 2019-05-21 PROCEDURE — 80048 BASIC METABOLIC PNL TOTAL CA: CPT

## 2019-05-21 PROCEDURE — 6360000002 HC RX W HCPCS: Performed by: NURSE PRACTITIONER

## 2019-05-21 PROCEDURE — 94760 N-INVAS EAR/PLS OXIMETRY 1: CPT

## 2019-05-21 PROCEDURE — 99222 1ST HOSP IP/OBS MODERATE 55: CPT | Performed by: SURGERY

## 2019-05-21 PROCEDURE — 6370000000 HC RX 637 (ALT 250 FOR IP): Performed by: INTERNAL MEDICINE

## 2019-05-21 PROCEDURE — 83735 ASSAY OF MAGNESIUM: CPT

## 2019-05-21 PROCEDURE — 6360000002 HC RX W HCPCS: Performed by: INTERNAL MEDICINE

## 2019-05-21 PROCEDURE — 36415 COLL VENOUS BLD VENIPUNCTURE: CPT

## 2019-05-21 PROCEDURE — APPSS15 APP SPLIT SHARED TIME 0-15 MINUTES: Performed by: NURSE PRACTITIONER

## 2019-05-21 PROCEDURE — 6370000000 HC RX 637 (ALT 250 FOR IP)

## 2019-05-21 PROCEDURE — 2580000003 HC RX 258: Performed by: INTERNAL MEDICINE

## 2019-05-21 PROCEDURE — 1200000000 HC SEMI PRIVATE

## 2019-05-21 PROCEDURE — 6370000000 HC RX 637 (ALT 250 FOR IP): Performed by: FAMILY MEDICINE

## 2019-05-21 PROCEDURE — 85025 COMPLETE CBC W/AUTO DIFF WBC: CPT

## 2019-05-21 PROCEDURE — 2500000003 HC RX 250 WO HCPCS: Performed by: INTERNAL MEDICINE

## 2019-05-21 PROCEDURE — 6370000000 HC RX 637 (ALT 250 FOR IP): Performed by: SURGERY

## 2019-05-21 PROCEDURE — 99232 SBSQ HOSP IP/OBS MODERATE 35: CPT | Performed by: INTERNAL MEDICINE

## 2019-05-21 PROCEDURE — 2580000003 HC RX 258: Performed by: NURSE PRACTITIONER

## 2019-05-21 PROCEDURE — APPNB15 APP NON BILLABLE TIME 0-15 MINS: Performed by: NURSE PRACTITIONER

## 2019-05-21 RX ORDER — LACTOBACILLUS RHAMNOSUS GG 10B CELL
1 CAPSULE ORAL
Status: DISCONTINUED | OUTPATIENT
Start: 2019-05-21 | End: 2019-05-27 | Stop reason: HOSPADM

## 2019-05-21 RX ADMIN — FIDAXOMICIN 200 MG: 200 TABLET, FILM COATED ORAL at 09:16

## 2019-05-21 RX ADMIN — VANCOMYCIN HYDROCHLORIDE 250 MG: 250 CAPSULE ORAL at 09:00

## 2019-05-21 RX ADMIN — VANCOMYCIN HYDROCHLORIDE 250 MG: 250 CAPSULE ORAL at 17:59

## 2019-05-21 RX ADMIN — HEPARIN SODIUM 5000 UNITS: 5000 INJECTION INTRAVENOUS; SUBCUTANEOUS at 06:06

## 2019-05-21 RX ADMIN — Medication 10 ML: at 21:01

## 2019-05-21 RX ADMIN — ONDANSETRON 4 MG: 2 INJECTION INTRAMUSCULAR; INTRAVENOUS at 09:00

## 2019-05-21 RX ADMIN — METRONIDAZOLE 500 MG: 500 INJECTION, SOLUTION INTRAVENOUS at 18:00

## 2019-05-21 RX ADMIN — VANCOMYCIN HYDROCHLORIDE 500 MG: 1 INJECTION, POWDER, LYOPHILIZED, FOR SOLUTION INTRAVENOUS at 19:03

## 2019-05-21 RX ADMIN — HEPARIN SODIUM 5000 UNITS: 5000 INJECTION INTRAVENOUS; SUBCUTANEOUS at 14:00

## 2019-05-21 RX ADMIN — HYDROCODONE BITARTRATE AND ACETAMINOPHEN 1 TABLET: 5; 325 TABLET ORAL at 09:00

## 2019-05-21 RX ADMIN — VANCOMYCIN HYDROCHLORIDE 250 MG: 250 CAPSULE ORAL at 13:59

## 2019-05-21 RX ADMIN — Medication 1 CAPSULE: at 18:00

## 2019-05-21 RX ADMIN — HYDROCODONE BITARTRATE AND ACETAMINOPHEN 1 TABLET: 5; 325 TABLET ORAL at 02:19

## 2019-05-21 RX ADMIN — HYDROCODONE BITARTRATE AND ACETAMINOPHEN 1 TABLET: 5; 325 TABLET ORAL at 21:01

## 2019-05-21 RX ADMIN — METRONIDAZOLE 500 MG: 500 INJECTION, SOLUTION INTRAVENOUS at 09:01

## 2019-05-21 RX ADMIN — TRAZODONE HYDROCHLORIDE 50 MG: 50 TABLET ORAL at 21:01

## 2019-05-21 RX ADMIN — VANCOMYCIN HYDROCHLORIDE 250 MG: 250 CAPSULE ORAL at 21:02

## 2019-05-21 RX ADMIN — SOTALOL HYDROCHLORIDE 40 MG: 80 TABLET ORAL at 09:01

## 2019-05-21 RX ADMIN — Medication 1 CAPSULE: at 14:00

## 2019-05-21 RX ADMIN — VENLAFAXINE HYDROCHLORIDE 75 MG: 75 CAPSULE, EXTENDED RELEASE ORAL at 21:02

## 2019-05-21 RX ADMIN — FIDAXOMICIN 200 MG: 200 TABLET, FILM COATED ORAL at 21:01

## 2019-05-21 RX ADMIN — METRONIDAZOLE 500 MG: 500 INJECTION, SOLUTION INTRAVENOUS at 00:56

## 2019-05-21 RX ADMIN — HEPARIN SODIUM 5000 UNITS: 5000 INJECTION INTRAVENOUS; SUBCUTANEOUS at 22:23

## 2019-05-21 ASSESSMENT — PAIN DESCRIPTION - PROGRESSION
CLINICAL_PROGRESSION: NOT CHANGED

## 2019-05-21 ASSESSMENT — PAIN DESCRIPTION - FREQUENCY
FREQUENCY: CONTINUOUS

## 2019-05-21 ASSESSMENT — PAIN DESCRIPTION - LOCATION
LOCATION: BACK
LOCATION: BACK;LEG
LOCATION: BACK

## 2019-05-21 ASSESSMENT — PAIN - FUNCTIONAL ASSESSMENT
PAIN_FUNCTIONAL_ASSESSMENT: ACTIVITIES ARE NOT PREVENTED

## 2019-05-21 ASSESSMENT — PAIN DESCRIPTION - ONSET
ONSET: ON-GOING

## 2019-05-21 ASSESSMENT — PAIN DESCRIPTION - ORIENTATION
ORIENTATION: RIGHT;MID
ORIENTATION: RIGHT;MID
ORIENTATION: RIGHT;LEFT;MID

## 2019-05-21 ASSESSMENT — PAIN SCALES - GENERAL
PAINLEVEL_OUTOF10: 6
PAINLEVEL_OUTOF10: 8
PAINLEVEL_OUTOF10: 10
PAINLEVEL_OUTOF10: 1
PAINLEVEL_OUTOF10: 8

## 2019-05-21 ASSESSMENT — PAIN DESCRIPTION - PAIN TYPE
TYPE: CHRONIC PAIN

## 2019-05-21 ASSESSMENT — PAIN DESCRIPTION - DESCRIPTORS
DESCRIPTORS: ACHING

## 2019-05-21 NOTE — PROGRESS NOTES
Infectious Disease Follow up Notes  Admit Date: 5/16/2019  Hospital Day: 6    Antibiotics : ORAL Vancomycin  IV Flagyl  Fidaxomicin     CHIEF COMPLAINT:     Severe C diff diarrhea  Pancolitis  WBC elevationn    Subjective interval History :  80 y.o. With A fib and recurrent UTI was recently hospitalized in April was treated with a course of IV abx followed by oral abx  Now admitted from home with acute onset Diarrhea and abd pain stool  C diff+ve and WBC elevation.       Sitting up in the bed  Has some mucoid stools no control per RN and UO has been good trying some clears but no appetite and WBC rise noted and pt is clear no surgery and no colonoscopy     Past Medical History:    Past Medical History:   Diagnosis Date    At[facility] ([facility]     Carpal tunnel syndrome, bilateral     Cataract     [facility] failure[facility]     Clostridium difficile diarrhea 05/16/2019    Hyperlipidemia     Hypertension     MI (myocardial infarction) ([facility]        Past Surgical History:    Past Surgical History:   Procedure Laterality Date    CARPAL TUNNEL RELEASE      CATARACT REMOVAL      EYE SURGERY      OTHER SURGICAL HISTORY  07/19/2017    Watchman procedure    SPINE SURGERY      TOTAL KNEE ARTHROPLASTY Bilateral        Current Medications:    Outpatient Medications Marked as Taking for the 5/16/19 encounter [facility] Encounter)   Medication Sig Dispense Refill    clopidogrel (PLAVIX) 75 MG tablet TAKE 1 TABLET BY MOUTH EVERY DAY 90 tablet 2    aspirin 81 MG chewable tablet Take 1 tablet by mouth daily 30 tablet 3    venlafaxine (EFFEXOR XR) 75 MG extended release capsule Take 75 mg by mouth nightly       traZODone (DESYREL) 50 MG tablet Take 1 tablet by mouth nightly 30 tablet 0    calcium carbonate 600 MG TABS tablet Take 1 tablet by mouth every evening       pravastatin (PRAVACHOL) 40 MG tablet Take 40 mg by mouth every evening.  omeprazole (PRILOSEC) 40 MG capsule Take 40 mg by mouth daily as needed (acid reflux) Take 40 mg by mouth daily.  lisinopril (PRINIVIL;ZESTRIL) 40 MG tablet Take 40 mg by mouth every evening          Allergies:  Elena advanced aspirin ex st [aspirin]    Immunizations :   Immunization History   Administered Date(s) Administered    Influenza, Quadv, 6 mo and older, IM, PF (Flulaval, Fluarix) 03/02/2019    Tdap (Boostrix, Adacel) 12/22/2017       Social History:   Social History     Tobacco Use    Smoking status: Never Smoker    Smokeless tobacco: Never Used   Substance Use Topics    Alcohol use: No    Drug use: No     Social History     Tobacco Use   Smoking Status Never Smoker   Smokeless Tobacco Never Used      Family History   Problem Relation Age of Onset    Other Mother     Other Father         REVIEW OF SYSTEMS:    No fever / chills / sweats. No weight loss. No visual change, eye pain, eye discharge. No oral lesion, sore throat, dysphagia. Denies cough / sputum/Sob   Denies chest pain, palpitations/ dizziness  Denies nausea/ vomiting/abdominal pain/diarrhea. Denies dysuria or change in urinary function. Denies joint swelling or pain. No myalgia, arthralgia. No rashes, skin lesions   Denies focal weakness, sensory change or other neurologic symptoms  No lymph node swelling or tenderness.     Diarrhea+ nausea poor appetite and no chills sob+    PHYSICAL EXAM:      Vitals:    /65   Pulse 70   Temp 97.5 °F (36.4 °C) (Oral)   Resp 16   Ht 4' 9\" (1.448 m)   Wt 141 lb 12.1 oz (64.3 kg)   SpO2 90%   BMI 30.68 kg/m²     General Appearance: alert,in no acute distress, + pallor, no icterus   Skin: warm and dry, no rash or erythema  Head: normocephalic and atraumatic  Eyes: pupils equal, round, and reactive to light, conjunctivae normal  ENT: tympanic membrane, external ear and ear canal normal bilaterally, nose without deformity, nasal mucosa and turbinates normal without polyps  Neck: supple and non-tender without mass, no thyromegaly  no cervical lymphadenopathy  Pulmonary/Chest: clear to auscultation bilaterally- no wheezes, rales or rhonchi, normal air movement, no respiratory distress  Cardiovascular: normal rate, regular rhythm, normal S1 and S2, no murmurs, rubs, clicks, or gallops, no carotid bruits  Abdomen: soft, -tender,  -distended, normal bowel sounds, no masses or organomegaly  Extremities: no cyanosis, clubbing or +  edema  Musculoskeletal: normal range of motion, no joint swelling, deformity or tenderness  Neurologic: reflexes normal and symmetric, no cranial nerve deficit  Psych:  Orientation, sensorium, mood normal  Lines:  IV      Data Review:    Lab Results   Component Value Date    WBC 38.9 (H) 05/21/2019    HGB 15.4 05/21/2019    HCT 48.1 (H) 05/21/2019    MCV 95.9 05/21/2019     05/21/2019     Lab Results   Component Value Date    CREATININE 1.4 (H) 05/21/2019    BUN 42 (H) 05/21/2019     05/21/2019    K 4.2 05/21/2019     05/21/2019    CO2 19 (L) 05/21/2019       Hepatic Function Panel:   Lab Results   Component Value Date    ALKPHOS 106 05/16/2019    ALT 13 05/16/2019    AST 19 05/16/2019    PROT 6.4 05/16/2019    BILITOT 0.3 05/16/2019    LABALBU 2.9 05/16/2019       UA:  Lab Results   Component Value Date    COLORU DARK YELLOW 05/16/2019    CLARITYU CLOUDY 05/16/2019    GLUCOSEU Negative 05/16/2019    BILIRUBINUR MODERATE 05/16/2019    KETUA Negative 05/16/2019    SPECGRAV 1.023 05/16/2019    BLOODU Negative 05/16/2019    PHUR 5.0 05/16/2019    PROTEINU 30 05/16/2019    UROBILINOGEN 1.0 05/16/2019    NITRU Negative 05/16/2019    LEUKOCYTESUR SMALL 05/16/2019    LABMICR YES 05/16/2019    URINETYPE Not Specified 05/16/2019      Urine Microscopic:   Lab Results   Component Value Date    LABCAST 3-5 Hyaline 05/16/2019    BACTERIA RARE 02/28/2019    COMU see below 05/16/2019    HYALCAST 0 02/28/2019    WBCUA 3 05/16/2019    RBCUA 2 05/16/2019    EPIU 2 05/16/2019     Creat  2.5  Down to  1.4     BNP  6433    Wbc  38.9     MICRO: cultures reviewed and updated by me              Urine Culture [[id number]] Collected: 05/16/19 1547   Order Status: Completed Specimen: Urine, clean catch Updated: 05/18/19 0918    Urine Culture, Routine No growth at 18-36 hours   Narrative:     ORDER#: [id number]                          ORDERED BY: Cori Hernandez  SOURCE: Urine Clean Catch                  COLLECTED:  05/16/19 15:47  ANTIBIOTICS AT [facility]. :                      RECEIVED :  05/16/19 16:43  Performed at:  [facility]  [address] [facility] [place] [employer] [address]   Phone [phone]   Culture Blood #2 [[id number]] Collected: 05/16/19 1547   Order Status: Completed Specimen: Blood Updated: 05/17/19 1815    Culture, Blood 2 No Growth to date.  Any change in status will be called. Narrative:     ORDER#: [id number]                          ORDERED BY: Cori Hernandez  SOURCE: Blood left wrist                   COLLECTED:  05/16/19 15:47  ANTIBIOTICS AT [facility]. :                      RECEIVED :  05/16/19 15:52  If child <=2 yrs old please draw pediatric bottle. ~Blood Culture #2  Performed at:  [facility]  [address] [place]  [facility] [place] [address]   Phone [phone]   Culture Blood #1 [[id number]] Collected: 05/16/19 1547   Order Status: Completed Specimen: Blood Updated: 05/17/19 1815    Blood Culture, Routine No Growth to date.  Any change in status will be called. Narrative:     ORDER#: [id number]                          ORDERED BY: Cori Hernandez  SOURCE: Blood left wrist                   COLLECTED:  05/16/19 15:47  ANTIBIOTICS AT [facility]. :                      RECEIVED :  05/16/19 15:52  If child <=2 yrs old please draw pediatric bottle. ~Blood Culture #1  Performed at:  [facility]  [address] [facility] [place] [place] [address] [address]   Phone [phone]   Clostridium Difficile Toxin/Antigen [[id number]] (Abnormal) Collected: 05/16/19 2320   Order Status: Completed Specimen: Stool Updated: 05/17/19 0011    C difficile Toxin, EIA --Abnormal     POSITIVE for Clostridium difficile antigen and toxin   CONTACT PRECAUTIONS INDICATED   Normal Range: Negative   Abnormal    Narrative:     ORDER#: [id number]                          ORDERED BY: Sheryl Agustin  SOURCE: Stool                              COLLECTED:  05/16/19 23:20  ANTIBIOTICS AT [facility]. :                      RECEIVED :  05/16/19 23:33  Collect White vial (sterile container)  Performed at:  [place] [address]   Phone [phone]       IMAGING:    XR CHEST STANDARD (2 VW)   Final Result   No acute cardiopulmonary abnormality is identified. Cardiac silhouette is enlarged, similar to prior study. CT ABDOMEN PELVIS WO CONTRAST   Final Result   Pancolitis, infectious or inflammatory.       RECOMMENDATIONS:   1         XR CHEST PORTABLE   Final Result   No acute findings               All the pertinent images and reports for the current Hospitalization were reviewed by me     Scheduled Meds:   Fidaxomicin  200 mg Oral BID    sotalol  40 mg Oral Daily    traZODone  50 mg Oral Nightly    venlafaxine  75 mg Oral Nightly    sodium chloride flush  10 mL Intravenous 2 times per day    metroNIDAZOLE  500 mg Intravenous Q8H    vancomycin  250 mg Oral 4x Daily    heparin (porcine)  5,000 Units Subcutaneous 3 times per day       Continuous Infusions:      PRN Meds:  HYDROcodone 5 mg - acetaminophen, acetaminophen, tiZANidine, sodium chloride flush, ondansetron      Assessment:   Moderate to Severe C diff diarrhea  Pan colitis on CT scan   WBC elevation   Lactic acidosis  Recent antibiotic use  A fib+  SHANTEL on CKD    WBC remain elevated and creat still elevated lactic acidosis noted on admit and added Fidaxomicin to the regimen    WBC cont to rise but creat trend down and exam no guarding but distension noted and d/w pt in case she took a turn for the worse she does not want Surgery or Colonoscopy for FMT either       She is clear about - \"\"that she would go to [facility] if [facility] takes me and I am counting my days \"    Labs, Microbiology, Radiology and all the pertinent results from current hospitalization and  care every where were reviewed  by me as a part of the evaluation   Plan:   1. Cont Oral Vancomycin x 250 mg Q 6 HRS  2. Cont IV Flagyl  3. Cont oral Fidaxomicin   4. Watch for Toxic megacolon  5. Risk for worsening and colectomy d/w pt but decided no surgery   6.if no  Improvement may be a candidate for FMT but pt decided against interventions    7. DNR -CC noted     Discussed with patient/Family  D/W RN     Thanks for allowing me to participate in your patient's care and please call me with any questions or concerns.     Jessica Araiza MD  Infectious Disease  [facility]) Physician  Phone: [phone]   Fax : [phone]

## 2019-05-21 NOTE — PROGRESS NOTES
Vancomycin enema given pt was unable to hold fluid it just came right back out pt. Perineum very red zinc ointment placed on skin.

## 2019-05-21 NOTE — PROGRESS NOTES
ADVANCED CARE PLANNING    Name:Marilynn Sapp       :  1930              MRN:  [id number]      Purpose of Encounter: Advanced care planning in light of cdiff pancolitis, advanced age. Parties in attendance: :Sally Hughes, APRN - NP, Family members:Son, [address]. Decisional Capacity:Yes    Subjective/Patient Story: Patient/family understand(s) that  her function continues to deteriorate. Patient/family no longer wishes further curative interventions, including hospitalization, if there is no long term benefit :Yes    Patient/family wish(es) to continue further interventions, patient but declines resuscitation: Yes    Patient/family wish(es) to continue further interventions and remain FULL code:  No    Objective/Medical Story: The patient is an 79 yo female, with a pmh of chronic systolic CHF, chronic atrial fibrillation, and CAD, admitted with fatigue and diarrhea. The patient noted several days of diarrhea prior to admission, with associated abd pain. She had recently been on Abx for a UTI. In the ER, a CT a/p showed pancolitis. The patient was found to be C diff positive. She was admitted for sepsis 2/2 c.diff pancolitis. IV flagyl and PO vancomycin were initiated. Infectious disease was consulted. Fidaxomicin was added. WBC continued to trend upward. General surgery was consulted due to the severity of symptoms. Vancomycin enemas were added to medical regimen. The patient made it very clear that she did not want surgery and was focused on quality of life. This was discussed with both the patient and her son. Code status was changed to [facility].       .    Goals of Care Determinations: Patient/family wish(es) to focus on Quality of life. Plan: Will notify Curry Charles MD of change in care plan. Will look at further interventions as needed. Code Status:  At this time patient wishes to be DNR-CC    Time Spent on Advanced Planning Discussion: 45 minutes    Advanced Care Planning Documents: DNR form, See progress notes from today        Electronically signed by CHERELLE Hernandez NP on 5/21/2019 at 4:31 PM  Thank you Marylene Bowler, MD for the opportunity to be involved in this patient's care. If you have any questions or concerns please feel free to contact me at [phone].

## 2019-05-21 NOTE — PROGRESS NOTES
Pt continues to have loose mucoid-like stools and she has very little control over them. She is weak and short of breathe on any kind of exertion.

## 2019-05-21 NOTE — PROGRESS NOTES
[facility] Medicine Progress Note      Admit Date: 5/16/2019         Overnight Events: No    CC: F/U for cdiff pancolitis    HPI: The patient is an 81 yo female, with a pmh of chronic systolic CHF, chronic atrial fibrillation, and CAD, admitted with fatigue and diarrhea. The patient noted several days of diarrhea prior to admission, with associated abd pain. She had recently been on Abx for a UTI. In the ER, a CT a/p showed pancolitis. The patient was found to be C diff positive. She was admitted for sepsis 2/2 c.diff pancolitis. IV flagyl and PO vancomycin were initiated. Infectious disease was consulted. Fidaxomicin was added. WBC continued to trend upward. General surgery was consulted due to the severity of symptoms. Vancomycin enemas were added to medical regimen. The patient made it very clear that she did not want surgery and was focused on quality of life. This was discussed with both the patient and her son. Code status was changed to [facility]. Interval History/Subjective: The patient continues to have loose bowel movements. ABD pain slightly improved. SOB improved. Seen by general surgery. Wants to be DNR CC      Review of Systems:     _____________________________________________________________________  General ROS:  [x] N/A [] Other:  _____________________________________________________________________  HEENT ROS:  [x] N/A [] Other:  _____________________________________________________________________  Respiratory ROS:  [] N/A [x] Other: SOB slightly improved. _____________________________________________________________________  Cardiovascular ROS:  [x] N/A [] Other:  _____________________________________________________________________   Gastrointestinal ROS:  [] N/A [x] Other: Diarrhea is slowing but still persists.   _____________________________________________________________________  Genitourinary:  [x] N/A [] (Oral)   Resp 16   Ht 4' 9\" (1.448 m)   Wt 141 lb 12.1 oz (64.3 kg)   SpO2 90%   BMI 30.68 kg/m²       Intake/Output Summary (Last 24 hours) at 5/21/2019 0913  Last data filed at 5/21/2019 0600  Gross per 24 hour   Intake 1130 ml   Output --   Net 1130 ml       General: Alert and oriented. Sitting up at EOB in NAD. Pleasant and cooperative. Son at bedside. HEENT: Normocephalic. Atraumatic. Pupils equal and reactive. EOM intact. Oral mucosa pink/moist/intact. Neck: Supple. Symmetrical. Trachea midline. Lungs: Clear to auscultation bilaterally. Respirations even and unlabored. Chest: Exam unremarkable. Cardiac: S1/S2 noted. Regular Rhythm and rate. Abdomen/GI: Soft. Mildly tender t/o. Distended. BS+. Extremities: PP+. Atraumatic. No redness/cyanosis/edema noted. Brisk cap refill. Skin: Dry and intact. No lesions noted. Neuro: Grossly intact. No focal deficits noted. LABS:    Lab Results   Component Value Date    WBC 38.9 (H) 05/21/2019    HGB 15.4 05/21/2019    HCT 48.1 (H) 05/21/2019    MCV 95.9 05/21/2019     05/21/2019    LYMPHOPCT 6.0 05/20/2019    RBC 5.01 05/21/2019    MCH 30.7 05/21/2019    MCHC 32.0 05/21/2019    RDW 15.0 05/21/2019       Lab Results   Component Value Date    CREATININE 1.4 (H) 05/21/2019    BUN 42 (H) 05/21/2019     05/21/2019    K 4.2 05/21/2019     05/21/2019    CO2 19 (L) 05/21/2019       Lab Results   Component Value Date    MG 2.20 05/21/2019       Lab Results   Component Value Date    ALT 13 05/16/2019    AST 19 05/16/2019    ALKPHOS 106 05/16/2019    BILITOT 0.3 05/16/2019        No flowsheet data found. Imaging:  XR CHEST STANDARD (2 VW)   Final Result   No acute cardiopulmonary abnormality is identified. Cardiac silhouette is enlarged, similar to prior study. CT ABDOMEN PELVIS WO CONTRAST   Final Result   Pancolitis, infectious or inflammatory.       RECOMMENDATIONS:   1         XR CHEST PORTABLE   Final Result   No acute findings Assessment & Plan:      Sepsis, POA  2/2 c.diff pancolitis  On IV flagyl, PO Vancomycin, dificid, vancomycin enemas  Treat underlying cause    C.diff pancolitis  ID following. Appreciate assistance  IV flagyl. PO Vancomycin. Dificid. - Vancomycin enemas added  Adjust abx per ID rec  Leukocytosis worsening  Surgical consult. Appreciate recs  - Does not want surgery/ostomy under any circumstances  - Does not want fecal transplant    Acute renal failure   2/2 diarrhea  Baseline creatinine 0.8. Currently stable around 1.4. Avoid nephrotoxic medications as able. Renally dose medications. Monitor for electrolyte abnormalities. Follow labs    SOB  SOB 2/2 abd distention? Supplemental oxygen (titrate to SpO2 88-92%)  Treat underlying cause  Follow labs and vitals    Afib  Sotalol    Chronic pain  Continue home regimen    Hypotension  2/2 diarrhea/dehydration  Has received IVFs - BP now improved. Continue to monitor closely. Body mass index is 30.68 kg/m². The patient and / or the family were informed of the results of any tests, a time was given to answer questions, a plan was proposed and they agreed with plan.     DVT prophylaxis: [] Lovenox  [x] SQ Heparin  [] SCDs because of  [] warfarin/oral direct thrombin inhibitor [] Encourage ambulation    GI prophylaxis: [] PPI/[id number]  [x] not indicated    Probiotic if on abx: [] Yes [] No [x] Not Indicated    Diet: DIET CLEAR LIQUID;  Dietary Nutrition Supplements: Clear Liquid Oral Supplement    Consults:  IP CONSULT TO DIETITIAN  IP CONSULT TO INFECTIOUS DISEASES    Disposition:  [] Home  [] Home with home health [] Rehab [] Psych [] SNF  [] LTAC  [] Long term nursing home or group home [] Transfer to ICU  [] Transfer to PCU [x] Other: TBD    Code Status: DNR-CC    ELOS: TBD      CHERELLE Stockton - DANIEL  05/21/19

## 2019-05-21 NOTE — CONSULTS
[address] and Vascular Surgery  [phone]        Surgery Consult     Pt Name: Tere Tee  MRN: [id number]  YOB: 1930  Date of evaluation: 5/21/2019  Primary Care Physician: Esthela Vazquez MD  Patient evaluated at the request of  CHERELLE Koehler    Chief Complaint   Patient presents with    Fatigue     x 3 days    Diarrhea         Assessment/Plan   C diff colitis, risk for toxic megacolon  -WBC continues to rise despite antibiotics. Not sure how much she is absorbing with   -continue antibiotics per ID. Will add vancomycin enemas.  -continue supportive care  -States she would rather \"go meet the Lord\" than have a surgery to remove her colon with end ileostomy. \"I'm 80years old. \"     SUBJECTIVE:   History of Chief Complaint:    Tere Tee is a 80 y.o. female PMH A fib s/p watchman procedure on plavix which is on hold, CHF, HLD, HTN, MI, no prior abdominal surgeries being seen in surgical consultation for c diff colitis. Admitted 5/16/2019 with c/o diarrhea, C diff returned positive. Completed a course of IV and oral antibiotics in April for a UTI. Her WBC continues to rise despite antibiotics, 38.9 today. She continues to have diarrhea. ID following, on oral vancomycin, IV flagyl. Fidaxomicin added 5/20. CT on admission 5/16 with infectious or inflammatory pancolitis. Past Medical History   has a past medical history of Atrial fibrillation ([address] Carpal tunnel syndrome, bilateral, Cataract, CHF (congestive heart failure) ([address] Clostridium difficile diarrhea, Hyperlipidemia, Hypertension, and MI (myocardial infarction) ([address] Past Surgical History   has a past surgical history that includes Total knee arthroplasty (Bilateral); Spine surgery; Carpal tunnel release; eye surgery; Cataract removal; and other surgical history (07/19/2017). Medications  Prior to Admission medications    Medication Sig Start Date End Date Taking?  Authorizing Provider   clopidogrel (PLAVIX) 75 MG tablet TAKE 1 TABLET BY MOUTH EVERY DAY 4/23/19  Yes Tyrese Perez MD   aspirin 81 MG chewable tablet Take 1 tablet by mouth daily 3/4/19  Yes Randy Quezada MD   venlafaxine (EFFEXOR XR) 75 MG extended release capsule Take 75 mg by mouth nightly    Yes Historical Provider, MD   traZODone (DESYREL) 50 MG tablet Take 1 tablet by mouth nightly 11/2/17  Yes Tyrese Perez MD   calcium carbonate 600 MG TABS tablet Take 1 tablet by mouth every evening    Yes Historical Provider, MD   pravastatin (PRAVACHOL) 40 MG tablet Take 40 mg by mouth every evening. Yes Florin Espinosa MD   omeprazole (PRILOSEC) 40 MG capsule Take 40 mg by mouth daily as needed (acid reflux) Take 40 mg by mouth daily. Yes Israel Smith MD   lisinopril (PRINIVIL;ZESTRIL) 40 MG tablet Take 40 mg by mouth every evening    Yes Historical Provider, MD   polyethylene glycol (GLYCOLAX) packet Take 17 g by mouth daily as needed for Constipation    Historical Provider, MD   tiZANidine (ZANAFLEX) 2 MG tablet Take 2 mg by mouth every 8 hours as needed (muscle spasms)     Historical Provider, MD   sotalol (BETAPACE) 80 MG tablet Take 40 mg by mouth daily     Historical Provider, MD    Scheduled Meds:   Fidaxomicin  200 mg Oral BID    sotalol  40 mg Oral Daily    traZODone  50 mg Oral Nightly    venlafaxine  75 mg Oral Nightly    sodium chloride flush  10 mL Intravenous 2 times per day    metroNIDAZOLE  500 mg Intravenous Q8H    vancomycin  250 mg Oral 4x Daily    heparin (porcine)  5,000 Units Subcutaneous 3 times per day     Continuous Infusions:  PRN Meds:. HYDROcodone 5 mg - acetaminophen, acetaminophen, tiZANidine, sodium chloride flush, ondansetron    Allergies  is allergic to chanelle advanced aspirin ex st [aspirin]. Family History  Reviewed, non contribtory  family history includes Other in her father and mother. Social History  Reviewed, non contributory   reports that she has never smoked.  She has never used smokeless tobacco. She reports that she does not drink alcohol or use drugs. Review of Systems:  General Denies any fever or chills  HEENT Denies any diplopia, tinnitus or vertigo  Resp Denies any shortness of breath, cough or wheezing  Cardiac Denies any chest pain, palpitations, claudication or edema  GI Denies any melena, hematochezia, hematemesis or pyrosis   Denies any frequency, urgency, hesitancy or incontinence  Heme Denies bruising or bleeding easily  Endocrine Denies any history of diabetes or thyroid disease  Neuro Denies any focal motor or sensory deficits    OBJECTIVE:   VITALS:  height is 4' 9\" (1.448 m) and weight is 141 lb 12.1 oz (64.3 kg). Her oral temperature is 97.5 °F (36.4 °C). Her blood pressure is 131/65 and her pulse is 70. Her respiration is 16 and oxygen saturation is 90%. CONSTITUTIONAL: Alert and oriented times 3, no acute distress and cooperative to examination with proper mood and affect. SKIN: Skin color, texture, turgor normal. No rashes or lesions. LYMPH: no cervical nodes, no inguinal nodes  HEENT: Head is normocephalic, atraumatic. EOMI, PERRLA. NECK: Supple, symmetrical, trachea midline, no adenopathy, thyroid symmetric, not enlarged and no tenderness, skin normal.  CHEST/LUNGS: chest symmetric with normal A/P diameter, normal respiratory rate and rhythm, lungs clear to auscultation without wheezes, rales or rhonchi. No accessory muscle use. CARDIOVASCULAR: Heart sounds are normal.  Regular rate and rhythm without murmur, gallop or rub. Carotid and femoral pulses 2+/4 and equal bilaterally. ABDOMEN: Soft, minimal distention, diffusely tender right side >left. RECTAL: deferred, not clinically indicated  NEUROLOGIC: There are no focalizing motor or sensory deficits. CN II-XII are grossly intact. Holli Navarro EXTREMITIES: no cyanosis, no clubbing and no edema.     LABS:     Recent Labs     05/19/19  0527 05/20/19  0526 05/21/19  0727   WBC 34.5* 34.3* 38.9*   HGB 13.8 13.7 15.4   HCT 43.1 41.8 48.1*    284 311   * 134* 138   K 3.6 5.2* 4.2    104 107   CO2 15* 17* 19*   BUN 52* 49* 42*   CREATININE 1.4* 1.5* 1.4*   MG 2.30 2.30 2.20   CALCIUM 7.6* 7.6* 8.2*     No results for input(s): ALKPHOS, ALT, AST, BILITOT, BILIDIR, LABALBU, AMYLASE, LIPASE in the last 72 hours. RADIOLOGY:   I have personally reviewed the following films:  XR CHEST STANDARD (2 VW)   Final Result   No acute cardiopulmonary abnormality is identified. Cardiac silhouette is enlarged, similar to prior study. CT ABDOMEN PELVIS WO CONTRAST   Final Result   Pancolitis, infectious or inflammatory. RECOMMENDATIONS:   1         XR CHEST PORTABLE   Final Result   No acute findings              Thank you for the interesting evaluation. Further recommendations to follow. Electronically signed by CHERELLE North CNP on 5/21/2019 at 11:18 AM      Agree with above note. The patient was personally seen and examined. Austen Green is an 81 yo female who presents with a 1 week history of abdominal pain and diarrhea.  + chills, no fevers. Denies nausea or emesis. Over the past few days she states she feels more bloated. She underwent CT imaging showing pancolitis and is cdiff +. No prior abdominal surgeries. Abd soft, ND, minimal bilateral abdominal pain R>L    WBC up to 38  Cr 1.4    CT abd/pelvis personally reviewed, results as above    A/P: 81 yo female with c diff colitis    Leukocytosis worsening. Was on oral vancomycin and flagyl IV. dificid started today. Due to bloating with concern for possible ileus, will add vancomycin enemas. Discussed the surgical option if she deteriorates, being a subtotal colectomy with end ileostomy. She is adamant that she does not surgery at all, and that she would prefer to die rather than have surgery. Patient states that her brother had an ostomy and she does not want that. Will follow and provide assistance.     Hank Berumen Susan Holley MD

## 2019-05-21 NOTE — PLAN OF CARE
Problem: Nutrition  Goal: Optimal nutrition therapy  5/21/2019 0937 by Pio Polk RD, LD  Outcome: Ongoing  5/21/2019 0018 by Mich Thacker RN  Outcome: Ongoing  Note:   Patient educated on proper nutrition. Patients intake monitored and patient assessed to make sure no assistance with eating was required. Patient encouraged to eat a well balanced diet. Nutrition Problem: Inadequate oral intake  Intervention: Food and/or Nutrient Delivery: Continue current diet, Continue current ONS  Nutritional Goals:  Tolerate diet and consume greater than 50% of meals and supplements

## 2019-05-21 NOTE — PLAN OF CARE
Problem: Falls - Risk of:  Goal: Will remain free from falls  Description  Will remain free from falls  5/21/2019 0018 by Johny Henning RN  Outcome: Ongoing  Note:   Fall risk assessment completed . Fall precautions in place, bed/ chair alarm on, side rails 2/4 up, call light in reach, educated pt on calling for assistance when needed, room clear of clutter. Pt verbalized understanding.    5/20/2019 1738 by oLbo Gonzales RN  Outcome: Ongoing  Note:   Patient educated on fall prevention. Call light is within reach, bed locked in lowest position, personal items within reach, and bed alarm is on. Will round on patient per unit guidelines. Goal: Absence of physical injury  Description  Absence of physical injury  5/21/2019 0018 by Johny Henning RN  Outcome: Ongoing  Note:   Pt is free of injury. No injury noted. Fall precautions in place. Call light within reach. Will monitor. 5/20/2019 1738 by Lobo Gonzales RN  Outcome: Ongoing  Note:   Pt assessed for fall risk and fall precautions put into place. Bed in lowest position and wheels locked, call light within reach. Nonskid footwear in place. Patient educated on appropriate method of transfer and to call for assistance. Problem: Risk for Impaired Skin Integrity  Goal: Tissue integrity - skin and mucous membranes  Description  Structural intactness and normal physiological function of skin and  mucous membranes. 5/21/2019 0018 by Johny Henning RN  Outcome: Ongoing  Note:   Skin assessment completed every shift. Pt assessed for incontinence, appropriate barrier cream applied prn. Pt encouraged to turn/rotate every 2 hours. Assistance provided if pt unable to do so themselves. 5/20/2019 1738 by Lobo Gonzales RN  Outcome: Ongoing  Note:   Will monitor skin and mucous members. Will turn patient every 2 hours, monitor for friction and sheering, and change dressings as needed. Will preform skin assessment every shift. Problem: Nutrition  Goal: Optimal nutrition therapy  2019 by Gilberto Oseguera RN  Outcome: Ongoing  Note:   Patient educated on proper nutrition. Patients intake monitored and patient assessed to make sure no assistance with eating was required. Patient encouraged to eat a well balanced diet. 2019 by Yoel Ruth RN  Outcome: Ongoing  Note:   Educated patient on importance of proper nutrition. Intake is being recorded to ensure optimal nutrition. Will consult dietitian as needed. Problem: Bowel/Gastric:  Goal: Occurrences of diarrhea will decrease  Description  Occurrences of diarrhea will decrease  2019 by Gilberto Oseguera RN  Outcome: Ongoing  Note:   No diarrhea so far this shift  2019 by Yoel Ruth RN  Outcome: Ongoing  Note:   Patient will report decreased amount of diarrhea     Problem: Fluid Volume:  Goal: Will show no signs and symptoms of electrolyte imbalance  Description  Will show no signs and symptoms of electrolyte imbalance  2019 by Gilberto Oseguera RN  Outcome: Ongoing  Note:   Patient monitored for fluid and electrolyte imbalance throughout the day by use of daily labs. Patient encouraged to drink fluids and eat a well balanced . Abnormal lab results will be reported to physician, per orders. Will continue to monitor. 2019 by Yoel Ruth RN  Note:   Vitals signs are stable. Intake and output are being recorded, will continue to monitor       Problem: Physical Regulation:  Goal: Prevent transmision of infection  Description  Prevent transmision of infection  2019 by Gilberto Oseguera RN  Outcome: Ongoing  Note:   Remains in isolation  2019 by Yoel Ruth RN  Outcome: Ongoing  Note:   Patient will be able to state how infection is spread and patient will follow steps to avoid transmission.    Goal: Ability to avoid or minimize complications of infection will improve  Description  Ability to avoid or minimize complications of infection will improve  Outcome: Ongoing  Goal: Signs and symptoms of infection will decrease  Description  Signs and symptoms of infection will decrease  Outcome: Ongoing     Problem: Skin Integrity:  Goal: Risk for impaired skin integrity will decrease  Description  Risk for impaired skin integrity will decrease  5/21/2019 0018 by Tomas Martinez RN  Outcome: Ongoing  Note:   Skin assessment complete. No new signs of skin breakdown noted. Assistance provided with repositioning while in bed.   5/20/2019 1738 by Milana Comer RN  Outcome: Ongoing  Note:   Will monitor skin and mucous members. Will turn patient every 2 hours, monitor for friction and sheering, and change dressings as needed. Will preform skin assessment every shift. Problem: Pain:  Goal: Pain level will decrease  Description  Pain level will decrease  5/21/2019 0018 by Tomas Martinez RN  Outcome: Ongoing  Note:   Pain/discomfort being managed with PRN analgesics per MD orders. Pt able to express presence and absence of pain and rate pain appropriately using numerical scale. 5/20/2019 1738 by Milana Comer RN  Outcome: Ongoing  Note:   Educated patient on pain management. Will assess patients pain level per unit protocol, and provide pain management measures as needed. Goal: Control of acute pain  Description  Control of acute pain  5/21/2019 0018 by Tomas Martinez RN  Outcome: Ongoing  Note:   Patient educated on acute pain. Taught patient to use call light to ask for pain medication. PRN pain medication given for acute pain. Will continue to monitor pain per unit protocol. 5/20/2019 1738 by Milana Comer RN  Outcome: Ongoing  Note:   Patient educated on acute pain. Taught patient to use call light to ask for pain medication. PRN pain medication given for acute pain. Will continue to monitor pain per unit protocol.      Goal: Control of chronic pain  Description  Control of chronic pain  5/21/2019 0018 by Mich Thacker RN  Outcome: Ongoing  Note:   Patient educated on chronic pain. Taught patient to use call light to ask for pain medication. PRN pain medication given for chronic pain. Will continue to monitor pain per unit protocol. 5/20/2019 1738 by Ruddy Foote RN  Outcome: Ongoing  Note:   Patient educated on chronic pain. Taught patient to use call light to ask for pain medication. Will continue to monitor pain per unit protocol.

## 2019-05-22 LAB
ANION GAP SERPL CALCULATED.3IONS-SCNC: 10 MMOL/L (ref 3–16)
ATYPICAL LYMPHOCYTE RELATIVE PERCENT: 1 % (ref 0–6)
BANDED NEUTROPHILS RELATIVE PERCENT: 11 % (ref 0–7)
BASOPHILS ABSOLUTE: 0 K/UL (ref 0–0.2)
BASOPHILS RELATIVE PERCENT: 0 %
BUN BLDV-MCNC: 41 MG/DL (ref 7–20)
CALCIUM SERPL-MCNC: 7.8 MG/DL (ref 8.3–10.6)
CHLORIDE BLD-SCNC: 109 MMOL/L (ref 99–110)
CO2: 19 MMOL/L (ref 21–32)
CREAT SERPL-MCNC: 1.3 MG/DL (ref 0.6–1.2)
DOHLE BODIES: PRESENT
EOSINOPHILS ABSOLUTE: 1.3 K/UL (ref 0–0.6)
EOSINOPHILS RELATIVE PERCENT: 4 %
GFR AFRICAN AMERICAN: 47
GFR NON-AFRICAN AMERICAN: 39
GLUCOSE BLD-MCNC: 91 MG/DL (ref 70–99)
HCT VFR BLD CALC: 41.7 % (ref 36–48)
HEMOGLOBIN: 13.5 G/DL (ref 12–16)
LYMPHOCYTES ABSOLUTE: 4.3 K/UL (ref 1–5.1)
LYMPHOCYTES RELATIVE PERCENT: 12 %
MAGNESIUM: 2.1 MG/DL (ref 1.8–2.4)
MCH RBC QN AUTO: 31.1 PG (ref 26–34)
MCHC RBC AUTO-ENTMCNC: 32.4 G/DL (ref 31–36)
MCV RBC AUTO: 96.2 FL (ref 80–100)
MONOCYTES ABSOLUTE: 2.7 K/UL (ref 0–1.3)
MONOCYTES RELATIVE PERCENT: 8 %
NEUTROPHILS ABSOLUTE: 24.9 K/UL (ref 1.7–7.7)
NEUTROPHILS RELATIVE PERCENT: 64 %
PDW BLD-RTO: 14.8 % (ref 12.4–15.4)
PLATELET # BLD: 290 K/UL (ref 135–450)
PMV BLD AUTO: 8.5 FL (ref 5–10.5)
POTASSIUM SERPL-SCNC: 3.3 MMOL/L (ref 3.5–5.1)
RBC # BLD: 4.33 M/UL (ref 4–5.2)
SODIUM BLD-SCNC: 138 MMOL/L (ref 136–145)
TOXIC GRANULATION: PRESENT
VACUOLATED NEUTROPHILS: PRESENT
WBC # BLD: 33.2 K/UL (ref 4–11)

## 2019-05-22 PROCEDURE — 97535 SELF CARE MNGMENT TRAINING: CPT

## 2019-05-22 PROCEDURE — 36415 COLL VENOUS BLD VENIPUNCTURE: CPT

## 2019-05-22 PROCEDURE — APPNB30 APP NON BILLABLE TIME 0-30 MINS: Performed by: NURSE PRACTITIONER

## 2019-05-22 PROCEDURE — APPSS15 APP SPLIT SHARED TIME 0-15 MINUTES: Performed by: NURSE PRACTITIONER

## 2019-05-22 PROCEDURE — 83735 ASSAY OF MAGNESIUM: CPT

## 2019-05-22 PROCEDURE — 6370000000 HC RX 637 (ALT 250 FOR IP): Performed by: NURSE PRACTITIONER

## 2019-05-22 PROCEDURE — 2580000003 HC RX 258: Performed by: INTERNAL MEDICINE

## 2019-05-22 PROCEDURE — 6370000000 HC RX 637 (ALT 250 FOR IP): Performed by: SURGERY

## 2019-05-22 PROCEDURE — 1200000000 HC SEMI PRIVATE

## 2019-05-22 PROCEDURE — 99232 SBSQ HOSP IP/OBS MODERATE 35: CPT | Performed by: SURGERY

## 2019-05-22 PROCEDURE — 6370000000 HC RX 637 (ALT 250 FOR IP): Performed by: FAMILY MEDICINE

## 2019-05-22 PROCEDURE — 2580000003 HC RX 258: Performed by: NURSE PRACTITIONER

## 2019-05-22 PROCEDURE — 6370000000 HC RX 637 (ALT 250 FOR IP): Performed by: INTERNAL MEDICINE

## 2019-05-22 PROCEDURE — 80048 BASIC METABOLIC PNL TOTAL CA: CPT

## 2019-05-22 PROCEDURE — 6360000002 HC RX W HCPCS: Performed by: NURSE PRACTITIONER

## 2019-05-22 PROCEDURE — 2500000003 HC RX 250 WO HCPCS: Performed by: INTERNAL MEDICINE

## 2019-05-22 PROCEDURE — 6360000002 HC RX W HCPCS: Performed by: INTERNAL MEDICINE

## 2019-05-22 PROCEDURE — 97530 THERAPEUTIC ACTIVITIES: CPT

## 2019-05-22 PROCEDURE — 6370000000 HC RX 637 (ALT 250 FOR IP)

## 2019-05-22 PROCEDURE — 85025 COMPLETE CBC W/AUTO DIFF WBC: CPT

## 2019-05-22 RX ORDER — HYDROCODONE BITARTRATE AND ACETAMINOPHEN 10; 325 MG/1; MG/1
1 TABLET ORAL EVERY 6 HOURS PRN
Status: DISCONTINUED | OUTPATIENT
Start: 2019-05-22 | End: 2019-05-24

## 2019-05-22 RX ORDER — LIDOCAINE 4 G/G
1 PATCH TOPICAL DAILY
Status: DISCONTINUED | OUTPATIENT
Start: 2019-05-22 | End: 2019-05-27 | Stop reason: HOSPADM

## 2019-05-22 RX ORDER — POTASSIUM CHLORIDE 20 MEQ/1
20 TABLET, EXTENDED RELEASE ORAL 2 TIMES DAILY WITH MEALS
Status: DISCONTINUED | OUTPATIENT
Start: 2019-05-22 | End: 2019-05-22

## 2019-05-22 RX ORDER — ACETAMINOPHEN 325 MG/1
650 TABLET ORAL EVERY 4 HOURS PRN
Status: DISCONTINUED | OUTPATIENT
Start: 2019-05-22 | End: 2019-05-27 | Stop reason: HOSPADM

## 2019-05-22 RX ADMIN — VANCOMYCIN HYDROCHLORIDE 500 MG: 1 INJECTION, POWDER, LYOPHILIZED, FOR SOLUTION INTRAVENOUS at 18:26

## 2019-05-22 RX ADMIN — HYDROCODONE BITARTRATE AND ACETAMINOPHEN 1 TABLET: 5; 325 TABLET ORAL at 03:29

## 2019-05-22 RX ADMIN — FIDAXOMICIN 200 MG: 200 TABLET, FILM COATED ORAL at 10:38

## 2019-05-22 RX ADMIN — METRONIDAZOLE 500 MG: 500 INJECTION, SOLUTION INTRAVENOUS at 00:50

## 2019-05-22 RX ADMIN — VANCOMYCIN HYDROCHLORIDE 250 MG: 250 CAPSULE ORAL at 09:16

## 2019-05-22 RX ADMIN — VANCOMYCIN HYDROCHLORIDE 250 MG: 250 CAPSULE ORAL at 17:36

## 2019-05-22 RX ADMIN — Medication 10 ML: at 21:56

## 2019-05-22 RX ADMIN — HYDROCODONE BITARTRATE AND ACETAMINOPHEN 1 TABLET: 10; 325 TABLET ORAL at 15:29

## 2019-05-22 RX ADMIN — HYDROCODONE BITARTRATE AND ACETAMINOPHEN 1 TABLET: 10; 325 TABLET ORAL at 21:54

## 2019-05-22 RX ADMIN — Medication 1 CAPSULE: at 17:36

## 2019-05-22 RX ADMIN — HEPARIN SODIUM 5000 UNITS: 5000 INJECTION INTRAVENOUS; SUBCUTANEOUS at 21:55

## 2019-05-22 RX ADMIN — Medication 1 CAPSULE: at 10:38

## 2019-05-22 RX ADMIN — VANCOMYCIN HYDROCHLORIDE 250 MG: 250 CAPSULE ORAL at 15:21

## 2019-05-22 RX ADMIN — TRAZODONE HYDROCHLORIDE 50 MG: 50 TABLET ORAL at 21:54

## 2019-05-22 RX ADMIN — HYDROCODONE BITARTRATE AND ACETAMINOPHEN 1 TABLET: 10; 325 TABLET ORAL at 09:16

## 2019-05-22 RX ADMIN — VANCOMYCIN HYDROCHLORIDE 500 MG: 1 INJECTION, POWDER, LYOPHILIZED, FOR SOLUTION INTRAVENOUS at 00:00

## 2019-05-22 RX ADMIN — METRONIDAZOLE 500 MG: 500 INJECTION, SOLUTION INTRAVENOUS at 17:36

## 2019-05-22 RX ADMIN — VANCOMYCIN HYDROCHLORIDE 250 MG: 250 CAPSULE ORAL at 21:54

## 2019-05-22 RX ADMIN — HEPARIN SODIUM 5000 UNITS: 5000 INJECTION INTRAVENOUS; SUBCUTANEOUS at 05:39

## 2019-05-22 RX ADMIN — HEPARIN SODIUM 5000 UNITS: 5000 INJECTION INTRAVENOUS; SUBCUTANEOUS at 15:20

## 2019-05-22 RX ADMIN — POTASSIUM BICARBONATE 20 MEQ: 782 TABLET, EFFERVESCENT ORAL at 15:20

## 2019-05-22 RX ADMIN — Medication 10 ML: at 10:39

## 2019-05-22 RX ADMIN — FIDAXOMICIN 200 MG: 200 TABLET, FILM COATED ORAL at 21:55

## 2019-05-22 RX ADMIN — METRONIDAZOLE 500 MG: 500 INJECTION, SOLUTION INTRAVENOUS at 09:16

## 2019-05-22 RX ADMIN — SOTALOL HYDROCHLORIDE 40 MG: 80 TABLET ORAL at 09:16

## 2019-05-22 RX ADMIN — VENLAFAXINE HYDROCHLORIDE 75 MG: 75 CAPSULE, EXTENDED RELEASE ORAL at 21:54

## 2019-05-22 ASSESSMENT — PAIN DESCRIPTION - PAIN TYPE
TYPE: CHRONIC PAIN

## 2019-05-22 ASSESSMENT — PAIN DESCRIPTION - ONSET
ONSET: ON-GOING

## 2019-05-22 ASSESSMENT — PAIN - FUNCTIONAL ASSESSMENT
PAIN_FUNCTIONAL_ASSESSMENT: ACTIVITIES ARE NOT PREVENTED
PAIN_FUNCTIONAL_ASSESSMENT: ACTIVITIES ARE NOT PREVENTED
PAIN_FUNCTIONAL_ASSESSMENT: PREVENTS OR INTERFERES SOME ACTIVE ACTIVITIES AND ADLS

## 2019-05-22 ASSESSMENT — PAIN DESCRIPTION - ORIENTATION
ORIENTATION: MID
ORIENTATION: MID
ORIENTATION: RIGHT;MID
ORIENTATION: RIGHT;MID
ORIENTATION: MID

## 2019-05-22 ASSESSMENT — PAIN DESCRIPTION - PROGRESSION
CLINICAL_PROGRESSION: GRADUALLY IMPROVING
CLINICAL_PROGRESSION: GRADUALLY IMPROVING
CLINICAL_PROGRESSION: NOT CHANGED
CLINICAL_PROGRESSION: GRADUALLY IMPROVING
CLINICAL_PROGRESSION: GRADUALLY IMPROVING

## 2019-05-22 ASSESSMENT — PAIN DESCRIPTION - DESCRIPTORS
DESCRIPTORS: ACHING;CRAMPING
DESCRIPTORS: ACHING
DESCRIPTORS: ACHING;CRAMPING
DESCRIPTORS: ACHING
DESCRIPTORS: ACHING;CRAMPING

## 2019-05-22 ASSESSMENT — PAIN DESCRIPTION - LOCATION
LOCATION: BACK

## 2019-05-22 ASSESSMENT — PAIN DESCRIPTION - FREQUENCY
FREQUENCY: CONTINUOUS
FREQUENCY: INTERMITTENT

## 2019-05-22 ASSESSMENT — PAIN SCALES - GENERAL
PAINLEVEL_OUTOF10: 3
PAINLEVEL_OUTOF10: 8
PAINLEVEL_OUTOF10: 10
PAINLEVEL_OUTOF10: 9
PAINLEVEL_OUTOF10: 10
PAINLEVEL_OUTOF10: 0
PAINLEVEL_OUTOF10: 8
PAINLEVEL_OUTOF10: 2

## 2019-05-22 NOTE — PROGRESS NOTES
[facility] and Vascular Surgery                                     Daily Progress Note                                                         Pt Name: Austen Green  Medical Record Number: [id number]  Date of Birth 5/6/1930   Today's Date: 5/22/2019  Chief Complaint   Patient presents with    Fatigue     x 3 days    Diarrhea        ASSESSMENT/PLAN  C diff colitis  -Unchanged abdominal tenderness and diarrhea  -continue oral vanc, IV flagyl, dificid, vanc enemas.   -Discussed the surgical option if she deteriorates, being a subtotal colectomy with end ileostomy. She is adamant that she does not surgery at all, and that she would prefer to die rather than have surgery. Patient states that her brother had an ostomy and she does not want that. Will follow and provide assistance. Discharge Planning: per primary team    EDUCATION  Patient educated about Disease Process, Medications, Smoking Cessation, Oxygenation, Incentive Spirometry and Deep Breath and Cough, Diabetes, Hyperlipidemia, Smoking Cessation, Nutrition, Exercise and Hypertension    SUBJECTIVE  Matilde Hunting is unchanged from yesterday. Pain is not well controlled. She has no nausea and no vomiting. She has passed flatus and has had a bowel movement. She is tolerating ice chips. Current activity is up with assistance    OBJECTIVE  VITALS:  height is 4' 9\" (1.448 m) and weight is 141 lb 12.1 oz (64.3 kg). Her oral temperature is 98.4 °F (36.9 °C). Her blood pressure is 118/67 and her pulse is 87. Her respiration is 18 and oxygen saturation is 96%.    INTAKE/OUTPUT:    Intake/Output Summary (Last 24 hours) at 5/22/2019 0957  Last data filed at 5/21/2019 2251  Gross per 24 hour   Intake 240 ml   Output --   Net 240 ml     GENERAL: alert, no distress  LUNGS: clear to ausculation, without wheezes, rales or rhonci  HEART: normal rate and regular rhythm  ABDOMEN: soft, generalized tenderness, not peritoneal. No hernias or masses. EXTREMITY: no cyanosis, clubbing or edema  I/O last 3 completed shifts: In: 240 [P.O.:240]  Out: -   No intake/output data recorded. LABS  Recent Labs     05/22/19  0718   WBC 33.2*   HGB 13.5   HCT 41.7         K 3.3*      CO2 19*   BUN 41*   CREATININE 1.3*   MG 2.10   CALCIUM 7.8*         Electronically signed by CHERELLE Orellana CNP on 5/22/19 at 9:47 [address] [facility] [address] and Vascular Surgery   [phone] Office  [phone] Cell     Agree with above note. The patient was personally seen and examined. Tess Orlando is doing ok. Feeling about the same as yesterday. Less diarrhea. Still having some abdominal pain. Abd soft, minimally distended, minimal bilateral abdominal tenderness without peritonitis    WBC down to 33 from 38  Cr down to 1.3 from 1.4    A/P: c diff colitis    Leukocytosis and exam slightly improved.   Continue oral/IV/rectal antibiotics  OK to continue clears  Probiotics  PT/OT    Layne Arevalo MD

## 2019-05-22 NOTE — PROGRESS NOTES
Occupational Therapy  Facility/Department: [address] ORTHOPEDICS  Daily Treatment Note  NAME: Austen Green  : 1930  MRN: [id number]    Date of Service: 2019    Discharge Recommendations:  Patient would benefit from continued therapy after discharge, 3-5 sessions per week    Austen Green scored a 13/24 on the AM-PAC ADL Inpatient form. Current research shows that an AM-PAC score of 17 or less is typically not associated with a discharge to the patient's home setting. Based on the patients AM-PAC score and their current ADL deficits, it is recommended that the patient have 3-5 sessions per week of Occupational Therapy at d/c to increase the patients independence. Assessment   Performance deficits / Impairments: Decreased functional mobility ; Decreased ADL status; Decreased strength;Decreased safe awareness;Decreased cognition;Decreased endurance;Decreased balance;Decreased high-level IADLs  Assessment: Discussed with OTR:  pt tolerated session fair, limited by decreased activity tolerance, endurance and pain. Pt requires up to Max/dep for ADL's, Min/CGA cues for transfers and Min/Mod A x1 for fx'l mob with RW and cues for safety. Pt will benefit from cont OT at d/c prior to returning home. Prognosis: Good;Fair  Patient Education: Role of OT; ADL's; safety; orientation info  REQUIRES OT FOLLOW UP: Yes  Activity Tolerance  Activity Tolerance: Patient limited by pain; Patient limited by fatigue  Activity Tolerance: c/o pain throughout tx. Safety Devices  Safety Devices in place: Hedy Saldivar aware)  Type of devices: Call light within reach;Gait belt;Left in chair;Nurse notified; Chair alarm in place         Patient Diagnosis(es): [facility] Diagnoses of Acute kidney injury ([address] Dehydration, and Hyperkalemia were also pertinent to this visit.       has a past medical history of Atrial fibrillation ([address] Carpal tunnel syndrome, bilateral, Cataract, CHF (congestive heart failure) ([facility] Clostridium difficile diarrhea, Hyperlipidemia, Hypertension, and MI (myocardial infarction) ([facility] [address] [facility] has a past surgical history that includes Total knee arthroplasty (Bilateral); Spine surgery; Carpal tunnel release; eye surgery; Cataract removal; and other surgical history (07/19/2017). Restrictions  Restrictions/Precautions  Restrictions/Precautions: Fall Risk  Position Activity Restriction  Other position/activity restrictions: c-diff contact precautions  Subjective   General  Chart Reviewed: Yes  Patient assessed for rehabilitation services?: Yes  Additional Pertinent Hx: Pt is an 79 yo female admitted with fatigue and diarrhea. She was recently on Abx for a UTI. CT abdomen showed pancolitis. Response to previous treatment: Patient reporting fatigue but able to participate(with encouragement)  Family / Caregiver Present: No  Referring Practitioner: Meg Estrada  Diagnosis: c diff pancolitis, sepsis, SHANTEL  Subjective  Subjective: Pt met BS, in BR, on commode (meghana cedeno in front of pt). Pt agreeable to OT. Pt c/o abd and buttock pain (c/o constipation), pain noted rated. Orientation  Orientation  Overall Orientation Status: Impaired  Orientation Level: Oriented to person;Oriented to place; Disoriented to time;Disoriented to situation(sated correct month and \"21st\")  Objective    ADL  Grooming: Setup;Stand by assistance(seated to wash face, hands, and brush teeth with set up of paste on brush (seated away from sink))  UE Bathing: Setup;Verbal cueing; Increased time to complete;Minimal assistance(cues, assist to reach all areas, incl drying)  LE Bathing: Maximum assistance  UE Dressing: (gown changed with Mod A)  LE Dressing: Maximum assistance;Dependent/Total(to don briefs, footies. Pt unable to cross LE's. Assists to manage briefs over hips)  Toileting: (pt on commode when JUSTUS arrived to room. Pt c/o constipation.  Pt had already voided small BM, and requesting assist to [employer] Short term goal 1: Pt will bathe with min A. Short term goal 2: Pt will dress with min A. Short term goal 3: Pt will toilet with min A. Short term goal 4: Pt will complete fxl mobility and fxl transfers to/from ADL surfaces with SBA using AD. Short term goal 5: Pt will complete grooming at sink with SBA. Short term goal 6: Pt will increase activity tolerance to achieve the above goals. Long term goals  Time Frame for Long term goals : STG=LTG  Patient Goals   Patient goals : to return home.         Therapy Time   Individual Concurrent Group Co-treatment   Time In 0945         Time Out 1025         Minutes 40              Leyla LUBIN/L,515  This note to serve as discharge summary if pt d/c'd prior to next session

## 2019-05-22 NOTE — PROGRESS NOTES
Patient tolerated the enema well as ordered. Iv AB'S infusing as ordered. Will continue to monitor and assess.

## 2019-05-22 NOTE — PROGRESS NOTES
[facility] Medicine Progress Note      Admit Date: 5/16/2019         Overnight Events: No    CC: F/U for cdiff pancolitis    HPI: The patient is an 81 yo female, with a pmh of chronic systolic CHF, chronic atrial fibrillation, and CAD, admitted with fatigue and diarrhea. The patient noted several days of diarrhea prior to admission, with associated abd pain. She had recently been on Abx for a UTI. In the ER, a CT a/p showed pancolitis. The patient was found to be C diff positive. She was admitted for sepsis 2/2 c.diff pancolitis. IV flagyl and PO vancomycin were initiated. Infectious disease was consulted. Fidaxomicin was added. WBC continued to trend upward. General surgery was consulted due to the severity of symptoms. Vancomycin enemas were added to medical regimen. The patient made it very clear that she did not want surgery and was focused on quality of life. This was discussed with both the patient and her son. Code status was changed to [facility]. Palliative care was consulted. Interval History/Subjective: No new complaints. She notes no further diarrhea. She has continued back pain (chronic) as well as abd discomfort. Review of Systems:     _____________________________________________________________________  General ROS:  [x] N/A [] Other:  _____________________________________________________________________  HEENT ROS:  [x] N/A [] Other:  _____________________________________________________________________  Respiratory ROS:  [] N/A [x] Other: SOB slightly improved. _____________________________________________________________________  Cardiovascular ROS:  [x] N/A [] Other:  _____________________________________________________________________   Gastrointestinal ROS:  [] N/A [x] Other: No further diarrhea - per the patient.  Continued distention and discomfort.   _____________________________________________________________________  Genitourinary:  [x] N/A [] Other:  _____________________________________________________________________  Musculoskeletal ROS:  [] N/A [x] Other: Chronic back pain  _____________________________________________________________________  Endocrine ROS:  [x] N/A [] Other:  _____________________________________________________________________  Neurological ROS:  [x] N/A [] Other:  _____________________________________________________________________  Psych ROS:  [x] N/A [] Other:  _____________________________________________________________________  Dermatological ROS:  [x] N/A [] Other:  _____________________________________________________________________    Comprehensive ROS negative except as mentioned above. Past Medical History:        Diagnosis Date    Atrial fibrillation (HCC)     Carpal tunnel syndrome, bilateral     Cataract     CHF (congestive heart failure) (HCC)     Clostridium difficile diarrhea 05/16/2019    Hyperlipidemia     Hypertension     MI (myocardial infarction) ([facility] [address] [facility]        Past Surgical History:        Procedure Laterality Date    CARPAL TUNNEL RELEASE      CATARACT REMOVAL      EYE SURGERY      OTHER SURGICAL HISTORY  07/19/2017    Watchman procedure    SPINE SURGERY      TOTAL KNEE ARTHROPLASTY Bilateral        Allergies:  Elena advanced aspirin ex st [aspirin]    Past medical and surgical history reviewed. Any changes have been noted. Scheduled and prn Medications:    Scheduled Meds:   lactobacillus  1 capsule Oral TID WC    vancomycin 500 mg in 0.9% sodium chloride 250 mL  500 mg Rectal Q6H    Fidaxomicin  200 mg Oral BID    sotalol  40 mg Oral Daily    traZODone  50 mg Oral Nightly    venlafaxine  75 mg Oral Nightly    sodium chloride flush  10 mL Intravenous 2 times per day    metroNIDAZOLE  500 mg Intravenous Q8H    vancomycin  250 mg Oral 4x Daily    heparin (porcine)  5,000 Units Subcutaneous 3 times per day     Continuous Infusions:    PRN Meds:. HYDROcodone-acetaminophen, acetaminophen, tiZANidine, sodium chloride flush, ondansetron    PHYSICAL EXAM:  /89   Pulse 64   Temp 97.4 °F (36.3 °C) (Oral)   Resp 16   Ht 4' 9\" (1.448 m)   Wt 141 lb 12.1 oz (64.3 kg)   SpO2 90%   BMI 30.68 kg/m²       Intake/Output Summary (Last 24 hours) at 5/22/2019 0837  Last data filed at 5/21/2019 2251  Gross per 24 hour   Intake 240 ml   Output --   Net 240 ml       General: Alert and oriented. Sitting up at bedside in NAD. Pleasant and cooperative. Son at bedside. HEENT: Normocephalic. Atraumatic. Pupils equal and reactive. EOM intact. Oral mucosa pink/moist/intact. Neck: Supple. Symmetrical. Trachea midline. Lungs: Clear to auscultation bilaterally. Respirations even and unlabored. Chest: Exam unremarkable. Cardiac: S1/S2 noted. Regular Rhythm and rate. Abdomen/GI: Soft. Mildly tender t/o. Distended. BS+. Extremities: PP+. Atraumatic. No redness/cyanosis/edema noted. Brisk cap refill. Skin: Dry and intact. No lesions noted. Neuro: Grossly intact. No focal deficits noted. LABS:    Lab Results   Component Value Date    WBC 33.2 (H) 05/22/2019    HGB 13.5 05/22/2019    HCT 41.7 05/22/2019    MCV 96.2 05/22/2019     05/22/2019    LYMPHOPCT 5.0 05/21/2019    RBC 4.33 05/22/2019    MCH 31.1 05/22/2019    MCHC 32.4 05/22/2019    RDW 14.8 05/22/2019       Lab Results   Component Value Date    CREATININE 1.3 (H) 05/22/2019    BUN 41 (H) 05/22/2019     05/22/2019    K 3.3 (L) 05/22/2019     05/22/2019    CO2 19 (L) 05/22/2019       Lab Results   Component Value Date    MG 2.10 05/22/2019       Lab Results   Component Value Date    ALT 13 05/16/2019    AST 19 05/16/2019    ALKPHOS 106 05/16/2019    BILITOT 0.3 05/16/2019        No flowsheet data found. Imaging:  XR CHEST STANDARD (2 VW)   Final Result   No acute cardiopulmonary abnormality is identified. Cardiac silhouette is enlarged, similar to prior study.          CT ABDOMEN PELVIS WO CONTRAST   Final DIETITIAN  IP CONSULT TO INFECTIOUS DISEASES  IP CONSULT TO GENERAL SURGERY    Disposition:  [] Home  [] Home with home health [] Rehab [] Psych [] SNF  [] LTAC  [] Long term nursing home or group home [] Transfer to ICU  [] Transfer to PCU [x] Other: TBD    Code Status: DNR-CC    ELOS: TBCHERELLE Summers NP  05/22/19

## 2019-05-22 NOTE — PROGRESS NOTES
Pt is very weak and short of breathe at rest complains of severe lower back and abdominal pain. She states that her stool is now formed and difficult to come out.

## 2019-05-22 NOTE — PROGRESS NOTES
Patient alert and oriented. Patient on a clear liquid diet. V/S stable. Bed alarm on. Call  Light within reach. Evening medications tolerated well. Will continue to assess and monitor.

## 2019-05-22 NOTE — PLAN OF CARE
Fall risk assessment completed. Fall precautions in place. Call light within reach. Pt educated on calling for assistance before getting up. Walkway free of clutter. Will continue to monitor. Will monitor skin and mucous members. Will turn patient every 2 hours, monitor for friction and sheering, and change dressings as needed. Will preform skin assessment every shift.

## 2019-05-23 ENCOUNTER — APPOINTMENT (OUTPATIENT)
Dept: GENERAL RADIOLOGY | Age: 84
DRG: 872 | End: 2019-05-23
Payer: MEDICARE

## 2019-05-23 LAB
ANION GAP SERPL CALCULATED.3IONS-SCNC: 11 MMOL/L (ref 3–16)
ANISOCYTOSIS: ABNORMAL
BANDED NEUTROPHILS RELATIVE PERCENT: 5 % (ref 0–7)
BASOPHILS ABSOLUTE: 0 K/UL (ref 0–0.2)
BASOPHILS RELATIVE PERCENT: 0 %
BUN BLDV-MCNC: 40 MG/DL (ref 7–20)
CALCIUM SERPL-MCNC: 7.8 MG/DL (ref 8.3–10.6)
CHLORIDE BLD-SCNC: 108 MMOL/L (ref 99–110)
CO2: 19 MMOL/L (ref 21–32)
CREAT SERPL-MCNC: 1.1 MG/DL (ref 0.6–1.2)
EOSINOPHILS ABSOLUTE: 0 K/UL (ref 0–0.6)
EOSINOPHILS RELATIVE PERCENT: 0 %
GFR AFRICAN AMERICAN: 57
GFR NON-AFRICAN AMERICAN: 47
GLUCOSE BLD-MCNC: 88 MG/DL (ref 70–99)
HCT VFR BLD CALC: 41.1 % (ref 36–48)
HEMOGLOBIN: 13.5 G/DL (ref 12–16)
LYMPHOCYTES ABSOLUTE: 0.6 K/UL (ref 1–5.1)
LYMPHOCYTES RELATIVE PERCENT: 2 %
MAGNESIUM: 2.1 MG/DL (ref 1.8–2.4)
MCH RBC QN AUTO: 31.6 PG (ref 26–34)
MCHC RBC AUTO-ENTMCNC: 32.9 G/DL (ref 31–36)
MCV RBC AUTO: 96.2 FL (ref 80–100)
METAMYELOCYTES RELATIVE PERCENT: 1 %
MONOCYTES ABSOLUTE: 1.1 K/UL (ref 0–1.3)
MONOCYTES RELATIVE PERCENT: 4 %
NEUTROPHILS ABSOLUTE: 26.9 K/UL (ref 1.7–7.7)
NEUTROPHILS RELATIVE PERCENT: 88 %
PDW BLD-RTO: 15 % (ref 12.4–15.4)
PLATELET # BLD: 278 K/UL (ref 135–450)
PLATELET SLIDE REVIEW: ADEQUATE
PMV BLD AUTO: 8.4 FL (ref 5–10.5)
POTASSIUM SERPL-SCNC: 3.4 MMOL/L (ref 3.5–5.1)
RBC # BLD: 4.28 M/UL (ref 4–5.2)
SLIDE REVIEW: ABNORMAL
SODIUM BLD-SCNC: 138 MMOL/L (ref 136–145)
VACUOLATED NEUTROPHILS: PRESENT
WBC # BLD: 28.6 K/UL (ref 4–11)

## 2019-05-23 PROCEDURE — 2580000003 HC RX 258: Performed by: NURSE PRACTITIONER

## 2019-05-23 PROCEDURE — 2580000003 HC RX 258: Performed by: INTERNAL MEDICINE

## 2019-05-23 PROCEDURE — 6360000002 HC RX W HCPCS: Performed by: INTERNAL MEDICINE

## 2019-05-23 PROCEDURE — 85025 COMPLETE CBC W/AUTO DIFF WBC: CPT

## 2019-05-23 PROCEDURE — 2500000003 HC RX 250 WO HCPCS: Performed by: INTERNAL MEDICINE

## 2019-05-23 PROCEDURE — 36415 COLL VENOUS BLD VENIPUNCTURE: CPT

## 2019-05-23 PROCEDURE — 6370000000 HC RX 637 (ALT 250 FOR IP): Performed by: INTERNAL MEDICINE

## 2019-05-23 PROCEDURE — 94760 N-INVAS EAR/PLS OXIMETRY 1: CPT

## 2019-05-23 PROCEDURE — 99232 SBSQ HOSP IP/OBS MODERATE 35: CPT | Performed by: SURGERY

## 2019-05-23 PROCEDURE — 83735 ASSAY OF MAGNESIUM: CPT

## 2019-05-23 PROCEDURE — 74019 RADEX ABDOMEN 2 VIEWS: CPT

## 2019-05-23 PROCEDURE — 6370000000 HC RX 637 (ALT 250 FOR IP): Performed by: NURSE PRACTITIONER

## 2019-05-23 PROCEDURE — 94664 DEMO&/EVAL PT USE INHALER: CPT

## 2019-05-23 PROCEDURE — 99232 SBSQ HOSP IP/OBS MODERATE 35: CPT | Performed by: INTERNAL MEDICINE

## 2019-05-23 PROCEDURE — 1200000000 HC SEMI PRIVATE

## 2019-05-23 PROCEDURE — 6360000002 HC RX W HCPCS: Performed by: NURSE PRACTITIONER

## 2019-05-23 PROCEDURE — 6370000000 HC RX 637 (ALT 250 FOR IP)

## 2019-05-23 PROCEDURE — 80048 BASIC METABOLIC PNL TOTAL CA: CPT

## 2019-05-23 PROCEDURE — 6370000000 HC RX 637 (ALT 250 FOR IP): Performed by: SURGERY

## 2019-05-23 RX ADMIN — HEPARIN SODIUM 5000 UNITS: 5000 INJECTION INTRAVENOUS; SUBCUTANEOUS at 14:27

## 2019-05-23 RX ADMIN — VANCOMYCIN HYDROCHLORIDE 250 MG: 250 CAPSULE ORAL at 14:27

## 2019-05-23 RX ADMIN — FIDAXOMICIN 200 MG: 200 TABLET, FILM COATED ORAL at 08:37

## 2019-05-23 RX ADMIN — VANCOMYCIN HYDROCHLORIDE 250 MG: 250 CAPSULE ORAL at 08:28

## 2019-05-23 RX ADMIN — METRONIDAZOLE 500 MG: 500 INJECTION, SOLUTION INTRAVENOUS at 01:27

## 2019-05-23 RX ADMIN — HYDROCODONE BITARTRATE AND ACETAMINOPHEN 1 TABLET: 10; 325 TABLET ORAL at 19:11

## 2019-05-23 RX ADMIN — Medication 1 CAPSULE: at 14:27

## 2019-05-23 RX ADMIN — HEPARIN SODIUM 5000 UNITS: 5000 INJECTION INTRAVENOUS; SUBCUTANEOUS at 21:20

## 2019-05-23 RX ADMIN — Medication 1 CAPSULE: at 08:28

## 2019-05-23 RX ADMIN — SOTALOL HYDROCHLORIDE 40 MG: 80 TABLET ORAL at 08:27

## 2019-05-23 RX ADMIN — VENLAFAXINE HYDROCHLORIDE 75 MG: 75 CAPSULE, EXTENDED RELEASE ORAL at 21:20

## 2019-05-23 RX ADMIN — HYDROCODONE BITARTRATE AND ACETAMINOPHEN 1 TABLET: 10; 325 TABLET ORAL at 10:19

## 2019-05-23 RX ADMIN — Medication 1 CAPSULE: at 19:12

## 2019-05-23 RX ADMIN — METRONIDAZOLE 500 MG: 500 INJECTION, SOLUTION INTRAVENOUS at 08:26

## 2019-05-23 RX ADMIN — METRONIDAZOLE 500 MG: 500 INJECTION, SOLUTION INTRAVENOUS at 19:12

## 2019-05-23 RX ADMIN — Medication 10 ML: at 10:15

## 2019-05-23 RX ADMIN — TRAZODONE HYDROCHLORIDE 50 MG: 50 TABLET ORAL at 21:20

## 2019-05-23 RX ADMIN — POTASSIUM BICARBONATE 20 MEQ: 782 TABLET, EFFERVESCENT ORAL at 19:11

## 2019-05-23 RX ADMIN — POTASSIUM BICARBONATE 20 MEQ: 782 TABLET, EFFERVESCENT ORAL at 08:27

## 2019-05-23 RX ADMIN — FIDAXOMICIN 200 MG: 200 TABLET, FILM COATED ORAL at 21:37

## 2019-05-23 RX ADMIN — VANCOMYCIN HYDROCHLORIDE 500 MG: 1 INJECTION, POWDER, LYOPHILIZED, FOR SOLUTION INTRAVENOUS at 06:27

## 2019-05-23 RX ADMIN — VANCOMYCIN HYDROCHLORIDE 250 MG: 250 CAPSULE ORAL at 21:20

## 2019-05-23 RX ADMIN — VANCOMYCIN HYDROCHLORIDE 250 MG: 250 CAPSULE ORAL at 19:11

## 2019-05-23 RX ADMIN — HEPARIN SODIUM 5000 UNITS: 5000 INJECTION INTRAVENOUS; SUBCUTANEOUS at 06:26

## 2019-05-23 ASSESSMENT — PAIN - FUNCTIONAL ASSESSMENT
PAIN_FUNCTIONAL_ASSESSMENT: PREVENTS OR INTERFERES SOME ACTIVE ACTIVITIES AND ADLS
PAIN_FUNCTIONAL_ASSESSMENT: PREVENTS OR INTERFERES SOME ACTIVE ACTIVITIES AND ADLS
PAIN_FUNCTIONAL_ASSESSMENT: PREVENTS OR INTERFERES WITH MANY ACTIVE NOT PASSIVE ACTIVITIES
PAIN_FUNCTIONAL_ASSESSMENT: PREVENTS OR INTERFERES WITH MANY ACTIVE NOT PASSIVE ACTIVITIES

## 2019-05-23 ASSESSMENT — PAIN DESCRIPTION - ORIENTATION
ORIENTATION: MID

## 2019-05-23 ASSESSMENT — PAIN DESCRIPTION - FREQUENCY
FREQUENCY: CONTINUOUS

## 2019-05-23 ASSESSMENT — PAIN DESCRIPTION - PROGRESSION
CLINICAL_PROGRESSION: NOT CHANGED

## 2019-05-23 ASSESSMENT — PAIN DESCRIPTION - LOCATION: LOCATION: BACK;RECTUM

## 2019-05-23 ASSESSMENT — PAIN DESCRIPTION - PAIN TYPE
TYPE: ACUTE PAIN;CHRONIC PAIN

## 2019-05-23 ASSESSMENT — PAIN SCALES - GENERAL
PAINLEVEL_OUTOF10: 0
PAINLEVEL_OUTOF10: 10
PAINLEVEL_OUTOF10: 7
PAINLEVEL_OUTOF10: 0
PAINLEVEL_OUTOF10: 7
PAINLEVEL_OUTOF10: 0
PAINLEVEL_OUTOF10: 6
PAINLEVEL_OUTOF10: 0
PAINLEVEL_OUTOF10: 0

## 2019-05-23 ASSESSMENT — PAIN DESCRIPTION - DESCRIPTORS
DESCRIPTORS: ACHING;CONSTANT

## 2019-05-23 ASSESSMENT — PAIN DESCRIPTION - ONSET
ONSET: ON-GOING

## 2019-05-23 NOTE — PROGRESS NOTES
MICRO:  5/16 BC x2 neg   C diff EIA +      IMAGING:  AXR 5/23:  FINDINGS:   There is an indeterminate bowel gas pattern with stool and air present   throughout the colon.  There is no pneumoperitoneum.  There is bibasilar   scarring.  The cardiac silhouette is enlarged.  Degenerative changes involve   the lumbar spine and bilateral hips status post posterior decompression   stabilization of L4-5. CT abd 5/16:  Pan colitis     Assessment:     Patient Active Problem List    Diagnosis Date Noted    Frequent falls      Priority: High    Lactic acidosis     Leukemoid reaction     Infection requiring contact isolation precautions 05/19/2019    Acute renal failure (ARF) ([address] 05/19/2019    Pancolitis ([address] 05/19/2019    Acute kidney injury ([address]     Dehydration     Coronary artery disease due to lipid rich plaque     Overweight (BMI 25.0-29. 9)     Weight loss counseling, encounter for     C. difficile colitis 05/16/2019    Moderate malnutrition ([address] 04/01/2019    Confusion     Acute encephalopathy 02/26/2019    Permanent atrial fibrillation (HCC)     Atrial fibrillation ([address] 01/13/2014    Shortness of breath 01/13/2014    Hypertension 01/13/2014    Hyperlipidemia 01/13/2014    Anemia 01/13/2014    Back pain 06/07/2011    Low back pain 06/07/2011    Facet arthropathy 06/07/2011    DDD (degenerative disc disease), lumbar 06/07/2011    Primary localized osteoarthrosis, lower leg 04/27/2010     History of afib, rec UTI    Admitted 5/16 with diarrhea, confirmed, severe C diff infection  Slow improvement with IV/PO/MT therapy      SHANTEL - resolving     No abx allergies     Plan:   Decline in WBC is reassuring   She is quite uncomfortable with enemas, will stop that part of the treatment and continue to watch closely.   I see that hospice is being considered       Discussed with patient/family, all questions answered  D/w EDILBERTO Polanco MD  Phone: [phone]   Fax : [phone]

## 2019-05-23 NOTE — PLAN OF CARE
Problem: Falls - Risk of:  Goal: Will remain free from falls  Description  Will remain free from falls  Outcome: Ongoing  Goal: Absence of physical injury  Description  Absence of physical injury  Outcome: Ongoing  Bed alarm is on pts bed and in working order. Problem: Risk for Impaired Skin Integrity  Goal: Tissue integrity - skin and mucous membranes  Description  Structural intactness and normal physiological function of skin and  mucous membranes. Outcome: Ongoing     Problem: Nutrition  Goal: Optimal nutrition therapy  Outcome: Ongoing     Problem: Bowel/Gastric:  Goal: Occurrences of diarrhea will decrease  Description  Occurrences of diarrhea will decrease  Outcome: Ongoing     Problem: Fluid Volume:  Goal: Will show no signs and symptoms of electrolyte imbalance  Description  Will show no signs and symptoms of electrolyte imbalance  Outcome: Ongoing     Problem: Physical Regulation:  Goal: Prevent transmision of infection  Description  Prevent transmision of infection  Outcome: Ongoing  Goal: Ability to avoid or minimize complications of infection will improve  Description  Ability to avoid or minimize complications of infection will improve  Outcome: Ongoing  Goal: Signs and symptoms of infection will decrease  Description  Signs and symptoms of infection will decrease  Outcome: Ongoing     Problem: Skin Integrity:  Goal: Risk for impaired skin integrity will decrease  Description  Risk for impaired skin integrity will decrease  Outcome: Ongoing  Pt has been cleaned after incontinent episodes with moisturizing wipes. Problem: Pain:  Goal: Pain level will decrease  Description  Pain level will decrease  Outcome: Ongoing  Pt has been medicated for pain as ordered.    Goal: Control of acute pain  Description  Control of acute pain  Outcome: Ongoing  Goal: Control of chronic pain  Description  Control of chronic pain  Outcome: Ongoing

## 2019-05-23 NOTE — PROGRESS NOTES
[facility] Medicine Progress Note      Admit Date: 5/16/2019         Overnight Events: No    CC: F/U for cdiff pancolitis    HPI: The patient is an 81 yo female, with a pmh of chronic systolic CHF, chronic atrial fibrillation, and CAD, admitted with fatigue and diarrhea. The patient noted several days of diarrhea prior to admission, with associated abd pain. She had recently been on Abx for a UTI. In the ER, a CT a/p showed pancolitis. The patient was found to be C diff positive. She was admitted for sepsis 2/2 c.diff pancolitis. IV flagyl and PO vancomycin were initiated. Infectious disease was consulted. Fidaxomicin was added. WBC continued to trend upward. General surgery was consulted due to the severity of symptoms. Vancomycin enemas were added to medical regimen. The patient made it very clear that she did not want surgery and was focused on quality of life. This was discussed with both the patient and her son. Code status was changed to [facility]. Palliative care was consulted. Hospice was consulted and the decision was made to discharge to hospice after completion of IV abx. Interval History/Subjective: No new complaints. She has continued ABD distention and discomfort. Review of Systems:     _____________________________________________________________________  General ROS:  [x] N/A [] Other:  _____________________________________________________________________  HEENT ROS:  [x] N/A [] Other:  _____________________________________________________________________  Respiratory ROS:  [x] N/A [] Other:   _____________________________________________________________________  Cardiovascular ROS:  [x] N/A [] Other:  _____________________________________________________________________   Gastrointestinal ROS:  [] N/A [x] Other: Continued diarrhea.  Continued abd distention and discomfort.  _____________________________________________________________________  Genitourinary:  [x] N/A [] Subcutaneous 3 times per day     Continuous Infusions:    PRN Meds:. HYDROcodone-acetaminophen, acetaminophen, tiZANidine, sodium chloride flush, ondansetron    PHYSICAL EXAM:  BP (!) 143/71   Pulse 67   Temp 97.2 °F (36.2 °C) (Oral)   Resp 18   Ht 4' 9\" (1.448 m)   Wt 150 lb 9.2 oz (68.3 kg)   SpO2 96%   BMI 32.58 kg/m²       Intake/Output Summary (Last 24 hours) at 5/23/2019 0907  Last data filed at 5/23/2019 0127  Gross per 24 hour   Intake 340 ml   Output --   Net 340 ml       General: Alert and oriented. Sitting at EOB in NAD, although she does appear uncomfortable. Pleasant and cooperative. HEENT: Normocephalic. Atraumatic. Pupils equal and reactive. EOM intact. Oral mucosa pink/moist/intact. Neck: Supple. Symmetrical. Trachea midline. Lungs: Clear to auscultation bilaterally. Shallow inspiration 2/2 abd distention. Chest: Exam unremarkable. Cardiac: S1/S2 noted. Regular Rhythm and rate. Abdomen/GI: Soft. Diffusely tender. Distended. BS+. Extremities: PP+. Atraumatic. No redness/cyanosis/edema noted. Brisk cap refill. Skin: Dry and intact. No lesions noted. Neuro: Grossly intact. No focal deficits noted. LABS:    Lab Results   Component Value Date    WBC 28.6 (H) 05/23/2019    HGB 13.5 05/23/2019    HCT 41.1 05/23/2019    MCV 96.2 05/23/2019     05/23/2019    LYMPHOPCT 12.0 05/22/2019    RBC 4.28 05/23/2019    MCH 31.6 05/23/2019    MCHC 32.9 05/23/2019    RDW 15.0 05/23/2019       Lab Results   Component Value Date    CREATININE 1.1 05/23/2019    BUN 40 (H) 05/23/2019     05/23/2019    K 3.4 (L) 05/23/2019     05/23/2019    CO2 19 (L) 05/23/2019       Lab Results   Component Value Date    MG 2.10 05/23/2019       Lab Results   Component Value Date    ALT 13 05/16/2019    AST 19 05/16/2019    ALKPHOS 106 05/16/2019    BILITOT 0.3 05/16/2019        No flowsheet data found.     Imaging:  XR CHEST STANDARD (2 VW)   Final Result   No acute cardiopulmonary abnormality is LIQUID;  Dietary Nutrition Supplements: Clear Liquid Oral Supplement    Consults:  IP CONSULT TO DIETITIAN  IP CONSULT TO INFECTIOUS DISEASES  IP CONSULT TO GENERAL SURGERY    Disposition:  [] Home  [] Home with home health [] Rehab [] Psych [] SNF  [] LTAC  [] Long term nursing home or group home [] Transfer to ICU  [] Transfer to PCU [x] Other: TBD    Code Status: DNR-CC    ELOS: TBD      CHERELLE Jain NP  05/23/19

## 2019-05-23 NOTE — CARE COORDINATION
Dominique aware [facility] is following patient for possible dc to Hospice Inpatient Unit after IV antibiotic treatment.     Electronically signed by Paty Paige on 5/23/2019 at 2:00 PM

## 2019-05-23 NOTE — PROGRESS NOTES
Pt rounded on and in bed resting. Pain is currently controlled. Pt has been incontinent of stool. IV ATB as ordered. IV flushes well without resistance. Pt able to make needs known. Call light in reach.

## 2019-05-23 NOTE — CARE COORDINATION
Met with patient with son bedside with expressed goal of completion of abx regimen then transition to inpatient care center with [facility] eventually returning home. HOC follow up in AM.    Yun Carnes RN, [facility]  - [place] Liaison  [address]: [phone]  C: [phone]  F: [phone]  Referrals and Information: [facility] [address] [place] [address]. [facility]  [email or web address]

## 2019-05-23 NOTE — PROGRESS NOTES
Pt in bed this morning, c/o pain in the buttocks and rectum, related to constant incontinent bowel movements and enemas. Pt pain is being managed poorly and pt refused noon enema, will give analgesic and attempt for 1800 enema. Pt turns and repositions self, states she is just tired and would like to be left to sleep. Will continue to monitor and reassess.  Electronically signed by Remedios Sánchez RN on 5/23/2019 at 2:39 PM

## 2019-05-23 NOTE — PROGRESS NOTES
[facility]                                     Daily Progress Note                                                         Pt Name: Jose Alfredo Sánchez  Medical Record Number: [id number]  Date of Birth 5/6/1930   Today's Date: 5/23/2019  Chief Complaint   Patient presents with    Fatigue     x 3 days    Diarrhea        ASSESSMENT/PLAN  C diff colitis  -Unchanged abdominal tenderness and diarrhea  -continue oral vanc, IV flagyl, dificid, vanc enemas.   -Discussed the surgical option if she deteriorates, being a subtotal colectomy with end ileostomy. She is adamant that she does not surgery at all, and that she would prefer to die rather than have surgery. Patient states that her brother had an ostomy and she does not want that. Will follow and provide assistance. -OK for full liquids         Discharge Planning: per primary team    EDUCATION  Patient educated about Disease Process, Medications, Smoking Cessation, Oxygenation, Incentive Spirometry and Deep Breath and Cough, Diabetes, Hyperlipidemia, Smoking Cessation, Nutrition, Exercise and Hypertension    SUBJECTIVE  Marilynn \"I guess I feel a little bettter\"  Pain is not well controlled d/t back pain  She has no nausea and no vomiting. She has passed flatus and has had a bowel movement. She is tolerating ice chips. Current activity is up with assistance    OBJECTIVE  VITALS:  height is 4' 9\" (1.448 m) and weight is 150 lb 9.2 oz (68.3 kg). Her oral temperature is 97.2 °F (36.2 °C). Her blood pressure is 143/71 (abnormal) and her pulse is 67. Her respiration is 18 and oxygen saturation is 96%.    INTAKE/OUTPUT:      Intake/Output Summary (Last 24 hours) at 5/23/2019 6033  Last data filed at 5/23/2019 4918  Gross per 24 hour   Intake 820 ml   Output --   Net 820 ml     GENERAL: alert, no distress  LUNGS: clear to ausculation, without wheezes, rales or rhonci  HEART: normal rate and regular rhythm  ABDOMEN: soft, generalized tenderness, not peritoneal. No hernias or masses. EXTREMITY: no cyanosis, clubbing or edema  I/O last 3 completed shifts: In: 340 [P.O.:240; IV Piggyback:100]  Out: -   I/O this shift: In: 480 [P.O.:480]  Out: -     LABS  Recent Labs     05/23/19  0559   WBC 28.6*   HGB 13.5   HCT 41.1         K 3.4*      CO2 19*   BUN 40*   CREATININE 1.1   MG 2.10   CALCIUM 7.8*         Electronically signed by CHERELLE Mortensen CNP on 5/22/19 at 9:47 AM     [facility]   [phone] Office  [phone] Cell     Agree with above note. The patient was personally seen and examined. Merline Valdezoring is doing well. Pain present but controlled.   BM x 3    Abd soft, moderate right abdominal tenderness, minimally distended    WBC down to 28.6  Cr down to 1.1    A/P: 81 yo female with c diff colitis    Doing well  Continue IV, oral, and rectal antibiotics  Advance to full liquids  Will follow    Shyann Malin MD

## 2019-05-23 NOTE — CONSULTS
[facility]  Palliative Care   Consult Note    NAME:  Unruly Rain RECORD NUMBER:  [id number]  AGE: 80 y.o. GENDER: female  : 1930  TODAY'S DATE:  2019    Subjective     Reason for Consult:  goals of care and hospice discussion  Visit Type: Initial Consult      Anna Yoder is a 80 y.o. female referred by:   [x] Physician    PAST MEDICAL HISTORY      Diagnosis Date    Atrial fibrillation [facility] [address]     Carpal tunnel syndrome, bilateral     Cataract     CHF (congestive heart failure) ([facility] [address]     Clostridium difficile diarrhea 2019    Hyperlipidemia     Hypertension     MI (myocardial infarction) ([facility] [address]        PAST SURGICAL HISTORY  Past Surgical History:   Procedure Laterality Date    CARPAL TUNNEL RELEASE      CATARACT REMOVAL      EYE SURGERY      OTHER SURGICAL HISTORY  2017    Watchman procedure    SPINE SURGERY      TOTAL KNEE ARTHROPLASTY Bilateral        FAMILY HISTORY  Family History   Problem Relation Age of Onset    Other Mother     Other Father        SOCIAL HISTORY  Social History     Tobacco Use    Smoking status: Never Smoker    Smokeless tobacco: Never Used   Substance Use Topics    Alcohol use: No    Drug use: No       ALLERGIES  Allergies   Allergen Reactions    Elena Advanced Aspirin Ex St [Aspirin] Other (See Comments)     Elena back and body  Off balance and deaf       MEDICATIONS  No current facility-administered medications on file prior to encounter.       Current Outpatient Medications on File Prior to Encounter   Medication Sig Dispense Refill    clopidogrel (PLAVIX) 75 MG tablet TAKE 1 TABLET BY MOUTH EVERY DAY 90 tablet 2    aspirin 81 MG chewable tablet Take 1 tablet by mouth daily 30 tablet 3    venlafaxine (EFFEXOR XR) 75 MG extended release capsule Take 75 mg by mouth nightly       traZODone (DESYREL) 50 MG tablet Take 1 tablet by mouth nightly 30 tablet 0    calcium carbonate 600 MG TABS tablet Take 1 tablet by mouth every evening       pravastatin (PRAVACHOL) 40 MG tablet Take 40 mg by mouth every evening.  omeprazole (PRILOSEC) 40 MG capsule Take 40 mg by mouth daily as needed (acid reflux) Take 40 mg by mouth daily.  lisinopril (PRINIVIL;ZESTRIL) 40 MG tablet Take 40 mg by mouth every evening       polyethylene glycol (GLYCOLAX) packet Take 17 g by mouth daily as needed for Constipation      tiZANidine (ZANAFLEX) 2 MG tablet Take 2 mg by mouth every 8 hours as needed (muscle spasms)       sotalol (BETAPACE) 80 MG tablet Take 40 mg by mouth daily          Objective         BP (!) 143/71   Pulse 67   Temp 97.2 °F (36.2 °C) (Oral)   Resp 22   Ht 4' 9\" (1.448 m)   Wt 150 lb 9.2 oz (68.3 kg)   SpO2 96%   BMI 32.58 kg/m²     Code Status: DNR-CC    Advanced Directives: completed      Assessment        Management and Education    Persons available for education:       [x] Self     [] Caregiver       [] Spouse       [x] Other Family Member   []  Other    Spiritual History:  notified: Yes,     Does the patient have a Primary Care Physician? Yes    Level of patient/caregiver understanding able to:       [x] Verbalize Understanding   [] Demonstrate Understanding       [] Teach Back       [] Needs Reinforcement     []  Other:      Teaching Time:  1hours  0 minutes     Plan        Social Service Consult Made:  Yes  Assistance filling out Living Will/HPOA:  No      Discharge Plan:  Education/support to family  Education/support to patient  Providing support for coping/adaptation/distress of family  Providing support for coping/adaptation/distress of patient  Code status clarified: [facility]  Palliative care orders introduced  Provided information about hospice    Discharge Environment:  [x] Hospice Consult Agency: List provided chose [facility]   [x] Inpatient Hospice vs   [x] Home with Hospice Care     Discussion: I met with patient and her son.  They give a history of her living home alone, does have home care PT/OT and nurse once a week. She is independent at home and able to care for herself. She came to hospital for diarrhea found to have c diff she continues to complain of diffuse abd pain. She has stated she does not want surgery and is ready to die when it is her time. She does not want to do therapy and is tired of being stuck and having things done to her. We have discussed hospice at home vs inpatient vs ECF with therapy. Her and her son have agreed to speak to hospice. I will continue to follow Ms. Langford's care as needed. Thank you for allowing me to participate in the care of Ms. Lida Huang .      Electronically signed by Severo Pimenta, RN, [address] [place] [address] on 5/23/2019 at 12:28 PM  [address] [place] [address]  Office: [phone]

## 2019-05-24 LAB
ANION GAP SERPL CALCULATED.3IONS-SCNC: 11 MMOL/L (ref 3–16)
BUN BLDV-MCNC: 28 MG/DL (ref 7–20)
CALCIUM SERPL-MCNC: 8.2 MG/DL (ref 8.3–10.6)
CHLORIDE BLD-SCNC: 109 MMOL/L (ref 99–110)
CO2: 21 MMOL/L (ref 21–32)
CREAT SERPL-MCNC: 0.8 MG/DL (ref 0.6–1.2)
GFR AFRICAN AMERICAN: >60
GFR NON-AFRICAN AMERICAN: >60
GLUCOSE BLD-MCNC: 91 MG/DL (ref 70–99)
MAGNESIUM: 2.2 MG/DL (ref 1.8–2.4)
POTASSIUM SERPL-SCNC: 3.9 MMOL/L (ref 3.5–5.1)
SODIUM BLD-SCNC: 141 MMOL/L (ref 136–145)

## 2019-05-24 PROCEDURE — 36415 COLL VENOUS BLD VENIPUNCTURE: CPT

## 2019-05-24 PROCEDURE — 2500000003 HC RX 250 WO HCPCS: Performed by: INTERNAL MEDICINE

## 2019-05-24 PROCEDURE — 6370000000 HC RX 637 (ALT 250 FOR IP)

## 2019-05-24 PROCEDURE — 6370000000 HC RX 637 (ALT 250 FOR IP): Performed by: INTERNAL MEDICINE

## 2019-05-24 PROCEDURE — 1200000000 HC SEMI PRIVATE

## 2019-05-24 PROCEDURE — 85025 COMPLETE CBC W/AUTO DIFF WBC: CPT

## 2019-05-24 PROCEDURE — APPSS15 APP SPLIT SHARED TIME 0-15 MINUTES: Performed by: NURSE PRACTITIONER

## 2019-05-24 PROCEDURE — 80048 BASIC METABOLIC PNL TOTAL CA: CPT

## 2019-05-24 PROCEDURE — 6370000000 HC RX 637 (ALT 250 FOR IP): Performed by: NURSE PRACTITIONER

## 2019-05-24 PROCEDURE — 6360000002 HC RX W HCPCS: Performed by: INTERNAL MEDICINE

## 2019-05-24 PROCEDURE — 2580000003 HC RX 258: Performed by: INTERNAL MEDICINE

## 2019-05-24 PROCEDURE — 83735 ASSAY OF MAGNESIUM: CPT

## 2019-05-24 PROCEDURE — 6370000000 HC RX 637 (ALT 250 FOR IP): Performed by: SURGERY

## 2019-05-24 PROCEDURE — APPNB15 APP NON BILLABLE TIME 0-15 MINS: Performed by: NURSE PRACTITIONER

## 2019-05-24 PROCEDURE — 99232 SBSQ HOSP IP/OBS MODERATE 35: CPT | Performed by: SURGERY

## 2019-05-24 PROCEDURE — 94760 N-INVAS EAR/PLS OXIMETRY 1: CPT

## 2019-05-24 RX ORDER — HYDROCODONE BITARTRATE AND ACETAMINOPHEN 10; 325 MG/1; MG/1
1 TABLET ORAL EVERY 4 HOURS PRN
Status: DISCONTINUED | OUTPATIENT
Start: 2019-05-24 | End: 2019-05-27 | Stop reason: HOSPADM

## 2019-05-24 RX ADMIN — FIDAXOMICIN 200 MG: 200 TABLET, FILM COATED ORAL at 08:15

## 2019-05-24 RX ADMIN — POTASSIUM BICARBONATE 20 MEQ: 782 TABLET, EFFERVESCENT ORAL at 16:55

## 2019-05-24 RX ADMIN — VANCOMYCIN HYDROCHLORIDE 250 MG: 250 CAPSULE ORAL at 13:01

## 2019-05-24 RX ADMIN — VANCOMYCIN HYDROCHLORIDE 250 MG: 250 CAPSULE ORAL at 20:31

## 2019-05-24 RX ADMIN — HEPARIN SODIUM 5000 UNITS: 5000 INJECTION INTRAVENOUS; SUBCUTANEOUS at 21:54

## 2019-05-24 RX ADMIN — Medication 10 ML: at 20:31

## 2019-05-24 RX ADMIN — HYDROCODONE BITARTRATE AND ACETAMINOPHEN 1 TABLET: 10; 325 TABLET ORAL at 04:31

## 2019-05-24 RX ADMIN — METRONIDAZOLE 500 MG: 500 INJECTION, SOLUTION INTRAVENOUS at 16:55

## 2019-05-24 RX ADMIN — HYDROCODONE BITARTRATE AND ACETAMINOPHEN 1 TABLET: 10; 325 TABLET ORAL at 11:36

## 2019-05-24 RX ADMIN — VANCOMYCIN HYDROCHLORIDE 250 MG: 250 CAPSULE ORAL at 08:14

## 2019-05-24 RX ADMIN — HEPARIN SODIUM 5000 UNITS: 5000 INJECTION INTRAVENOUS; SUBCUTANEOUS at 15:00

## 2019-05-24 RX ADMIN — FIDAXOMICIN 200 MG: 200 TABLET, FILM COATED ORAL at 20:31

## 2019-05-24 RX ADMIN — METRONIDAZOLE 500 MG: 500 INJECTION, SOLUTION INTRAVENOUS at 08:15

## 2019-05-24 RX ADMIN — METRONIDAZOLE 500 MG: 500 INJECTION, SOLUTION INTRAVENOUS at 01:08

## 2019-05-24 RX ADMIN — VENLAFAXINE HYDROCHLORIDE 75 MG: 75 CAPSULE, EXTENDED RELEASE ORAL at 20:31

## 2019-05-24 RX ADMIN — Medication 1 CAPSULE: at 08:14

## 2019-05-24 RX ADMIN — VANCOMYCIN HYDROCHLORIDE 250 MG: 250 CAPSULE ORAL at 16:55

## 2019-05-24 RX ADMIN — SOTALOL HYDROCHLORIDE 40 MG: 80 TABLET ORAL at 08:15

## 2019-05-24 RX ADMIN — TRAZODONE HYDROCHLORIDE 50 MG: 50 TABLET ORAL at 20:31

## 2019-05-24 RX ADMIN — Medication 1 CAPSULE: at 16:55

## 2019-05-24 RX ADMIN — Medication 1 CAPSULE: at 13:01

## 2019-05-24 RX ADMIN — POTASSIUM BICARBONATE 20 MEQ: 782 TABLET, EFFERVESCENT ORAL at 08:15

## 2019-05-24 RX ADMIN — Medication 10 ML: at 08:15

## 2019-05-24 RX ADMIN — HYDROCODONE BITARTRATE AND ACETAMINOPHEN 1 TABLET: 10; 325 TABLET ORAL at 19:24

## 2019-05-24 ASSESSMENT — PAIN DESCRIPTION - LOCATION
LOCATION: BACK;LEG

## 2019-05-24 ASSESSMENT — PAIN DESCRIPTION - FREQUENCY
FREQUENCY: CONTINUOUS

## 2019-05-24 ASSESSMENT — PAIN DESCRIPTION - PROGRESSION
CLINICAL_PROGRESSION: GRADUALLY IMPROVING
CLINICAL_PROGRESSION: NOT CHANGED
CLINICAL_PROGRESSION: GRADUALLY IMPROVING
CLINICAL_PROGRESSION: GRADUALLY IMPROVING
CLINICAL_PROGRESSION: NOT CHANGED

## 2019-05-24 ASSESSMENT — PAIN DESCRIPTION - PAIN TYPE
TYPE: ACUTE PAIN;CHRONIC PAIN

## 2019-05-24 ASSESSMENT — PAIN DESCRIPTION - DESCRIPTORS
DESCRIPTORS: ACHING;CONSTANT

## 2019-05-24 ASSESSMENT — PAIN DESCRIPTION - ONSET
ONSET: ON-GOING

## 2019-05-24 ASSESSMENT — PAIN SCALES - GENERAL
PAINLEVEL_OUTOF10: 10
PAINLEVEL_OUTOF10: 7
PAINLEVEL_OUTOF10: 10
PAINLEVEL_OUTOF10: 10
PAINLEVEL_OUTOF10: 3
PAINLEVEL_OUTOF10: 9

## 2019-05-24 ASSESSMENT — PAIN - FUNCTIONAL ASSESSMENT
PAIN_FUNCTIONAL_ASSESSMENT: PREVENTS OR INTERFERES SOME ACTIVE ACTIVITIES AND ADLS

## 2019-05-24 ASSESSMENT — PAIN DESCRIPTION - ORIENTATION
ORIENTATION: RIGHT;LEFT
ORIENTATION: MID
ORIENTATION: RIGHT;LEFT
ORIENTATION: MID
ORIENTATION: RIGHT;LEFT
ORIENTATION: RIGHT;LEFT

## 2019-05-24 NOTE — PLAN OF CARE
Problem: Falls - Risk of:  Goal: Will remain free from falls  Description  Will remain free from falls  5/24/2019 1235 by Peña Martinez RN  Outcome: Ongoing  Note:   Fall risk assessment completed. Fall precautions in place. Call light within reach. Pt educated on calling for assistance before getting up. Walkway free of clutter. Will continue to monitor. Electronically signed by Peña Martinez RN on 5/24/2019 at 12:35 PM       Problem: Falls - Risk of:  Goal: Absence of physical injury  Description  Absence of physical injury  5/24/2019 1235 by Peña Martinez RN  Outcome: Ongoing  Note:   Fall risk assessment completed. Fall precautions in place. Call light within reach. Pt educated on calling for assistance before getting up. Walkway free of clutter. Will continue to monitor. Electronically signed by Peña Martinez RN on 5/24/2019 at 12:35 PM       Problem: Risk for Impaired Skin Integrity  Goal: Tissue integrity - skin and mucous membranes  Description  Structural intactness and normal physiological function of skin and  mucous membranes. 5/24/2019 1235 by Peña Martinez RN  Outcome: Ongoing  Note:   Patient assessed for skin breakdown. Will remind patient to turn or reposition q2hrs. Will continue to monitor and reassess. Electronically signed by Peña Martinez RN on 5/24/2019 at 12:35 PM     Problem: Nutrition  Goal: Optimal nutrition therapy  5/24/2019 1235 by Peña Martinez RN  Outcome: Ongoing  Note:   Patient tolerating PO. Will continue to monitor and reassess. Electronically signed by Peña Martinez RN on 5/24/2019 at 12:35 PM       Problem:  Bowel/Gastric:  Goal: Occurrences of diarrhea will decrease  Description  Occurrences of diarrhea will decrease  5/24/2019 1235 by Peña Martinez RN  Outcome: Ongoing     Problem: Fluid Volume:  Goal: Will show no signs and symptoms of electrolyte imbalance  Description  Will show no signs and symptoms of electrolyte imbalance  5/24/2019 1235 by Lorenzo Perez Dennis Potts RN  Outcome: Ongoing     Problem: Physical Regulation:  Goal: Prevent transmision of infection  Description  Prevent transmision of infection  5/24/2019 1235 by Flavio Potter RN  Outcome: Ongoing     Problem: Physical Regulation:  Goal: Ability to avoid or minimize complications of infection will improve  Description  Ability to avoid or minimize complications of infection will improve  5/24/2019 1235 by Flavio Potter RN  Outcome: Ongoing     Problem: Physical Regulation:  Goal: Signs and symptoms of infection will decrease  Description  Signs and symptoms of infection will decrease  5/24/2019 1235 by Flavio Potter RN  Outcome: Ongoing  Note:   No signs or symptoms of infection noted or reported. Will continue to monitor and reassess. Electronically signed by Flavio Potter RN on 5/24/2019 at 12:36 PM       Problem: Skin Integrity:  Goal: Risk for impaired skin integrity will decrease  Description  Risk for impaired skin integrity will decrease  5/24/2019 1235 by Flavio Potter RN  Outcome: Ongoing  Note:   Patient assessed for skin breakdown. Will remind patient to turn or reposition q2hrs. Will continue to monitor and reassess. Electronically signed by Flavio Potter RN on 5/24/2019 at 12:35 PM     Problem: Pain:  Goal: Pain level will decrease  Description  Pain level will decrease  5/24/2019 1235 by Flavio Potter RN  Outcome: Ongoing  Note:   Pt assessed for pain. Pt in pain and assessed with 0-10 pain rating scale. Pt given prescribed analgesic for pain. (See eMar) Pt satisfied with pain relief thus far. Will reassess and continue to monitor. Electronically signed by Flavio Potter RN on 5/24/2019 at 12:36 PM       Problem: Pain:  Goal: Control of acute pain  Description  Control of acute pain  5/24/2019 1235 by Flavio Potter RN  Outcome: Ongoing  Note:   Pt assessed for pain. Pt in pain and assessed with 0-10 pain rating scale. Pt given prescribed analgesic for pain.  (See eMar) Pt satisfied with pain relief thus far. Will reassess and continue to monitor. Electronically signed by So Ford RN on 5/24/2019 at 12:36 PM     Problem: Pain:  Goal: Control of chronic pain  Description  Control of chronic pain  5/24/2019 1235 by So Ford RN  Outcome: Ongoing  Note:   Pt assessed for pain. Pt in pain and assessed with 0-10 pain rating scale. Pt given prescribed analgesic for pain. (See eMar) Pt satisfied with pain relief thus far. Will reassess and continue to monitor.   Electronically signed by So Ford RN on 5/24/2019 at 12:36 PM

## 2019-05-24 NOTE — PROGRESS NOTES
Pt requesting to sit on the side of bed. Refusing to sit back in the bed or sitting in the chair. Expressed concerns about pt increased risk of falling. Would not compromise and persisted to sit on the side saying \"I aint gonna fall. I sit like this all the time\". Call light is within reach.    Electronically signed by David Brooks RN on 5/24/2019 at 5:06 PM

## 2019-05-24 NOTE — PROGRESS NOTES
[facility] Medicine Progress Note      Admit Date: 5/16/2019         Overnight Events: No    CC: F/U for cdiff pancolitis    HPI: The patient is an 81 yo female, with a pmh of chronic systolic CHF, chronic atrial fibrillation, and CAD, admitted with fatigue and diarrhea. The patient noted several days of diarrhea prior to admission, with associated abd pain. She had recently been on Abx for a UTI. In the ER, a CT a/p showed pancolitis. The patient was found to be C diff positive. She was admitted for sepsis 2/2 c.diff pancolitis. IV flagyl and PO vancomycin were initiated. Infectious disease was consulted. Fidaxomicin was added. WBC continued to trend upward. General surgery was consulted due to the severity of symptoms. Vancomycin enemas were added to medical regimen. The patient made it very clear that she did not want surgery and was focused on quality of life. This was discussed with both the patient and her son. Code status was changed to [facility]. Palliative care was consulted. Hospice was consulted and the decision was made to discharge to hospice after completion of IV abx. Interval History/Subjective: No new complaints. She does feel a little bit better today. Vanc enemas discontinued by ID. She continues to note ABD distention and pain. Review of Systems:     _____________________________________________________________________  General ROS:  [x] N/A [] Other:  _____________________________________________________________________  HEENT ROS:  [x] N/A [] Other:  _____________________________________________________________________  Respiratory ROS:  [x] N/A [] Other:   _____________________________________________________________________  Cardiovascular ROS:  [x] N/A [] Other:  _____________________________________________________________________   Gastrointestinal ROS:  [] N/A [x] Other: Continued diarrhea.  Continued abd distention and discomfort.  _____________________________________________________________________  Genitourinary:  [x] N/A [] Other:  _____________________________________________________________________  Musculoskeletal ROS:  [] N/A [x] Other: Chronic back pain  _____________________________________________________________________  Endocrine ROS:  [x] N/A [] Other:  _____________________________________________________________________  Neurological ROS:  [x] N/A [] Other:  _____________________________________________________________________  Psych ROS:  [x] N/A [] Other:  _____________________________________________________________________  Dermatological ROS:  [x] N/A [] Other:  _____________________________________________________________________    Comprehensive ROS negative except as mentioned above. Past Medical History:        Diagnosis Date    Atrial fibrillation (HCC)     Carpal tunnel syndrome, bilateral     Cataract     CHF (congestive heart failure) (HCC)     Clostridium difficile diarrhea 05/16/2019    Hyperlipidemia     Hypertension     MI (myocardial infarction) ([facility] [address]        Past Surgical History:        Procedure Laterality Date    CARPAL TUNNEL RELEASE      CATARACT REMOVAL      EYE SURGERY      OTHER SURGICAL HISTORY  07/19/2017    Watchman procedure    SPINE SURGERY      TOTAL KNEE ARTHROPLASTY Bilateral        Allergies:  Elena advanced aspirin ex st [aspirin]    Past medical and surgical history reviewed. Any changes have been noted.      Scheduled and prn Medications:    Scheduled Meds:   lidocaine  1 patch Transdermal Daily    potassium bicarb-citric acid  20 mEq Oral BID WC    lactobacillus  1 capsule Oral TID WC    Fidaxomicin  200 mg Oral BID    sotalol  40 mg Oral Daily    traZODone  50 mg Oral Nightly    venlafaxine  75 mg Oral Nightly    sodium chloride flush  10 mL Intravenous 2 times per day    metroNIDAZOLE  500 mg Intravenous Q8H    vancomycin  250 mg Oral 4x Daily    [x] PPI/[id number]  [] not indicated    Probiotic if on abx: [] Yes [] No [x] Not Indicated    Diet: Dietary Nutrition Supplements: Clear Liquid Oral Supplement  DIET FULL LIQUID;    Consults:  IP CONSULT TO DIETITIAN  IP CONSULT TO INFECTIOUS DISEASES  IP CONSULT TO GENERAL SURGERY  IP CONSULT TO PALLIATIVE CARE  IP CONSULT TO HOSPICE    Disposition:  [] Home  [] Home with home health [] Rehab [] Psych [] SNF  [] LTAC  [] Long term nursing home or group home [] Transfer to ICU  [] Transfer to PCU [x] Other: TBD    Code Status: DNR-CC    ELOS: TBD      CHERELLE Villafana - DANIEL  05/24/19

## 2019-05-24 NOTE — PROGRESS NOTES
Patient A&O. Tolerating PO. Call light within reach. Will continue to monitor and reassess.  Electronically signed by June Clark RN on 5/24/2019

## 2019-05-24 NOTE — PROGRESS NOTES
Pt resting quietly in bed with eyes closed. Bed locked and in the lowest position. Call light within reach. Will continue to monitor.   Electronically signed by Suad Merino RN on 5/24/2019 at 6:20 AM

## 2019-05-24 NOTE — PROGRESS NOTES
Pt resting quietly in bed with eyes closed. PM medications given. Bed locked and in the lowest position. Call light within reach. Will continue to monitor.  Electronically signed by Sammy Crow RN on 5/24/2019 at 12:18 AM

## 2019-05-24 NOTE — PROGRESS NOTES
NUTRITION THERAPY ASSESSMENT    Due to patient discharging to hospice, patient will be followed at low nutrition risk. Continue current nutrition interventions. Dietitian will sign off. If nutrition intervention is required, please submit a dietary consult.       Electronically signed by Evelyn Mack RD, GABO on 5/24/2019 at 12:44 PM

## 2019-05-24 NOTE — PLAN OF CARE
Problem: Falls - Risk of:  Goal: Will remain free from falls  Description  Will remain free from falls  Outcome: Ongoing  Note:   Patient educated on fall prevention. Call light is within reach, bed locked in lowest position, personal items within reach, and bed alarm is on. Will round on patient per unit guidelines. Problem: Falls - Risk of:  Goal: Absence of physical injury  Description  Absence of physical injury  Outcome: Ongoing  Note:   Pt is free of injury. No injury noted. Fall precautions in place. Call light within reach. Will monitor. Problem: Risk for Impaired Skin Integrity  Goal: Tissue integrity - skin and mucous membranes  Description  Structural intactness and normal physiological function of skin and  mucous membranes. Outcome: Ongoing  Note:   Alberto score assessed. Patient able to turn self. Educated patient on importance of repositioning to prevent skin issues. Problem: Nutrition  Goal: Optimal nutrition therapy  Outcome: Ongoing  Note:   Optimal nutrition therapy being met by staff. Problem: Bowel/Gastric:  Goal: Occurrences of diarrhea will decrease  Description  Occurrences of diarrhea will decrease  Outcome: Ongoing  Note:   Pt occurrences of diarrhea will decrease. Will monitor. Problem: Fluid Volume:  Goal: Will show no signs and symptoms of electrolyte imbalance  Description  Will show no signs and symptoms of electrolyte imbalance  Outcome: Ongoing  Note:   Pt will show no signs and symptoms of electrolyte imbalance. Will monitor. Problem: Physical Regulation:  Goal: Prevent transmision of infection  Description  Prevent transmision of infection  Outcome: Ongoing  Note:   Transmission of infection will be prevented. Will monitor.      Problem: Physical Regulation:  Goal: Ability to avoid or minimize complications of infection will improve  Description  Ability to avoid or minimize complications of infection will improve  Outcome: Ongoing  Note:   Pt ability to avoid or minimize complications of infection will improve. Will monitor. Problem: Physical Regulation:  Goal: Signs and symptoms of infection will decrease  Description  Signs and symptoms of infection will decrease  Outcome: Ongoing  Note:   Pt signs and symptoms of infection will decrease. Will monitor. Problem: Skin Integrity:  Goal: Risk for impaired skin integrity will decrease  Description  Risk for impaired skin integrity will decrease  Outcome: Ongoing  Note:   Alberto score assessed. Patient able to turn self. Educated patient on importance of repositioning to prevent skin issues. Problem: Pain:  Goal: Pain level will decrease  Description  Pain level will decrease  Outcome: Ongoing  Note:   Pain /discomfort being managed with PRN analgesics per MD orders. Patient able to express presence and absence of pain and rate pain appropriately using numerical scale. Problem: Pain:  Goal: Control of acute pain  Description  Control of acute pain  Outcome: Ongoing  Note:   Patient educated on acute pain. Taught patient to use call light to ask for pain medication. PRN pain medication given for acute pain. Will continue to monitor pain per unit protocol. Problem: Pain:  Goal: Control of chronic pain  Description  Control of chronic pain  Outcome: Ongoing  Note:   Educated patient on pain management. Will assess patients pain level per unit protocol, and provide pain management measures as needed.

## 2019-05-24 NOTE — PROGRESS NOTES
[facility] General and Vascular Surgery                                     Daily Progress Note                                                         Pt Name: Ros Harvey  Medical Record Number: [id number]  Date of Birth 5/6/1930   Today's Date: 5/24/2019  Chief Complaint   Patient presents with    Fatigue     x 3 days    Diarrhea        ASSESSMENT/PLAN  C diff colitis  -Unchanged abdominal tenderness and diarrhea. Says she is miserable.  -continue oral vanc, IV flagyl, dificid. Vanc enemas d/t poor tolerance and pain.  -Discussed the surgical option if she deteriorates, being a subtotal colectomy with end ileostomy. She is adamant that she does not surgery at all, and that she would prefer to die rather than have surgery. Patient states that her brother had an ostomy and she does not want that.    -likely DC to hospice per pt request. Will sign off. Please call with questions. Discharge Planning: per primary team    EDUCATION  Patient educated about Disease Process, Medications, Smoking Cessation, Oxygenation, Incentive Spirometry and Deep Breath and Cough, Diabetes, Hyperlipidemia, Smoking Cessation, Nutrition, Exercise and Hypertension    SUBJECTIVE  Graciela Fry says she is miserable. Pain is not well controlled d/t back pain  She has no nausea and no vomiting. She has passed flatus and has had a bowel movement. She is tolerating ice chips. Current activity is up with assistance    OBJECTIVE  VITALS:  height is 4' 9\" (1.448 m) and weight is 146 lb 6.2 oz (66.4 kg). Her oral temperature is 97.2 °F (36.2 °C). Her blood pressure is 114/76 and her pulse is 84. Her respiration is 16 and oxygen saturation is 100%.    INTAKE/OUTPUT:      Intake/Output Summary (Last 24 hours) at 5/24/2019 1129  Last data filed at 5/23/2019 1954  Gross per 24 hour   Intake 480 ml   Output --   Net 480 ml     GENERAL: alert, no distress  LUNGS: clear to

## 2019-05-24 NOTE — PROGRESS NOTES
Informed pt of the discontinued vancomycin enemas, pt very appreciative. Pt up to bathroom x1 assist with front rolling walker, tolerated well. Still having loose, mucous-like stool. Pt states pain in her back, buttocks, and rectum from stool occurrences. Given prescribed analgesic (see eMAR). No other questions or concerns at this time. Will monitor and reassess.  Electronically signed by Joann Mancia RN on 5/23/2019 at 8:19 PM

## 2019-05-24 NOTE — PROGRESS NOTES
Physical Therapy    Antoinette Marshall  5/24/2019  Plans discharge to [facility]. PT discontinued.   Electronically signed by Charlene Rangel PT on 5/24/2019 at 11:10 AM

## 2019-05-24 NOTE — PROGRESS NOTES
Evening medications given without difficulty. Pt is sitting in bed with dinner tray. Denies pain and nausea at this time. Pt on contact plus precautions. Fall precautions are in place. Call light within reach. Will continue to monitor and reassess.   Electronically signed by Staci Cruz RN on 5/24/2019 at 5:01 PM

## 2019-05-24 NOTE — CARE COORDINATION
Erasmo Ortiz from [facility] reported that an inpatient bed is available for patient.     [facility]ron[facility] 5/24/2019 at 9:41 AM

## 2019-05-25 ENCOUNTER — APPOINTMENT (OUTPATIENT)
Dept: GENERAL RADIOLOGY | Age: 84
DRG: 872 | End: 2019-05-25
Payer: MEDICARE

## 2019-05-25 LAB
ANION GAP SERPL CALCULATED.3IONS-SCNC: 13 MMOL/L (ref 3–16)
BANDED NEUTROPHILS RELATIVE PERCENT: 3 % (ref 0–7)
BASOPHILS ABSOLUTE: 0 K/UL (ref 0–0.2)
BASOPHILS RELATIVE PERCENT: 0 %
BUN BLDV-MCNC: 17 MG/DL (ref 7–20)
CALCIUM SERPL-MCNC: 8.1 MG/DL (ref 8.3–10.6)
CHLORIDE BLD-SCNC: 108 MMOL/L (ref 99–110)
CO2: 22 MMOL/L (ref 21–32)
CREAT SERPL-MCNC: 0.7 MG/DL (ref 0.6–1.2)
EOSINOPHILS ABSOLUTE: 0.2 K/UL (ref 0–0.6)
EOSINOPHILS RELATIVE PERCENT: 1 %
GFR AFRICAN AMERICAN: >60
GFR NON-AFRICAN AMERICAN: >60
GLUCOSE BLD-MCNC: 156 MG/DL (ref 70–99)
HCT VFR BLD CALC: 42.8 % (ref 36–48)
HEMOGLOBIN: 14 G/DL (ref 12–16)
LYMPHOCYTES ABSOLUTE: 1.6 K/UL (ref 1–5.1)
LYMPHOCYTES RELATIVE PERCENT: 8 %
MAGNESIUM: 2 MG/DL (ref 1.8–2.4)
MCH RBC QN AUTO: 31.5 PG (ref 26–34)
MCHC RBC AUTO-ENTMCNC: 32.6 G/DL (ref 31–36)
MCV RBC AUTO: 96.5 FL (ref 80–100)
METAMYELOCYTES RELATIVE PERCENT: 1 %
MONOCYTES ABSOLUTE: 1 K/UL (ref 0–1.3)
MONOCYTES RELATIVE PERCENT: 5 %
NEUTROPHILS ABSOLUTE: 17.2 K/UL (ref 1.7–7.7)
NEUTROPHILS RELATIVE PERCENT: 82 %
NUCLEATED RED BLOOD CELLS: 1 /100 WBC
PDW BLD-RTO: 15.1 % (ref 12.4–15.4)
PLATELET # BLD: 307 K/UL (ref 135–450)
PMV BLD AUTO: 8.3 FL (ref 5–10.5)
POTASSIUM SERPL-SCNC: 3.6 MMOL/L (ref 3.5–5.1)
RBC # BLD: 4.44 M/UL (ref 4–5.2)
SODIUM BLD-SCNC: 143 MMOL/L (ref 136–145)
TOXIC GRANULATION: PRESENT
WBC # BLD: 20 K/UL (ref 4–11)

## 2019-05-25 PROCEDURE — 6360000002 HC RX W HCPCS: Performed by: INTERNAL MEDICINE

## 2019-05-25 PROCEDURE — 6370000000 HC RX 637 (ALT 250 FOR IP): Performed by: INTERNAL MEDICINE

## 2019-05-25 PROCEDURE — 6370000000 HC RX 637 (ALT 250 FOR IP): Performed by: SURGERY

## 2019-05-25 PROCEDURE — 2500000003 HC RX 250 WO HCPCS: Performed by: INTERNAL MEDICINE

## 2019-05-25 PROCEDURE — 83735 ASSAY OF MAGNESIUM: CPT

## 2019-05-25 PROCEDURE — 36415 COLL VENOUS BLD VENIPUNCTURE: CPT

## 2019-05-25 PROCEDURE — 6370000000 HC RX 637 (ALT 250 FOR IP): Performed by: NURSE PRACTITIONER

## 2019-05-25 PROCEDURE — 85025 COMPLETE CBC W/AUTO DIFF WBC: CPT

## 2019-05-25 PROCEDURE — 99232 SBSQ HOSP IP/OBS MODERATE 35: CPT | Performed by: INTERNAL MEDICINE

## 2019-05-25 PROCEDURE — 6370000000 HC RX 637 (ALT 250 FOR IP)

## 2019-05-25 PROCEDURE — 2580000003 HC RX 258: Performed by: INTERNAL MEDICINE

## 2019-05-25 PROCEDURE — 1200000000 HC SEMI PRIVATE

## 2019-05-25 PROCEDURE — 94760 N-INVAS EAR/PLS OXIMETRY 1: CPT

## 2019-05-25 PROCEDURE — 74018 RADEX ABDOMEN 1 VIEW: CPT

## 2019-05-25 PROCEDURE — 80048 BASIC METABOLIC PNL TOTAL CA: CPT

## 2019-05-25 RX ADMIN — VANCOMYCIN HYDROCHLORIDE 250 MG: 250 CAPSULE ORAL at 13:26

## 2019-05-25 RX ADMIN — Medication 1 CAPSULE: at 13:26

## 2019-05-25 RX ADMIN — VANCOMYCIN HYDROCHLORIDE 250 MG: 250 CAPSULE ORAL at 20:57

## 2019-05-25 RX ADMIN — METRONIDAZOLE 500 MG: 500 INJECTION, SOLUTION INTRAVENOUS at 17:06

## 2019-05-25 RX ADMIN — HYDROCODONE BITARTRATE AND ACETAMINOPHEN 1 TABLET: 10; 325 TABLET ORAL at 03:15

## 2019-05-25 RX ADMIN — HYDROCODONE BITARTRATE AND ACETAMINOPHEN 1 TABLET: 10; 325 TABLET ORAL at 13:32

## 2019-05-25 RX ADMIN — SOTALOL HYDROCHLORIDE 40 MG: 80 TABLET ORAL at 08:33

## 2019-05-25 RX ADMIN — FIDAXOMICIN 200 MG: 200 TABLET, FILM COATED ORAL at 08:33

## 2019-05-25 RX ADMIN — VANCOMYCIN HYDROCHLORIDE 250 MG: 250 CAPSULE ORAL at 17:06

## 2019-05-25 RX ADMIN — Medication 1 CAPSULE: at 17:06

## 2019-05-25 RX ADMIN — Medication 1 CAPSULE: at 08:33

## 2019-05-25 RX ADMIN — POTASSIUM BICARBONATE 20 MEQ: 782 TABLET, EFFERVESCENT ORAL at 08:33

## 2019-05-25 RX ADMIN — Medication 10 ML: at 20:57

## 2019-05-25 RX ADMIN — HYDROCODONE BITARTRATE AND ACETAMINOPHEN 1 TABLET: 10; 325 TABLET ORAL at 20:14

## 2019-05-25 RX ADMIN — TRAZODONE HYDROCHLORIDE 50 MG: 50 TABLET ORAL at 20:57

## 2019-05-25 RX ADMIN — HEPARIN SODIUM 5000 UNITS: 5000 INJECTION INTRAVENOUS; SUBCUTANEOUS at 05:30

## 2019-05-25 RX ADMIN — METRONIDAZOLE 500 MG: 500 INJECTION, SOLUTION INTRAVENOUS at 00:44

## 2019-05-25 RX ADMIN — HEPARIN SODIUM 5000 UNITS: 5000 INJECTION INTRAVENOUS; SUBCUTANEOUS at 13:26

## 2019-05-25 RX ADMIN — FIDAXOMICIN 200 MG: 200 TABLET, FILM COATED ORAL at 20:57

## 2019-05-25 RX ADMIN — VENLAFAXINE HYDROCHLORIDE 75 MG: 75 CAPSULE, EXTENDED RELEASE ORAL at 20:57

## 2019-05-25 RX ADMIN — METRONIDAZOLE 500 MG: 500 INJECTION, SOLUTION INTRAVENOUS at 08:33

## 2019-05-25 RX ADMIN — Medication 10 ML: at 08:36

## 2019-05-25 RX ADMIN — VANCOMYCIN HYDROCHLORIDE 250 MG: 250 CAPSULE ORAL at 08:33

## 2019-05-25 RX ADMIN — HEPARIN SODIUM 5000 UNITS: 5000 INJECTION INTRAVENOUS; SUBCUTANEOUS at 20:57

## 2019-05-25 RX ADMIN — HYDROCODONE BITARTRATE AND ACETAMINOPHEN 1 TABLET: 10; 325 TABLET ORAL at 08:33

## 2019-05-25 RX ADMIN — POTASSIUM BICARBONATE 20 MEQ: 782 TABLET, EFFERVESCENT ORAL at 17:06

## 2019-05-25 ASSESSMENT — PAIN DESCRIPTION - PAIN TYPE
TYPE: CHRONIC PAIN
TYPE: ACUTE PAIN
TYPE: CHRONIC PAIN
TYPE: CHRONIC PAIN
TYPE: ACUTE PAIN
TYPE: CHRONIC PAIN
TYPE: CHRONIC PAIN

## 2019-05-25 ASSESSMENT — PAIN DESCRIPTION - ONSET
ONSET: ON-GOING

## 2019-05-25 ASSESSMENT — PAIN DESCRIPTION - ORIENTATION
ORIENTATION: MID
ORIENTATION: LOWER
ORIENTATION: MID
ORIENTATION: LOWER

## 2019-05-25 ASSESSMENT — PAIN SCALES - WONG BAKER
WONGBAKER_NUMERICALRESPONSE: 0
WONGBAKER_NUMERICALRESPONSE: 0

## 2019-05-25 ASSESSMENT — PAIN SCALES - GENERAL
PAINLEVEL_OUTOF10: 6
PAINLEVEL_OUTOF10: 10
PAINLEVEL_OUTOF10: 8
PAINLEVEL_OUTOF10: 3
PAINLEVEL_OUTOF10: 8
PAINLEVEL_OUTOF10: 10
PAINLEVEL_OUTOF10: 0
PAINLEVEL_OUTOF10: 10
PAINLEVEL_OUTOF10: 0
PAINLEVEL_OUTOF10: 6

## 2019-05-25 ASSESSMENT — PAIN DESCRIPTION - PROGRESSION
CLINICAL_PROGRESSION: NOT CHANGED
CLINICAL_PROGRESSION: GRADUALLY WORSENING
CLINICAL_PROGRESSION: NOT CHANGED
CLINICAL_PROGRESSION: GRADUALLY IMPROVING
CLINICAL_PROGRESSION: NOT CHANGED

## 2019-05-25 ASSESSMENT — PAIN DESCRIPTION - FREQUENCY
FREQUENCY: CONTINUOUS

## 2019-05-25 ASSESSMENT — PAIN DESCRIPTION - DESCRIPTORS
DESCRIPTORS: ACHING
DESCRIPTORS: CONSTANT
DESCRIPTORS: CONSTANT
DESCRIPTORS: ACHING

## 2019-05-25 ASSESSMENT — PAIN DESCRIPTION - LOCATION
LOCATION: BACK
LOCATION: ABDOMEN
LOCATION: BACK
LOCATION: ABDOMEN

## 2019-05-25 NOTE — PROGRESS NOTES
Pt has remained drowsy most of the day. Pt has complaints of chronic back pain. Norco administered as directed. Pt tolerating general diet, no complaints of ABD pain and/or nausea. Will monitor.   Electronically signed by Juan Francisco Escobar RN on 5/25/2019 at 4:53 PM

## 2019-05-25 NOTE — PROGRESS NOTES
Pt A&O. AM meds completed. Pt rates chronic back pain at a 10. Norco administered per orders. Pt refuses tizanidine and lidocaine patch. Will monitor.

## 2019-05-25 NOTE — PLAN OF CARE
Problem: Falls - Risk of:  Goal: Will remain free from falls  Description  Will remain free from falls  5/25/2019 1104 by Ai Aranda RN  Outcome: Ongoing  Note:   Fall risk assessment completed. Fall precautions in place. Call light within reach. Pt educated on calling for assistance before getting up. Walkway free of clutter. Will continue to monitor. Problem: Risk for Impaired Skin Integrity  Goal: Tissue integrity - skin and mucous membranes  Description  Structural intactness and normal physiological function of skin and  mucous membranes. 5/25/2019 1104 by Ai Aranda RN  Outcome: Ongoing  Note:   Alberto assessed. Skin assessment completed. Pt able to turn/reposition self q2h to prevent skin breakdown. Redness noted to bottom d/t bowel incontinence. Barrier paste applied. Pericare PRN. No new skin issues to note. Problem: Bowel/Gastric:  Goal: Occurrences of diarrhea will decrease  Description  Occurrences of diarrhea will decrease  5/25/2019 1104 by Ai Aranda RN  Outcome: Ongoing  Note:   Pt continues with small amt of diarrhea. I&O documented. Medications administered as directed. Problem: Fluid Volume:  Goal: Will show no signs and symptoms of electrolyte imbalance  Description  Will show no signs and symptoms of electrolyte imbalance  5/25/2019 1104 by Ai Aranda RN  Outcome: Ongoing  Note:   I&O documented. Labs monitored. Medications administered as directed. Problem: Physical Regulation:  Goal: Prevent transmision of infection  Description  Prevent transmision of infection  5/25/2019 1104 by Ai Aranda RN  Outcome: Ongoing  Note:   Labs monitored daily. Medications administered as directed. ID following. Isolation precautions followed per protocol.        Problem: Physical Regulation:  Goal: Signs and symptoms of infection will decrease  Description  Signs and symptoms of infection will decrease  5/25/2019 1104 by Everet Frankel, RN  Outcome: Ongoing  Note:   Lab work improving. I&O documented. Diarrhea slowed a bit per pt. Medications administered as directed. ID following. Problem: Pain:  Goal: Pain level will decrease  Description  Pain level will decrease  5/25/2019 1104 by Everet Frankel, RN  Outcome: Ongoing  Note:   Pt assessed for pain. Pt in pain and assessed with 0-10 pain rating scale. Pt given prescribed analgesic for pain. (See eMar) Pt satisfied with pain relief thus far. Will reassess and continue to monitor. Problem: Pain:  Goal: Control of acute pain  Description  Control of acute pain  5/25/2019 1104 by Everet Frankel, RN  Outcome: Ongoing  Note:   Pt assessed for pain. Pt in pain and assessed with 0-10 pain rating scale. Pt given prescribed analgesic for pain. (See eMar) Pt satisfied with pain relief thus far. Will reassess and continue to monitor. Problem: Pain:  Goal: Control of chronic pain  Description  Control of chronic pain  5/25/2019 1104 by Everet Frankel, RN  Outcome: Ongoing  Note:   Pt assessed for pain. Pt in pain and assessed with 0-10 pain rating scale. Pt given prescribed analgesic for pain. (See eMar) Pt satisfied with pain relief thus far. Will reassess and continue to monitor.

## 2019-05-25 NOTE — PROGRESS NOTES
Pt had 3 small episodes of diarrhea today. Carline care provided PRN. Barrier cream applied for redness.   Electronically signed by Juan Francisco Escobar RN on 5/25/2019 at 6:23 PM

## 2019-05-25 NOTE — PROGRESS NOTES
Infectious Disease Follow up Notes    CC :  Severe C diff colitis      Antibiotics:   dificid 200 po BID  Flagyl 500 IV q8  vanc 250 po q6   culturelle    Admit Date:   5/16/2019  Hospital Day: 10    Subjective:   She remains afebrile  UOP not measured  BM x3 recorded last 24 hours. She says she feels \"less touchy\" in the abd. On clear liquids, wants to try regular diet     Objective:     Patient Vitals for the past 8 hrs:   BP Temp Temp src Pulse Resp SpO2 Weight   05/25/19 0818 (!) 159/77 97.1 °F (36.2 °C) Oral 68 18 99 % --   05/25/19 0816 -- -- -- -- -- 92 % --   05/25/19 0529 -- -- -- -- -- -- 147 lb 14.9 oz (67.1 kg)       EXAM:  General:  Alert, cooperative, NAD    HEENT:  Sclera anicteric, conjunctiva full  OP MMM, no thrush    NECK:  supple     LUNGS:  Upper lobes clear     CV:  RRR     ABD:  Diffusely mildly tender, +BS, soft.   Distended     EXT: No focal rash        LINE: PIV site ok         Scheduled Meds:   lidocaine  1 patch Transdermal Daily    potassium bicarb-citric acid  20 mEq Oral BID WC    lactobacillus  1 capsule Oral TID WC    Fidaxomicin  200 mg Oral BID    sotalol  40 mg Oral Daily    traZODone  50 mg Oral Nightly    venlafaxine  75 mg Oral Nightly    sodium chloride flush  10 mL Intravenous 2 times per day    metroNIDAZOLE  500 mg Intravenous Q8H    vancomycin  250 mg Oral 4x Daily    heparin (porcine)  5,000 Units Subcutaneous 3 times per day        Data Review:    Lab Results   Component Value Date    WBC 25.8 (H) 05/24/2019    HGB 14.3 05/24/2019    HCT 44.2 05/24/2019    MCV 95.0 05/24/2019     05/24/2019     Lab Results   Component Value Date    CREATININE 0.8 05/24/2019    BUN 28 (H) 05/24/2019     05/24/2019    K 3.9 05/24/2019     05/24/2019    CO2 21 05/24/2019       Hepatic Function Panel:   Lab Results   Component Value Date    ALKPHOS 106 05/16/2019    ALT 13

## 2019-05-26 LAB
ANION GAP SERPL CALCULATED.3IONS-SCNC: 9 MMOL/L (ref 3–16)
BASOPHILS ABSOLUTE: 0 K/UL (ref 0–0.2)
BASOPHILS RELATIVE PERCENT: 0 %
BUN BLDV-MCNC: 12 MG/DL (ref 7–20)
CALCIUM SERPL-MCNC: 7.9 MG/DL (ref 8.3–10.6)
CHLORIDE BLD-SCNC: 108 MMOL/L (ref 99–110)
CO2: 23 MMOL/L (ref 21–32)
CREAT SERPL-MCNC: 0.7 MG/DL (ref 0.6–1.2)
EOSINOPHILS ABSOLUTE: 0 K/UL (ref 0–0.6)
EOSINOPHILS RELATIVE PERCENT: 0 %
GFR AFRICAN AMERICAN: >60
GFR NON-AFRICAN AMERICAN: >60
GLUCOSE BLD-MCNC: 105 MG/DL (ref 70–99)
HCT VFR BLD CALC: 39.6 % (ref 36–48)
HEMOGLOBIN: 12.9 G/DL (ref 12–16)
LYMPHOCYTES ABSOLUTE: 1.6 K/UL (ref 1–5.1)
LYMPHOCYTES RELATIVE PERCENT: 10 %
MAGNESIUM: 1.8 MG/DL (ref 1.8–2.4)
MCH RBC QN AUTO: 30.8 PG (ref 26–34)
MCHC RBC AUTO-ENTMCNC: 32.6 G/DL (ref 31–36)
MCV RBC AUTO: 94.5 FL (ref 80–100)
MONOCYTES ABSOLUTE: 0.8 K/UL (ref 0–1.3)
MONOCYTES RELATIVE PERCENT: 5 %
MYELOCYTE PERCENT: 1 %
NEUTROPHILS ABSOLUTE: 13.6 K/UL (ref 1.7–7.7)
NEUTROPHILS RELATIVE PERCENT: 84 %
PDW BLD-RTO: 14.7 % (ref 12.4–15.4)
PLATELET # BLD: 291 K/UL (ref 135–450)
PMV BLD AUTO: 8.6 FL (ref 5–10.5)
POTASSIUM SERPL-SCNC: 3.4 MMOL/L (ref 3.5–5.1)
RBC # BLD: 4.19 M/UL (ref 4–5.2)
RBC # BLD: NORMAL 10*6/UL
SLIDE REVIEW: ABNORMAL
SODIUM BLD-SCNC: 140 MMOL/L (ref 136–145)
TOXIC GRANULATION: PRESENT
WBC # BLD: 16 K/UL (ref 4–11)

## 2019-05-26 PROCEDURE — 2580000003 HC RX 258: Performed by: INTERNAL MEDICINE

## 2019-05-26 PROCEDURE — 85025 COMPLETE CBC W/AUTO DIFF WBC: CPT

## 2019-05-26 PROCEDURE — 1200000000 HC SEMI PRIVATE

## 2019-05-26 PROCEDURE — 6370000000 HC RX 637 (ALT 250 FOR IP)

## 2019-05-26 PROCEDURE — 6370000000 HC RX 637 (ALT 250 FOR IP): Performed by: SURGERY

## 2019-05-26 PROCEDURE — 6370000000 HC RX 637 (ALT 250 FOR IP): Performed by: NURSE PRACTITIONER

## 2019-05-26 PROCEDURE — 6370000000 HC RX 637 (ALT 250 FOR IP): Performed by: INTERNAL MEDICINE

## 2019-05-26 PROCEDURE — 94760 N-INVAS EAR/PLS OXIMETRY 1: CPT

## 2019-05-26 PROCEDURE — 36415 COLL VENOUS BLD VENIPUNCTURE: CPT

## 2019-05-26 PROCEDURE — 80048 BASIC METABOLIC PNL TOTAL CA: CPT

## 2019-05-26 PROCEDURE — 83735 ASSAY OF MAGNESIUM: CPT

## 2019-05-26 PROCEDURE — 2500000003 HC RX 250 WO HCPCS: Performed by: INTERNAL MEDICINE

## 2019-05-26 PROCEDURE — 6360000002 HC RX W HCPCS: Performed by: INTERNAL MEDICINE

## 2019-05-26 RX ORDER — MORPHINE SULFATE 10 MG/.5ML
2.5 SOLUTION ORAL EVERY 4 HOURS PRN
Status: DISCONTINUED | OUTPATIENT
Start: 2019-05-26 | End: 2019-05-27 | Stop reason: HOSPADM

## 2019-05-26 RX ADMIN — METRONIDAZOLE 500 MG: 500 INJECTION, SOLUTION INTRAVENOUS at 00:26

## 2019-05-26 RX ADMIN — VANCOMYCIN HYDROCHLORIDE 250 MG: 250 CAPSULE ORAL at 16:32

## 2019-05-26 RX ADMIN — HEPARIN SODIUM 5000 UNITS: 5000 INJECTION INTRAVENOUS; SUBCUTANEOUS at 06:36

## 2019-05-26 RX ADMIN — VANCOMYCIN HYDROCHLORIDE 250 MG: 250 CAPSULE ORAL at 09:29

## 2019-05-26 RX ADMIN — METRONIDAZOLE 500 MG: 500 INJECTION, SOLUTION INTRAVENOUS at 16:32

## 2019-05-26 RX ADMIN — Medication 1 CAPSULE: at 13:05

## 2019-05-26 RX ADMIN — HEPARIN SODIUM 5000 UNITS: 5000 INJECTION INTRAVENOUS; SUBCUTANEOUS at 13:04

## 2019-05-26 RX ADMIN — POTASSIUM BICARBONATE 20 MEQ: 782 TABLET, EFFERVESCENT ORAL at 09:29

## 2019-05-26 RX ADMIN — HYDROCODONE BITARTRATE AND ACETAMINOPHEN 1 TABLET: 10; 325 TABLET ORAL at 09:29

## 2019-05-26 RX ADMIN — HYDROCODONE BITARTRATE AND ACETAMINOPHEN 1 TABLET: 10; 325 TABLET ORAL at 16:32

## 2019-05-26 RX ADMIN — Medication 1 CAPSULE: at 09:29

## 2019-05-26 RX ADMIN — HYDROCODONE BITARTRATE AND ACETAMINOPHEN 1 TABLET: 10; 325 TABLET ORAL at 03:43

## 2019-05-26 RX ADMIN — SOTALOL HYDROCHLORIDE 40 MG: 80 TABLET ORAL at 09:29

## 2019-05-26 RX ADMIN — VENLAFAXINE HYDROCHLORIDE 75 MG: 75 CAPSULE, EXTENDED RELEASE ORAL at 20:47

## 2019-05-26 RX ADMIN — VANCOMYCIN HYDROCHLORIDE 250 MG: 250 CAPSULE ORAL at 13:05

## 2019-05-26 RX ADMIN — FIDAXOMICIN 200 MG: 200 TABLET, FILM COATED ORAL at 09:29

## 2019-05-26 RX ADMIN — HEPARIN SODIUM 5000 UNITS: 5000 INJECTION INTRAVENOUS; SUBCUTANEOUS at 21:30

## 2019-05-26 RX ADMIN — Medication 10 ML: at 09:30

## 2019-05-26 RX ADMIN — FIDAXOMICIN 200 MG: 200 TABLET, FILM COATED ORAL at 20:46

## 2019-05-26 RX ADMIN — METRONIDAZOLE 500 MG: 500 INJECTION, SOLUTION INTRAVENOUS at 09:30

## 2019-05-26 RX ADMIN — VANCOMYCIN HYDROCHLORIDE 250 MG: 250 CAPSULE ORAL at 20:47

## 2019-05-26 RX ADMIN — Medication 10 ML: at 20:46

## 2019-05-26 RX ADMIN — TRAZODONE HYDROCHLORIDE 50 MG: 50 TABLET ORAL at 20:47

## 2019-05-26 RX ADMIN — Medication 1 CAPSULE: at 16:32

## 2019-05-26 ASSESSMENT — PAIN DESCRIPTION - PAIN TYPE
TYPE: ACUTE PAIN
TYPE: CHRONIC PAIN
TYPE: ACUTE PAIN;CHRONIC PAIN
TYPE: ACUTE PAIN

## 2019-05-26 ASSESSMENT — PAIN DESCRIPTION - PROGRESSION
CLINICAL_PROGRESSION: NOT CHANGED
CLINICAL_PROGRESSION: GRADUALLY WORSENING

## 2019-05-26 ASSESSMENT — PAIN DESCRIPTION - DESCRIPTORS
DESCRIPTORS: ACHING;BURNING
DESCRIPTORS: ACHING;CRAMPING
DESCRIPTORS: ACHING;CRAMPING
DESCRIPTORS: CRAMPING
DESCRIPTORS: CRAMPING

## 2019-05-26 ASSESSMENT — PAIN DESCRIPTION - ORIENTATION
ORIENTATION: RIGHT
ORIENTATION: RIGHT
ORIENTATION: LOWER;RIGHT
ORIENTATION: LOWER
ORIENTATION: LOWER

## 2019-05-26 ASSESSMENT — PAIN - FUNCTIONAL ASSESSMENT
PAIN_FUNCTIONAL_ASSESSMENT: PREVENTS OR INTERFERES SOME ACTIVE ACTIVITIES AND ADLS

## 2019-05-26 ASSESSMENT — PAIN DESCRIPTION - FREQUENCY
FREQUENCY: CONTINUOUS

## 2019-05-26 ASSESSMENT — PAIN SCALES - GENERAL
PAINLEVEL_OUTOF10: 8
PAINLEVEL_OUTOF10: 10
PAINLEVEL_OUTOF10: 0
PAINLEVEL_OUTOF10: 6
PAINLEVEL_OUTOF10: 8
PAINLEVEL_OUTOF10: 10
PAINLEVEL_OUTOF10: 10
PAINLEVEL_OUTOF10: 2

## 2019-05-26 ASSESSMENT — PAIN DESCRIPTION - LOCATION
LOCATION: ABDOMEN;BACK
LOCATION: ABDOMEN
LOCATION: BACK
LOCATION: BACK;ABDOMEN
LOCATION: ABDOMEN

## 2019-05-26 ASSESSMENT — PAIN SCALES - WONG BAKER: WONGBAKER_NUMERICALRESPONSE: 0

## 2019-05-26 ASSESSMENT — PAIN DESCRIPTION - ONSET
ONSET: ON-GOING

## 2019-05-26 NOTE — PLAN OF CARE
Problem: Falls - Risk of:  Goal: Will remain free from falls  Description  Will remain free from falls  5/26/2019 1516 by Hyun Kidd RN  Outcome: Ongoing  Note:   Fall risk assessment completed. Fall precautions in place. Call light within reach. Pt educated on calling for assistance before getting up. Walkway free of clutter. Will continue to monitor. Problem: Risk for Impaired Skin Integrity  Goal: Tissue integrity - skin and mucous membranes  Description  Structural intactness and normal physiological function of skin and  mucous membranes. 5/26/2019 1516 by Hyun Kidd RN  Outcome: Ongoing  Note:   Alberto assessed. Skin assessment completed. Pt able to turn/reposition self q2h to prevent skin breakdown. No new skin issues to note. Problem: Nutrition  Goal: Optimal nutrition therapy  5/26/2019 1516 by Hyun Kidd RN  Outcome: Ongoing  Note:   Pt encouraged to eat 50% or more of each meal.  Pt with poor appetite. I&O monitored. Ensure with meals. Dietician following. Problem: Bowel/Gastric:  Goal: Occurrences of diarrhea will decrease  Description  Occurrences of diarrhea will decrease  5/26/2019 1516 by Hyun Kidd RN  Outcome: Ongoing  Note:   I&O monitored. Bowels assessed. Stool assessed. Carline care PRN. Medications administered as directed. Problem: Fluid Volume:  Goal: Will show no signs and symptoms of electrolyte imbalance  Description  Will show no signs and symptoms of electrolyte imbalance  5/26/2019 1516 by Hyun Kidd RN  Outcome: Ongoing  Note:   I&O monitored. Electrolytes monitored and replaced PRN. Labs monitored. VSS and vitals monitored per floor protocol. Problem: Pain:  Goal: Pain level will decrease  Description  Pain level will decrease  5/26/2019 1516 by Hyun Kidd RN  Outcome: Ongoing  Note:   Pt assessed for pain. Pt in pain and assessed with 0-10 pain rating scale.  Pt given prescribed analgesic

## 2019-05-26 NOTE — PROGRESS NOTES
[facility] Medicine Progress Note      Admit Date: 5/16/2019         Overnight Events: No    CC: F/U for cdiff pancolitis    HPI: The patient is an 81 yo female, with a pmh of chronic systolic CHF, chronic atrial fibrillation, and CAD, admitted with fatigue and diarrhea. The patient noted several days of diarrhea prior to admission, with associated abd pain. She had recently been on Abx for a UTI. In the ER, a CT a/p showed pancolitis. The patient was found to be C diff positive. She was admitted for sepsis 2/2 c.diff pancolitis. IV flagyl and PO vancomycin were initiated. Infectious disease was consulted. Fidaxomicin was added. WBC continued to trend upward. General surgery was consulted due to the severity of symptoms. Vancomycin enemas were added to medical regimen. The patient made it very clear that she did not want surgery and was focused on quality of life. This was discussed with both the patient and her son. Code status was changed to [facility]. Palliative care was consulted. Hospice was consulted and the decision was made to discharge to hospice after completion of IV abx. Interval History/Subjective: No new complaints. She continues to note SOB. Diarrhea continues to slow/stool is less watery, per the patient. Review of Systems:     _____________________________________________________________________  General ROS:  [x] N/A [] Other:  _____________________________________________________________________  HEENT ROS:  [x] N/A [] Other:  _____________________________________________________________________  Respiratory ROS:  [] N/A [x] Other: SOB persists. _____________________________________________________________________  Cardiovascular ROS:  [x] N/A [] Other:  _____________________________________________________________________   Gastrointestinal ROS:  [] N/A [x] Other: BMs are becoming thicker. ABD pain is improved with pan meds. _____________________________________________________________________  Genitourinary:  [x] N/A [] Other:  _____________________________________________________________________  Musculoskeletal ROS:  [] N/A [x] Other: Chronic back pain  _____________________________________________________________________  Endocrine ROS:  [x] N/A [] Other:  _____________________________________________________________________  Neurological ROS:  [x] N/A [] Other:  _____________________________________________________________________  Psych ROS:  [x] N/A [] Other:  _____________________________________________________________________  Dermatological ROS:  [x] N/A [] Other:  _____________________________________________________________________    Comprehensive ROS negative except as mentioned above. Past Medical History:        Diagnosis Date    Atrial fibrillation (HCC)     Carpal tunnel syndrome, bilateral     Cataract     CHF (congestive heart failure) (HCC)     Clostridium difficile diarrhea 05/16/2019    Hyperlipidemia     Hypertension     MI (myocardial infarction) ([facility] [address]        Past Surgical History:        Procedure Laterality Date    CARPAL TUNNEL RELEASE      CATARACT REMOVAL      EYE SURGERY      OTHER SURGICAL HISTORY  07/19/2017    Watchman procedure    SPINE SURGERY      TOTAL KNEE ARTHROPLASTY Bilateral        Allergies:  Elena advanced aspirin ex st [aspirin]    Past medical and surgical history reviewed. Any changes have been noted.      Scheduled and prn Medications:    Scheduled Meds:   lidocaine  1 patch Transdermal Daily    potassium bicarb-citric acid  20 mEq Oral BID WC    lactobacillus  1 capsule Oral TID WC    Fidaxomicin  200 mg Oral BID    sotalol  40 mg Oral Daily    traZODone  50 mg Oral Nightly    venlafaxine  75 mg Oral Nightly    sodium chloride flush  10 mL Intravenous 2 times per day    metroNIDAZOLE  500 mg Intravenous Q8H    vancomycin  250 mg Oral 4x Daily    heparin appearing abdomen         XR ABDOMEN (2 VIEWS)   Final Result   1. Indeterminate bowel gas pattern. XR CHEST STANDARD (2 VW)   Final Result   No acute cardiopulmonary abnormality is identified. Cardiac silhouette is enlarged, similar to prior study. CT ABDOMEN PELVIS WO CONTRAST   Final Result   Pancolitis, infectious or inflammatory. RECOMMENDATIONS:   1         XR CHEST PORTABLE   Final Result   No acute findings             Assessment & Plan:        C.diff pancolitis  ID following. Appreciate assistance  IV flagyl. PO Vancomycin. Dificid. Vancomycin enemas discontinued, per ID  Adjust abx per ID rec  Leukocytosis continues to trend down  Surgery consulted. - Does not want surgery/ostomy under any circumstances  - Does not want fecal transplant  - Surgery signed off  Palliative care consulted. Goals of care reviewed, now with plans to DC with hospice after IV abx are completed. - Informed the patient that if at any point she wishes to discontinue treatment to go to hospice, appropriate arrangements/adjustments will be made. She verbalized understanding.   - SL morphine initiated in addition to PRN norco for comfort. SOB   SOB 2/2 abd distention  Supplemental oxygen (titrate to SpO2 88-92%)  Treat underlying cause  Follow labs and vitals  OARRS noted. SL morphine initiated to hopefully help with any feelings of air hunger, as she is going to discharge to hospice. Chronic pain/Back pain/ABD pain  Norco  mg PO Q4H PRN  On Effexor  Has PRN tizanidine  Lidoderm patch    Sepsis, POA - resolved  2/2 c.diff pancolitis  On IV flagyl, PO Vancomycin, dificid, vancomycin enemas  Resolved    Acute renal failure - Resolved  2/2 diarrhea and poor PO intake  Baseline creatinine 0.8. Avoid nephrotoxic medications as able. Renally dose medications. Monitor for electrolyte abnormalities.   Follow labs    Afib  Sotalol    Hypokalemia - mild  2/2 poor intake and diarrhea  PO Replacement ordered  Monitor labs and replete as needed    Hypotension - now resolved  2/2 diarrhea/dehydration  Monitor vitals    Body mass index is 31.68 kg/m². The patient and / or the family were informed of the results of any tests, a time was given to answer questions, a plan was proposed and they agreed with plan.     DVT prophylaxis: [] Lovenox  [x] SQ Heparin  [] SCDs because of  [] warfarin/oral direct thrombin inhibitor [] Encourage ambulation    GI prophylaxis: [x] PPI/[id number]  [] not indicated    Probiotic if on abx: [x] Yes [] No [] Not Indicated    Diet: Dietary Nutrition Supplements: Clear Liquid Oral Supplement  DIET GENERAL;    Consults:  IP CONSULT TO DIETITIAN  IP CONSULT TO INFECTIOUS DISEASES  IP CONSULT TO GENERAL SURGERY  IP CONSULT TO PALLIATIVE CARE  IP CONSULT TO HOSPICE    Disposition:  [] Home  [] Home with home health [] Rehab [] Psych [] SNF  [] LTAC  [] Long term nursing home or group home [] Transfer to ICU  [] Transfer to PCU [x] Other: TBD    Code Status: DNR-CC    ELOS: CHERELLE Rivera NP  05/26/19

## 2019-05-26 NOTE — PLAN OF CARE
Problem: Falls - Risk of:  Goal: Will remain free from falls  Description  Will remain free from falls  5/26/2019 0308 by Gui Suárez RN  Outcome: Ongoing  5/25/2019 2327 by Gui Suárez RN  Outcome: Ongoing  Goal: Absence of physical injury  Description  Absence of physical injury  5/26/2019 0308 by Gui Suárez RN  Outcome: Ongoing  5/25/2019 2327 by Gui Suárez RN  Outcome: Ongoing     Problem: Risk for Impaired Skin Integrity  Goal: Tissue integrity - skin and mucous membranes  Description  Structural intactness and normal physiological function of skin and  mucous membranes. 5/26/2019 0308 by Gui Suárez RN  Outcome: Ongoing  5/25/2019 2327 by Gui Suárez RN  Outcome: Ongoing     Problem: Nutrition  Goal: Optimal nutrition therapy  5/26/2019 0308 by Gui Suárez RN  Outcome: Ongoing  5/25/2019 2327 by Gui Suárez RN  Outcome: Ongoing     Problem:  Bowel/Gastric:  Goal: Occurrences of diarrhea will decrease  Description  Occurrences of diarrhea will decrease  5/26/2019 0308 by Giu Suárez RN  Outcome: Ongoing  5/25/2019 2327 by Gui Suárez RN  Outcome: Ongoing     Problem: Fluid Volume:  Goal: Will show no signs and symptoms of electrolyte imbalance  Description  Will show no signs and symptoms of electrolyte imbalance  5/26/2019 0308 by Gui Suárez RN  Outcome: Ongoing  5/25/2019 2327 by Gui Suárez RN  Outcome: Ongoing     Problem: Physical Regulation:  Goal: Prevent transmision of infection  Description  Prevent transmision of infection  5/26/2019 0308 by Gui Suárez RN  Outcome: Ongoing  5/25/2019 2327 by Gui Suárez RN  Outcome: Ongoing  Goal: Signs and symptoms of infection will decrease  Description  Signs and symptoms of infection will decrease  5/26/2019 0308 by Gui Suárez RN  Outcome: Ongoing  5/25/2019 2327 by Gui Suárez RN  Outcome: Ongoing     Problem: Pain:  Goal: Pain level will decrease  Description  Pain level will decrease  5/26/2019 0308 by Gui Suárez RN  Outcome: Ongoing  5/25/2019 2327 by Shyann Lomeli RN  Outcome: Ongoing  Goal: Control of acute pain  Description  Control of acute pain  5/26/2019 0308 by Shyann Lomeli RN  Outcome: Ongoing  5/25/2019 2327 by Shyann Lomeli RN  Outcome: Ongoing  Goal: Control of chronic pain  Description  Control of chronic pain  5/26/2019 0308 by Shyann Lomeli RN  Outcome: Ongoing  5/25/2019 2327 by Shyann Lomeli RN  Outcome: Ongoing

## 2019-05-26 NOTE — PROGRESS NOTES
Pt was able to eat a little more for dinner. Norco given for abdominal and back pain. Pt denies nausea. Pt continues with scant to small amts of mucus, slightly bloody diarrhea. Zinc paste applied with barbi care for redness on buttocks. Pt expressing desire to go to [facility]. Will monitor.   Electronically signed by Jessica Johnson RN on 5/26/2019 at 6:24 PM

## 2019-05-26 NOTE — PROGRESS NOTES
Patient alert and oriented times four. Warm blanket was requesting, was given. Patient 's bed alarm on. Call light within reach. Patient denies any pain at this time. Will continue to monitor and assess.

## 2019-05-27 VITALS
RESPIRATION RATE: 16 BRPM | BODY MASS INDEX: 32.1 KG/M2 | HEIGHT: 57 IN | SYSTOLIC BLOOD PRESSURE: 158 MMHG | DIASTOLIC BLOOD PRESSURE: 97 MMHG | HEART RATE: 63 BPM | WEIGHT: 148.81 LBS | TEMPERATURE: 97.3 F | OXYGEN SATURATION: 97 %

## 2019-05-27 LAB
ANION GAP SERPL CALCULATED.3IONS-SCNC: 11 MMOL/L (ref 3–16)
ANISOCYTOSIS: ABNORMAL
ANISOCYTOSIS: ABNORMAL
BANDED NEUTROPHILS RELATIVE PERCENT: 2 % (ref 0–7)
BANDED NEUTROPHILS RELATIVE PERCENT: 8 % (ref 0–7)
BASOPHILS ABSOLUTE: 0 K/UL (ref 0–0.2)
BASOPHILS ABSOLUTE: 0 K/UL (ref 0–0.2)
BASOPHILS RELATIVE PERCENT: 0 %
BASOPHILS RELATIVE PERCENT: 0 %
BUN BLDV-MCNC: 9 MG/DL (ref 7–20)
CALCIUM SERPL-MCNC: 7.9 MG/DL (ref 8.3–10.6)
CHLORIDE BLD-SCNC: 106 MMOL/L (ref 99–110)
CO2: 23 MMOL/L (ref 21–32)
CREAT SERPL-MCNC: 0.6 MG/DL (ref 0.6–1.2)
CRENATED RBC'S: ABNORMAL
EOSINOPHILS ABSOLUTE: 0.3 K/UL (ref 0–0.6)
EOSINOPHILS ABSOLUTE: 0.8 K/UL (ref 0–0.6)
EOSINOPHILS RELATIVE PERCENT: 2 %
EOSINOPHILS RELATIVE PERCENT: 3 %
GFR AFRICAN AMERICAN: >60
GFR NON-AFRICAN AMERICAN: >60
GLUCOSE BLD-MCNC: 92 MG/DL (ref 70–99)
HCT VFR BLD CALC: 38.6 % (ref 36–48)
HCT VFR BLD CALC: 44.2 % (ref 36–48)
HEMATOLOGY PATH CONSULT: NO
HEMOGLOBIN: 12.8 G/DL (ref 12–16)
HEMOGLOBIN: 14.3 G/DL (ref 12–16)
LYMPHOCYTES ABSOLUTE: 1 K/UL (ref 1–5.1)
LYMPHOCYTES ABSOLUTE: 1.8 K/UL (ref 1–5.1)
LYMPHOCYTES RELATIVE PERCENT: 14 %
LYMPHOCYTES RELATIVE PERCENT: 4 %
MAGNESIUM: 1.7 MG/DL (ref 1.8–2.4)
MCH RBC QN AUTO: 30.7 PG (ref 26–34)
MCH RBC QN AUTO: 31.9 PG (ref 26–34)
MCHC RBC AUTO-ENTMCNC: 32.3 G/DL (ref 31–36)
MCHC RBC AUTO-ENTMCNC: 33.3 G/DL (ref 31–36)
MCV RBC AUTO: 95 FL (ref 80–100)
MCV RBC AUTO: 95.9 FL (ref 80–100)
METAMYELOCYTES RELATIVE PERCENT: 2 %
MONOCYTES ABSOLUTE: 0.7 K/UL (ref 0–1.3)
MONOCYTES ABSOLUTE: 2.8 K/UL (ref 0–1.3)
MONOCYTES RELATIVE PERCENT: 11 %
MONOCYTES RELATIVE PERCENT: 5 %
NEUTROPHILS ABSOLUTE: 10.3 K/UL (ref 1.7–7.7)
NEUTROPHILS ABSOLUTE: 21.2 K/UL (ref 1.7–7.7)
NEUTROPHILS RELATIVE PERCENT: 72 %
NEUTROPHILS RELATIVE PERCENT: 77 %
PDW BLD-RTO: 15.2 % (ref 12.4–15.4)
PDW BLD-RTO: 15.4 % (ref 12.4–15.4)
PLATELET # BLD: 291 K/UL (ref 135–450)
PLATELET # BLD: 296 K/UL (ref 135–450)
PLATELET SLIDE REVIEW: ADEQUATE
PLATELET SLIDE REVIEW: ADEQUATE
PMV BLD AUTO: 8.5 FL (ref 5–10.5)
PMV BLD AUTO: 8.7 FL (ref 5–10.5)
POIKILOCYTES: ABNORMAL
POTASSIUM SERPL-SCNC: 3.5 MMOL/L (ref 3.5–5.1)
RBC # BLD: 4.02 M/UL (ref 4–5.2)
RBC # BLD: 4.65 M/UL (ref 4–5.2)
SLIDE REVIEW: ABNORMAL
SLIDE REVIEW: ABNORMAL
SODIUM BLD-SCNC: 140 MMOL/L (ref 136–145)
VACUOLATED NEUTROPHILS: PRESENT
WBC # BLD: 13 K/UL (ref 4–11)
WBC # BLD: 25.8 K/UL (ref 4–11)

## 2019-05-27 PROCEDURE — 6370000000 HC RX 637 (ALT 250 FOR IP): Performed by: SURGERY

## 2019-05-27 PROCEDURE — 6360000002 HC RX W HCPCS: Performed by: INTERNAL MEDICINE

## 2019-05-27 PROCEDURE — 80048 BASIC METABOLIC PNL TOTAL CA: CPT

## 2019-05-27 PROCEDURE — 83735 ASSAY OF MAGNESIUM: CPT

## 2019-05-27 PROCEDURE — 6370000000 HC RX 637 (ALT 250 FOR IP): Performed by: NURSE PRACTITIONER

## 2019-05-27 PROCEDURE — 6370000000 HC RX 637 (ALT 250 FOR IP): Performed by: INTERNAL MEDICINE

## 2019-05-27 PROCEDURE — 6370000000 HC RX 637 (ALT 250 FOR IP)

## 2019-05-27 PROCEDURE — 94760 N-INVAS EAR/PLS OXIMETRY 1: CPT

## 2019-05-27 PROCEDURE — 2500000003 HC RX 250 WO HCPCS: Performed by: INTERNAL MEDICINE

## 2019-05-27 PROCEDURE — 36415 COLL VENOUS BLD VENIPUNCTURE: CPT

## 2019-05-27 PROCEDURE — 2580000003 HC RX 258: Performed by: INTERNAL MEDICINE

## 2019-05-27 PROCEDURE — 85025 COMPLETE CBC W/AUTO DIFF WBC: CPT

## 2019-05-27 RX ORDER — MORPHINE SULFATE 10 MG/.5ML
2.5 SOLUTION ORAL EVERY 4 HOURS PRN
Qty: 1 BOTTLE | Refills: 0 | Status: SHIPPED | OUTPATIENT
Start: 2019-05-27 | End: 2019-05-30

## 2019-05-27 RX ORDER — VANCOMYCIN HYDROCHLORIDE 250 MG/1
250 CAPSULE ORAL 4 TIMES DAILY
Qty: 16 CAPSULE | Refills: 0 | Status: SHIPPED | OUTPATIENT
Start: 2019-05-27 | End: 2019-05-31

## 2019-05-27 RX ORDER — HYDROCODONE BITARTRATE AND ACETAMINOPHEN 10; 325 MG/1; MG/1
1 TABLET ORAL EVERY 4 HOURS PRN
Qty: 10 TABLET | Refills: 0 | Status: SHIPPED | OUTPATIENT
Start: 2019-05-27 | End: 2019-05-30

## 2019-05-27 RX ORDER — LANOLIN ALCOHOL/MO/W.PET/CERES
400 CREAM (GRAM) TOPICAL ONCE
Status: COMPLETED | OUTPATIENT
Start: 2019-05-27 | End: 2019-05-27

## 2019-05-27 RX ADMIN — HEPARIN SODIUM 5000 UNITS: 5000 INJECTION INTRAVENOUS; SUBCUTANEOUS at 12:45

## 2019-05-27 RX ADMIN — Medication 1 CAPSULE: at 16:56

## 2019-05-27 RX ADMIN — Medication 10 ML: at 10:18

## 2019-05-27 RX ADMIN — VANCOMYCIN HYDROCHLORIDE 250 MG: 250 CAPSULE ORAL at 10:13

## 2019-05-27 RX ADMIN — METRONIDAZOLE 500 MG: 500 INJECTION, SOLUTION INTRAVENOUS at 16:56

## 2019-05-27 RX ADMIN — Medication 1 CAPSULE: at 10:13

## 2019-05-27 RX ADMIN — POTASSIUM BICARBONATE 20 MEQ: 782 TABLET, EFFERVESCENT ORAL at 10:14

## 2019-05-27 RX ADMIN — VANCOMYCIN HYDROCHLORIDE 250 MG: 250 CAPSULE ORAL at 16:56

## 2019-05-27 RX ADMIN — HEPARIN SODIUM 5000 UNITS: 5000 INJECTION INTRAVENOUS; SUBCUTANEOUS at 05:15

## 2019-05-27 RX ADMIN — METRONIDAZOLE 500 MG: 500 INJECTION, SOLUTION INTRAVENOUS at 10:14

## 2019-05-27 RX ADMIN — FIDAXOMICIN 200 MG: 200 TABLET, FILM COATED ORAL at 10:14

## 2019-05-27 RX ADMIN — MAGNESIUM OXIDE TAB 400 MG (240 MG ELEMENTAL MG) 400 MG: 400 (240 MG) TAB at 16:56

## 2019-05-27 RX ADMIN — HYDROCODONE BITARTRATE AND ACETAMINOPHEN 1 TABLET: 10; 325 TABLET ORAL at 00:17

## 2019-05-27 RX ADMIN — HYDROCODONE BITARTRATE AND ACETAMINOPHEN 1 TABLET: 10; 325 TABLET ORAL at 20:23

## 2019-05-27 RX ADMIN — VANCOMYCIN HYDROCHLORIDE 250 MG: 250 CAPSULE ORAL at 12:45

## 2019-05-27 RX ADMIN — HYDROCODONE BITARTRATE AND ACETAMINOPHEN 1 TABLET: 10; 325 TABLET ORAL at 05:21

## 2019-05-27 RX ADMIN — METRONIDAZOLE 500 MG: 500 INJECTION, SOLUTION INTRAVENOUS at 00:08

## 2019-05-27 RX ADMIN — HYDROCODONE BITARTRATE AND ACETAMINOPHEN 1 TABLET: 10; 325 TABLET ORAL at 12:49

## 2019-05-27 RX ADMIN — TIZANIDINE 2 MG: 4 TABLET ORAL at 00:17

## 2019-05-27 RX ADMIN — SOTALOL HYDROCHLORIDE 40 MG: 80 TABLET ORAL at 10:13

## 2019-05-27 RX ADMIN — Medication 1 CAPSULE: at 12:45

## 2019-05-27 RX ADMIN — POTASSIUM BICARBONATE 20 MEQ: 782 TABLET, EFFERVESCENT ORAL at 16:56

## 2019-05-27 ASSESSMENT — PAIN DESCRIPTION - ORIENTATION
ORIENTATION: LEFT;LOWER
ORIENTATION: LEFT;LOWER
ORIENTATION: LOWER;LEFT
ORIENTATION: LEFT;LOWER
ORIENTATION: RIGHT
ORIENTATION: LEFT;LOWER
ORIENTATION: MID
ORIENTATION: LEFT;LOWER
ORIENTATION: LOWER;LEFT
ORIENTATION: RIGHT
ORIENTATION: MID

## 2019-05-27 ASSESSMENT — PAIN DESCRIPTION - DESCRIPTORS
DESCRIPTORS: ACHING

## 2019-05-27 ASSESSMENT — PAIN - FUNCTIONAL ASSESSMENT
PAIN_FUNCTIONAL_ASSESSMENT: ACTIVITIES ARE NOT PREVENTED
PAIN_FUNCTIONAL_ASSESSMENT: PREVENTS OR INTERFERES SOME ACTIVE ACTIVITIES AND ADLS

## 2019-05-27 ASSESSMENT — PAIN DESCRIPTION - PROGRESSION
CLINICAL_PROGRESSION: GRADUALLY WORSENING
CLINICAL_PROGRESSION: GRADUALLY WORSENING
CLINICAL_PROGRESSION: NOT CHANGED
CLINICAL_PROGRESSION: NOT CHANGED
CLINICAL_PROGRESSION: GRADUALLY WORSENING
CLINICAL_PROGRESSION: GRADUALLY WORSENING
CLINICAL_PROGRESSION: NOT CHANGED
CLINICAL_PROGRESSION: GRADUALLY IMPROVING
CLINICAL_PROGRESSION: NOT CHANGED

## 2019-05-27 ASSESSMENT — PAIN DESCRIPTION - LOCATION
LOCATION: BACK;HIP
LOCATION: FOOT;LEG
LOCATION: BACK;HIP
LOCATION: ABDOMEN
LOCATION: ABDOMEN
LOCATION: BACK;HIP
LOCATION: FOOT
LOCATION: BACK;HIP
LOCATION: BACK;HIP

## 2019-05-27 ASSESSMENT — PAIN SCALES - GENERAL
PAINLEVEL_OUTOF10: 7
PAINLEVEL_OUTOF10: 6
PAINLEVEL_OUTOF10: 6
PAINLEVEL_OUTOF10: 4
PAINLEVEL_OUTOF10: 8
PAINLEVEL_OUTOF10: 7
PAINLEVEL_OUTOF10: 8
PAINLEVEL_OUTOF10: 9
PAINLEVEL_OUTOF10: 0
PAINLEVEL_OUTOF10: 7

## 2019-05-27 ASSESSMENT — PAIN DESCRIPTION - PAIN TYPE
TYPE: ACUTE PAIN

## 2019-05-27 ASSESSMENT — PAIN DESCRIPTION - FREQUENCY
FREQUENCY: CONTINUOUS

## 2019-05-27 ASSESSMENT — PAIN DESCRIPTION - ONSET
ONSET: ON-GOING
ONSET: GRADUAL
ONSET: ON-GOING
ONSET: GRADUAL
ONSET: ON-GOING

## 2019-05-27 NOTE — PROGRESS NOTES
Pt resting in bed with eyes closed, no s/s distress, resp e/e, call light in reach.   Electronically signed by Rea Perez RN on 5/27/2019 at 11:35 AM

## 2019-05-27 NOTE — PLAN OF CARE
Problem: Falls - Risk of:  Goal: Will remain free from falls  Description  Will remain free from falls  5/27/2019 0227 by Bruno Lugo RN  Outcome: Ongoing  Note:   Pt armband and slip resistance socks are on. Bed alarm activated. Side rails up. Bed in low position. Call light in reach. Problem: Falls - Risk of:  Goal: Absence of physical injury  Description  Absence of physical injury  5/27/2019 0227 by Bruno Lugo RN  Outcome: Ongoing     Problem: Risk for Impaired Skin Integrity  Goal: Tissue integrity - skin and mucous membranes  Description  Structural intactness and normal physiological function of skin and  mucous membranes. 5/27/2019 0227 by rBuno Lugo RN  Outcome: Ongoing  Note:   Skin assessment complete. No new signs of skin breakdown noted. Repositioning patient with pillows at two hour intervals. Heels elevated off bed. Problem: Nutrition  Goal: Optimal nutrition therapy  5/27/2019 0227 by Bruno Lugo RN  Outcome: Ongoing  Note:   Pt tolerating food and drink. No nausea or vomiting noted. Problem: Bowel/Gastric:  Goal: Occurrences of diarrhea will decrease  Description  Occurrences of diarrhea will decrease  5/27/2019 0227 by Bruno Lugo RN  Outcome: Ongoing  Note:   No episode of diarrhea noted up to this point of shift. Will continue to monitor. Problem: Fluid Volume:  Goal: Will show no signs and symptoms of electrolyte imbalance  Description  Will show no signs and symptoms of electrolyte imbalance  5/27/2019 0227 by Bruno Lugo RN  Outcome: Ongoing  Note:   Generalized edema noted and charted. Extremities elevated.      Problem: Physical Regulation:  Goal: Prevent transmision of infection  Description  Prevent transmision of infection  5/27/2019 0227 by Bruno Lugo RN  Outcome: Ongoing     Problem: Physical Regulation:  Goal: Signs and symptoms of infection will decrease  Description  Signs and symptoms of infection will decrease  5/27/2019 0227 by Claudine Salomon RN  Outcome: Ongoing  Note:   Pt has c diff no diarrhea noted up to this point of care. Pt getting flagyl. Will continue to monitor. Problem: Pain:  Goal: Pain level will decrease  Description  Pain level will decrease  5/27/2019 0227 by Claudine Salomon RN  Outcome: Ongoing     Problem: Pain:  Goal: Control of acute pain  Description  Control of acute pain  5/27/2019 0227 by Claudine Salomon RN  Outcome: Ongoing  Note:   Reviewed pain scale with patient. Viewed nonpharmacologic ways to reduce pain. Pt is to communicate ineffective pain management. Rn will monitor for needs of pain meds.        Problem: Pain:  Goal: Control of chronic pain  Description  Control of chronic pain  5/27/2019 0227 by Claudine Salomon RN  Outcome: Ongoing

## 2019-05-27 NOTE — DISCHARGE SUMMARY
Hospital Medicine Discharge Summary      Patient ID: Zackery Levy      Patient's PCP: Lindsey Myrick MD    Admit Date: 5/16/2019     Discharge Date:   05/27/19     Admitting Physician: Liss Velazco MD     Discharge Provider: CHERELLE Dorsey - CNP     Discharge Diagnoses: Active Hospital Problems    Diagnosis Date Noted    Lactic acidosis [E87.2]     Leukemoid reaction [D72.823]     Infection requiring contact isolation precautions [B99.9] 05/19/2019    Acute renal failure (ARF) (HCC) [N17.9] 05/19/2019    Pancolitis ([facility] [address] [K51.00] 05/19/2019    Acute kidney injury ([address] [N17.9]     Dehydration [E86.0]     Coronary artery disease due to lipid rich plaque [I25.10, I25.83]     Overweight (BMI 25.0-29. 9) [E66.3]     Weight loss counseling, encounter for [Z71.3]     C. difficile colitis [A04.72] 05/16/2019    Hypertension [I10] 01/13/2014    Atrial fibrillation ([facility] [address] [I48.91] 01/13/2014    DDD (degenerative disc disease), lumbar [M51.36] 06/07/2011       Disposition:  [] Home  [] Home with home health [] Rehab [] Psych [] SNF  [] LTAC  [] Long term nursing home or group home [] Transfer to ICU  [] Transfer to PCU [x] Other: Sana Hatch MD  [address] [place] [address] Dr Jacki Blum  [place] [address]  [phone]      for hospital follow up, As needed    [address] [place] [address] [phone]    As needed       Discharge Condition: stable/terminal      Code Status:  DNR-CC     Activity: activity as tolerated    Diet: Dietary Nutrition Supplements: Clear Liquid Oral Supplement  DIET GENERAL;     Discharge Medications:     Current Discharge Medication List           Details   HYDROcodone-acetaminophen (NORCO)  MG per tablet Take 1 tablet by mouth every 4 hours as needed for Pain for up to 3 days.   Qty: 10 tablet, Refills: 0    Comments: Reduce doses taken as pain becomes manageable  Associated Diagnoses: C. difficile colitis      Morphine Sulfate, Concentrate, 10 MG/0.5ML SOLN Take 0.125 mLs by mouth every 4 hours as needed (For breakthrough pain and/or air hunger) for up to 3 days. Qty: 1 Bottle, Refills: 0    Comments: Reduce doses taken as pain becomes manageable  Associated Diagnoses: C. difficile colitis      Fidaxomicin (DIFICID) 200 MG TABS tablet Take 200 mg by mouth 2 times daily for 6 days  Qty: 12 tablet, Refills: 0      vancomycin (VANCOCIN) 250 MG capsule Take 1 capsule by mouth 4 times daily for 4 days  Qty: 16 capsule, Refills: 0              Details   clopidogrel (PLAVIX) 75 MG tablet TAKE 1 TABLET BY MOUTH EVERY DAY  Qty: 90 tablet, Refills: 2    Associated Diagnoses: Persistent atrial fibrillation (HCC)      aspirin 81 MG chewable tablet Take 1 tablet by mouth daily  Qty: 30 tablet, Refills: 3      venlafaxine (EFFEXOR XR) 75 MG extended release capsule Take 75 mg by mouth nightly       traZODone (DESYREL) 50 MG tablet Take 1 tablet by mouth nightly  Qty: 30 tablet, Refills: 0      calcium carbonate 600 MG TABS tablet Take 1 tablet by mouth every evening       pravastatin (PRAVACHOL) 40 MG tablet Take 40 mg by mouth every evening. omeprazole (PRILOSEC) 40 MG capsule Take 40 mg by mouth daily as needed (acid reflux) Take 40 mg by mouth daily. lisinopril (PRINIVIL;ZESTRIL) 40 MG tablet Take 40 mg by mouth every evening       polyethylene glycol (GLYCOLAX) packet Take 17 g by mouth daily as needed for Constipation      tiZANidine (ZANAFLEX) 2 MG tablet Take 2 mg by mouth every 8 hours as needed (muscle spasms)       sotalol (BETAPACE) 80 MG tablet Take 40 mg by mouth daily              The patient was seen and examined on day of discharge and this discharge summary is in conjunction with any daily progress note from day of discharge. Hospital Course:    This is an 42-year-old female with a history of chronic systolic heart failure, atrial fibrillation, CAD who presented to the ED with complaints of fatigue and diarrhea. Patient had taken antibiotic for UTI prior to diarrhea starting. CT scan was performed showing pancolitis. Infectious disease was consulted. She was started on Dificid, by mouth vancomycin and IV Flagyl due to ongoing nausea and vomiting. Patient declined fecal transplant and did not tolerate rectal vancomycin as she was very slow to respond to initial therapies. She's completed 11 days of IV Flagyl, 8 days of Dificid and 10 days of vancomycin. She is down to one bowel movement per day. I spoke with her son and hospice today and she will be discharged to hospice in terminal but stable condition. She will complete and 14 day course of dificid and vanc. They have verbalized understanding and are in agreement with plan of care. Exam:     BP (!) 150/85   Pulse 69   Temp 97.3 °F (36.3 °C) (Oral)   Resp 18   Ht 4' 9\" (1.448 m)   Wt 148 lb 13 oz (67.5 kg)   SpO2 96%   BMI 32.20 kg/m²     General: Resting in bed in no apparent distress. HEENT: Normocephalic. Atraumatic. Pupils equal and reactive. EOM intact. Oral mucosa pink/moist/intact. Neck: Supple. Symmetrical. Trachea midline. Lungs: Clear to auscultation bilaterally. Respirations even and unlabored. Chest: Exam unremarkable. Cardiac: S1/S2 noted. Regular Rhythm and rate. Abdomen/GI: Soft. Non-distended. BS+. Generalized tenderness  Extremities: PP+. Atraumatic. No redness/cyanosis/edema noted. Brisk cap refill. Skin: Dry and intact. No lesions noted. Neuro: Grossly intact. No focal deficits noted. Consults:     IP CONSULT TO DIETITIAN  IP CONSULT TO INFECTIOUS DISEASES  IP CONSULT TO GENERAL SURGERY  IP CONSULT TO PALLIATIVE CARE  IP CONSULT TO HOSPICE    Significant Diagnostic Studies:   CT      Labs:  For convenience and continuity at follow-up the following most recent labs are provided:    CBC:    Lab Results   Component Value Date    WBC 13.0 05/27/2019    HGB 12.8 05/27/2019    HCT 38.6 05/27/2019  05/27/2019       Renal:    Lab Results   Component Value Date     05/27/2019    K 3.5 05/27/2019    K 3.6 05/17/2019     05/27/2019    CO2 23 05/27/2019    BUN 9 05/27/2019    CREATININE 0.6 05/27/2019    CALCIUM 7.9 05/27/2019    PHOS 3.8 02/05/2014       Future Appointments   Date Time Provider [facility]   12/4/2019  1:00 PM Ramsey Salmon MD [facility]       Time Spent on discharge is more than 30 minutes in the examination, evaluation, counseling and review of medications and discharge plan. RX monitoring reviewed     Signed:    Deniz Cm, APRN - CNP   5/27/2019      Thank you Flor Foster MD for the opportunity to be involved in this patient's care. If you have any questions or concerns please feel free to contact me at [phone].

## 2019-05-27 NOTE — PROGRESS NOTES
[facility]- Patient will be transferred to the [facility] this evening at 8 PM via PTS. Thank you for the opportunity to care for Mrs. Shakira Barajas. Please call [phone] for any needs.     Lacho Sheriff

## 2019-05-27 NOTE — PROGRESS NOTES
Iv dc'd at this time, abbocath intact, pt being transferred to [address] [place] [address] at [address]  Electronically signed by Yisel Gordon RN on 5/27/2019 at 6:07 PM

## 2019-05-27 NOTE — PLAN OF CARE
Bowel/Gastric:  Goal: Occurrences of diarrhea will decrease  Description  Occurrences of diarrhea will decrease  5/27/2019 0733 by Laura Blackwood RN  Outcome: Ongoing  Note:   Occurrences of diarrhea will decrease. Electronically signed by Laura Blackwood RN on 5/27/2019 at 7:38 AM    5/27/2019 0227 by Ernesto Olivia RN  Outcome: Ongoing  Note:   No episode of diarrhea noted up to this point of shift. Will continue to monitor. 5/26/2019 2251 by Trinidad Emerson RN  Outcome: Ongoing     Problem: Fluid Volume:  Goal: Will show no signs and symptoms of electrolyte imbalance  Description  Will show no signs and symptoms of electrolyte imbalance  5/27/2019 0733 by Laura Blackwood RN  Outcome: Ongoing  Note:   Will show no signs/symptoms of electrolyte imbalance. Electronically signed by Laura Blackwood RN on 5/27/2019 at 7:38 AM    5/27/2019 0227 by Ernesto Olivia RN  Outcome: Ongoing  Note:   Generalized edema noted and charted. Extremities elevated. 5/26/2019 2251 by Trinidad Emerson RN  Outcome: Ongoing     Problem: Physical Regulation:  Goal: Prevent transmision of infection  Description  Prevent transmision of infection  5/27/2019 0733 by Laura Blackwood RN  Outcome: Ongoing  Note:   Prevent transmission of infection. Electronically signed by Laura Blackwood RN on 5/27/2019 at 7:38 AM    5/27/2019 0227 by Ernesto Olivia RN  Outcome: Ongoing  5/26/2019 2251 by Trinidad Emerson RN  Outcome: Ongoing  Goal: Signs and symptoms of infection will decrease  Description  Signs and symptoms of infection will decrease  5/27/2019 0733 by Laura Blackwood RN  Outcome: Ongoing  Note:   Signs/symptoms of infection will decrease. Electronically signed by Laura Blackwood RN on 5/27/2019 at 7:39 AM    5/27/2019 0227 by Ernesto Olivia RN  Outcome: Ongoing  Note:   Pt has c diff no diarrhea noted up to this point of care. Pt getting flagyl. Will continue to monitor.   5/26/2019 2251 by Trinidad Emerson RN  Outcome: Ongoing     Problem: Pain:  Goal: Pain level will decrease  Description  Pain level will decrease  5/27/2019 0733 by Yang Sawant RN  Outcome: Ongoing  Note:   Pain level will decrease. Electronically signed by Yang Sawant RN on 5/27/2019 at 7:39 AM    5/27/2019 0227 by Connie Carrasco RN  Outcome: Ongoing  5/26/2019 2251 by Catarina Coburn RN  Outcome: Ongoing  Goal: Control of acute pain  Description  Control of acute pain  5/27/2019 0733 by Yang Sawant RN  Outcome: Ongoing  Note:   Control of acute pain. Electronically signed by Yang Sawant RN on 5/27/2019 at 7:39 AM    5/27/2019 0227 by Connie Carrasco RN  Outcome: Ongoing  Note:   Reviewed pain scale with patient. Viewed nonpharmacologic ways to reduce pain. Pt is to communicate ineffective pain management. Rn will monitor for needs of pain meds. 5/26/2019 2251 by Catarina Coburn RN  Outcome: Ongoing  Goal: Control of chronic pain  Description  Control of chronic pain  5/27/2019 0733 by Yang Sawant RN  Outcome: Ongoing  Note:   Control of chronic pain. Electronically signed by Yang Sawant RN on 5/27/2019 at 7:39 AM    5/27/2019 0227 by Connie Carrasco RN  Outcome: Ongoing  5/26/2019 2251 by Catarina Coburn RN  Outcome: Ongoing     Problem: Falls - Risk of:  Goal: Will remain free from falls  Description  Will remain free from falls  5/27/2019 0733 by Yang Sawant RN  Outcome: Ongoing  Note:   Will remain free from falls. Electronically signed by Yang Sawant RN on 5/27/2019 at 7:33 AM    5/27/2019 0227 by Connie Carrasco RN  Outcome: Ongoing  Note:   Pt armband and slip resistance socks are on. Bed alarm activated. Side rails up. Bed in low position. Call light in reach.   5/26/2019 2251 by Catarina Coburn RN  Outcome: Ongoing  Goal: Absence of physical injury  Description  Absence of physical injury  5/27/2019 0733 by Yang Sawant RN  Outcome: Ongoing  Note:   Absence of physical injury.   Electronically signed by Purnima Delgado RN on 5/27/2019 at 7:34 AM    5/27/2019 0227 by Rolan Corea RN  Outcome: Ongoing  5/26/2019 2251 by Gasper Candelario RN  Outcome: Ongoing     Problem: Risk for Impaired Skin Integrity  Goal: Tissue integrity - skin and mucous membranes  Description  Structural intactness and normal physiological function of skin and  mucous membranes. 5/27/2019 0733 by Purnima Delgado RN  Outcome: Ongoing  Note:   Structural intactness of skin and mucous membranes. Electronically signed by Purnima Delgado RN on 5/27/2019 at 7:34 AM    5/27/2019 0227 by Rolan Corea RN  Outcome: Ongoing  Note:   Skin assessment complete. No new signs of skin breakdown noted. Repositioning patient with pillows at two hour intervals. Heels elevated off bed.   5/26/2019 2251 by Gasper Candelario RN  Outcome: Ongoing     Problem: Nutrition  Goal: Optimal nutrition therapy  5/27/2019 0733 by Purnima Delgado RN  Outcome: Ongoing  Note:   Optimal nutrition therapy. Electronically signed by Purnima Delgado RN on 5/27/2019 at 7:34 AM    5/27/2019 0227 by Rolan Corea RN  Outcome: Ongoing  Note:   Pt tolerating food and drink. No nausea or vomiting noted. 5/26/2019 2251 by Gasper Candelario RN  Outcome: Ongoing     Problem: Bowel/Gastric:  Goal: Occurrences of diarrhea will decrease  Description  Occurrences of diarrhea will decrease  5/27/2019 0733 by Purnima Delgado RN  Outcome: Ongoing  Note:   Occurrences of diarrhea will decrease. Electronically signed by Purnima Delgado RN on 5/27/2019 at 7:38 AM    5/27/2019 0227 by Rolan Corea RN  Outcome: Ongoing  Note:   No episode of diarrhea noted up to this point of shift. Will continue to monitor.   5/26/2019 2251 by Gasper Candelario RN  Outcome: Ongoing     Problem: Fluid Volume:  Goal: Will show no signs and symptoms of electrolyte imbalance  Description  Will show no signs and symptoms of electrolyte imbalance  5/27/2019 0733 by Purnima Delgado RN  Outcome: Ongoing  Note:   Will show no signs/symptoms of electrolyte imbalance. Electronically signed by Ashley Quintero RN on 5/27/2019 at 7:38 AM    5/27/2019 0227 by Rose Johnston RN  Outcome: Ongoing  Note:   Generalized edema noted and charted. Extremities elevated. 5/26/2019 2251 by Armando Sánchez RN  Outcome: Ongoing     Problem: Physical Regulation:  Goal: Prevent transmision of infection  Description  Prevent transmision of infection  5/27/2019 0733 by Ashley Quintero RN  Outcome: Ongoing  Note:   Prevent transmission of infection. Electronically signed by Ashley Quintero RN on 5/27/2019 at 7:38 AM    5/27/2019 0227 by Rose Johnston RN  Outcome: Ongoing  5/26/2019 2251 by Armando Sánchez RN  Outcome: Ongoing  Goal: Signs and symptoms of infection will decrease  Description  Signs and symptoms of infection will decrease  5/27/2019 0733 by Ashley Quintero RN  Outcome: Ongoing  Note:   Signs/symptoms of infection will decrease. Electronically signed by Ashley Quintero RN on 5/27/2019 at 7:39 AM    5/27/2019 0227 by Rose Johnston RN  Outcome: Ongoing  Note:   Pt has c diff no diarrhea noted up to this point of care. Pt getting flagyl. Will continue to monitor. 5/26/2019 2251 by Armando Sánchez RN  Outcome: Ongoing     Problem: Pain:  Goal: Pain level will decrease  Description  Pain level will decrease  5/27/2019 0733 by Ashley Quintero RN  Outcome: Ongoing  Note:   Pain level will decrease. Electronically signed by Ashley Quintero RN on 5/27/2019 at 7:39 AM    5/27/2019 0227 by Rose Johnston RN  Outcome: Ongoing  5/26/2019 2251 by Armando Sánchez RN  Outcome: Ongoing  Goal: Control of acute pain  Description  Control of acute pain  5/27/2019 0733 by Ashley Quintero RN  Outcome: Ongoing  Note:   Control of acute pain. Electronically signed by Ashley Quintero RN on 5/27/2019 at 7:39 AM    5/27/2019 0227 by Rose Johnston RN  Outcome: Ongoing  Note:   Reviewed pain scale with patient.

## 2019-05-28 NOTE — PROGRESS NOTES
Pt picked up by [facility] with all belongings. Pt IV out. Vital signs stable. Report given to ems.   Electronically signed by Zonia Gonzalez RN on 5/27/2019 at 9:25 PM

## 2019-06-05 DIAGNOSIS — I48.19 PERSISTENT ATRIAL FIBRILLATION (HCC): ICD-10-CM

## 2019-06-06 RX ORDER — CLOPIDOGREL BISULFATE 75 MG/1
75 TABLET ORAL DAILY
Qty: 90 TABLET | Refills: 2 | Status: SHIPPED | OUTPATIENT
Start: 2019-06-06